# Patient Record
Sex: FEMALE | Race: WHITE | Employment: UNEMPLOYED | URBAN - NONMETROPOLITAN AREA
[De-identification: names, ages, dates, MRNs, and addresses within clinical notes are randomized per-mention and may not be internally consistent; named-entity substitution may affect disease eponyms.]

---

## 2020-06-21 ENCOUNTER — HOSPITAL ENCOUNTER (EMERGENCY)
Age: 65
Discharge: ANOTHER HOSPITAL | End: 2020-06-22
Attending: EMERGENCY MEDICINE
Payer: MEDICAID

## 2020-06-21 LAB
ABSOLUTE EOS #: 0.14 K/UL (ref 0–0.44)
ABSOLUTE IMMATURE GRANULOCYTE: 0.04 K/UL (ref 0–0.3)
ABSOLUTE LYMPH #: 1.7 K/UL (ref 1.1–3.7)
ABSOLUTE MONO #: 0.38 K/UL (ref 0.1–1.2)
BASOPHILS # BLD: 0 % (ref 0–2)
BASOPHILS ABSOLUTE: 0.03 K/UL (ref 0–0.2)
DIFFERENTIAL TYPE: ABNORMAL
EOSINOPHILS RELATIVE PERCENT: 2 % (ref 1–4)
HCT VFR BLD CALC: 41.6 % (ref 36.3–47.1)
HEMOGLOBIN: 13.5 G/DL (ref 11.9–15.1)
IMMATURE GRANULOCYTES: 1 %
LYMPHOCYTES # BLD: 20 % (ref 24–43)
MCH RBC QN AUTO: 29.4 PG (ref 25.2–33.5)
MCHC RBC AUTO-ENTMCNC: 32.5 G/DL (ref 28.4–34.8)
MCV RBC AUTO: 90.6 FL (ref 82.6–102.9)
MONOCYTES # BLD: 5 % (ref 3–12)
NRBC AUTOMATED: 0 PER 100 WBC
PDW BLD-RTO: 13.2 % (ref 11.8–14.4)
PLATELET # BLD: 201 K/UL (ref 138–453)
PLATELET ESTIMATE: ABNORMAL
PMV BLD AUTO: 11 FL (ref 8.1–13.5)
RBC # BLD: 4.59 M/UL (ref 3.95–5.11)
RBC # BLD: ABNORMAL 10*6/UL
SEG NEUTROPHILS: 72 % (ref 36–65)
SEGMENTED NEUTROPHILS ABSOLUTE COUNT: 6.14 K/UL (ref 1.5–8.1)
WBC # BLD: 8.4 K/UL (ref 3.5–11.3)
WBC # BLD: ABNORMAL 10*3/UL

## 2020-06-21 PROCEDURE — 83690 ASSAY OF LIPASE: CPT

## 2020-06-21 PROCEDURE — 36415 COLL VENOUS BLD VENIPUNCTURE: CPT

## 2020-06-21 PROCEDURE — 80076 HEPATIC FUNCTION PANEL: CPT

## 2020-06-21 PROCEDURE — 85025 COMPLETE CBC W/AUTO DIFF WBC: CPT

## 2020-06-21 PROCEDURE — 80306 DRUG TEST PRSMV INSTRMNT: CPT

## 2020-06-21 PROCEDURE — 83605 ASSAY OF LACTIC ACID: CPT

## 2020-06-21 PROCEDURE — 87086 URINE CULTURE/COLONY COUNT: CPT

## 2020-06-21 PROCEDURE — 84484 ASSAY OF TROPONIN QUANT: CPT

## 2020-06-21 PROCEDURE — G0480 DRUG TEST DEF 1-7 CLASSES: HCPCS

## 2020-06-21 PROCEDURE — 85610 PROTHROMBIN TIME: CPT

## 2020-06-21 PROCEDURE — 99285 EMERGENCY DEPT VISIT HI MDM: CPT

## 2020-06-21 PROCEDURE — 85730 THROMBOPLASTIN TIME PARTIAL: CPT

## 2020-06-21 PROCEDURE — 80048 BASIC METABOLIC PNL TOTAL CA: CPT

## 2020-06-21 PROCEDURE — 83880 ASSAY OF NATRIURETIC PEPTIDE: CPT

## 2020-06-21 PROCEDURE — 93005 ELECTROCARDIOGRAM TRACING: CPT | Performed by: EMERGENCY MEDICINE

## 2020-06-21 RX ORDER — 0.9 % SODIUM CHLORIDE 0.9 %
1000 INTRAVENOUS SOLUTION INTRAVENOUS ONCE
Status: COMPLETED | OUTPATIENT
Start: 2020-06-22 | End: 2020-06-22

## 2020-06-21 RX ORDER — ONDANSETRON 2 MG/ML
4 INJECTION INTRAMUSCULAR; INTRAVENOUS ONCE
Status: COMPLETED | OUTPATIENT
Start: 2020-06-22 | End: 2020-06-22

## 2020-06-21 RX ORDER — METHYLPREDNISOLONE SODIUM SUCCINATE 125 MG/2ML
125 INJECTION, POWDER, LYOPHILIZED, FOR SOLUTION INTRAMUSCULAR; INTRAVENOUS ONCE
Status: COMPLETED | OUTPATIENT
Start: 2020-06-22 | End: 2020-06-22

## 2020-06-21 RX ORDER — DIPHENHYDRAMINE HYDROCHLORIDE 50 MG/ML
50 INJECTION INTRAMUSCULAR; INTRAVENOUS ONCE
Status: COMPLETED | OUTPATIENT
Start: 2020-06-22 | End: 2020-06-22

## 2020-06-22 ENCOUNTER — APPOINTMENT (OUTPATIENT)
Dept: MRI IMAGING | Age: 65
DRG: 053 | End: 2020-06-22
Attending: FAMILY MEDICINE
Payer: MEDICAID

## 2020-06-22 ENCOUNTER — APPOINTMENT (OUTPATIENT)
Dept: GENERAL RADIOLOGY | Age: 65
DRG: 053 | End: 2020-06-22
Attending: FAMILY MEDICINE
Payer: MEDICAID

## 2020-06-22 ENCOUNTER — APPOINTMENT (OUTPATIENT)
Dept: CT IMAGING | Age: 65
End: 2020-06-22
Payer: MEDICAID

## 2020-06-22 ENCOUNTER — HOSPITAL ENCOUNTER (INPATIENT)
Age: 65
LOS: 1 days | Discharge: HOME | DRG: 053 | End: 2020-06-23
Attending: FAMILY MEDICINE | Admitting: INTERNAL MEDICINE
Payer: MEDICAID

## 2020-06-22 VITALS
OXYGEN SATURATION: 93 % | TEMPERATURE: 96.6 F | DIASTOLIC BLOOD PRESSURE: 78 MMHG | HEART RATE: 89 BPM | SYSTOLIC BLOOD PRESSURE: 158 MMHG | RESPIRATION RATE: 16 BRPM

## 2020-06-22 PROBLEM — R56.9 SEIZURE (HCC): Status: ACTIVE | Noted: 2020-06-22

## 2020-06-22 LAB
-: ABNORMAL
ALBUMIN SERPL-MCNC: 4.4 G/DL (ref 3.5–5.2)
ALBUMIN/GLOBULIN RATIO: 1.5 (ref 1–2.5)
ALP BLD-CCNC: 91 U/L (ref 35–104)
ALT SERPL-CCNC: 17 U/L (ref 5–33)
AMORPHOUS: ABNORMAL
AMPHETAMINE SCREEN URINE: NEGATIVE
ANION GAP SERPL CALCULATED.3IONS-SCNC: 14 MMOL/L (ref 9–17)
ANION GAP SERPL CALCULATED.3IONS-SCNC: 15 MEQ/L (ref 8–16)
AST SERPL-CCNC: 22 U/L
BACTERIA: ABNORMAL
BARBITURATE SCREEN URINE: NEGATIVE
BENZODIAZEPINE SCREEN, URINE: NEGATIVE
BILIRUB SERPL-MCNC: 0.2 MG/DL (ref 0.3–1.2)
BILIRUBIN DIRECT: <0.08 MG/DL
BILIRUBIN URINE: NEGATIVE
BILIRUBIN, INDIRECT: ABNORMAL MG/DL (ref 0–1)
BNP INTERPRETATION: NORMAL
BUN BLDV-MCNC: 17 MG/DL (ref 7–22)
BUN BLDV-MCNC: 20 MG/DL (ref 8–23)
BUN/CREAT BLD: 19 (ref 9–20)
BUPRENORPHINE URINE: NEGATIVE
CALCIUM SERPL-MCNC: 8.7 MG/DL (ref 8.6–10.4)
CALCIUM SERPL-MCNC: 9.4 MG/DL (ref 8.5–10.5)
CANNABINOID SCREEN URINE: NEGATIVE
CASTS UA: ABNORMAL /LPF
CHLORIDE BLD-SCNC: 97 MEQ/L (ref 98–111)
CHLORIDE BLD-SCNC: 98 MMOL/L (ref 98–107)
CO2: 22 MEQ/L (ref 23–33)
CO2: 25 MMOL/L (ref 20–31)
COCAINE METABOLITE, URINE: NEGATIVE
COLOR: YELLOW
COMMENT UA: ABNORMAL
CREAT SERPL-MCNC: 0.9 MG/DL (ref 0.4–1.2)
CREAT SERPL-MCNC: 1.04 MG/DL (ref 0.5–0.9)
CRYSTALS, UA: ABNORMAL /HPF
EKG ATRIAL RATE: 73 BPM
EKG P AXIS: 35 DEGREES
EKG P-R INTERVAL: 204 MS
EKG Q-T INTERVAL: 420 MS
EKG QRS DURATION: 88 MS
EKG QTC CALCULATION (BAZETT): 462 MS
EKG R AXIS: 16 DEGREES
EKG T AXIS: 12 DEGREES
EKG VENTRICULAR RATE: 73 BPM
EPITHELIAL CELLS UA: ABNORMAL /HPF (ref 0–25)
ERYTHROCYTE [DISTWIDTH] IN BLOOD BY AUTOMATED COUNT: 15.6 % (ref 11.5–14.5)
ERYTHROCYTE [DISTWIDTH] IN BLOOD BY AUTOMATED COUNT: 45.1 FL (ref 35–45)
ETHANOL PERCENT: <0.01 %
ETHANOL: <10 MG/DL
GFR AFRICAN AMERICAN: >60 ML/MIN
GFR NON-AFRICAN AMERICAN: 54 ML/MIN
GFR SERPL CREATININE-BSD FRML MDRD: 63 ML/MIN/1.73M2
GFR SERPL CREATININE-BSD FRML MDRD: ABNORMAL ML/MIN/{1.73_M2}
GFR SERPL CREATININE-BSD FRML MDRD: ABNORMAL ML/MIN/{1.73_M2}
GLOBULIN: ABNORMAL G/DL (ref 1.5–3.8)
GLUCOSE BLD-MCNC: 163 MG/DL (ref 70–108)
GLUCOSE BLD-MCNC: 178 MG/DL (ref 70–99)
GLUCOSE URINE: NEGATIVE
HCT VFR BLD CALC: 37.9 % (ref 37–47)
HEMOGLOBIN: 13.8 GM/DL (ref 12–16)
INR BLD: 1
KETONES, URINE: ABNORMAL
LACTIC ACID: 2.6 MMOL/L (ref 0.5–2.2)
LACTIC ACID: 3.1 MMOL/L (ref 0.5–2.2)
LACTIC ACID: 3.3 MMOL/L (ref 0.5–2.2)
LEUKOCYTE ESTERASE, URINE: NEGATIVE
LIPASE: 36 U/L (ref 13–60)
MCH RBC QN AUTO: 35.3 PG (ref 26–33)
MCHC RBC AUTO-ENTMCNC: 36.4 GM/DL (ref 32.2–35.5)
MCV RBC AUTO: 96.9 FL (ref 81–99)
MDMA URINE: ABNORMAL
METHADONE SCREEN, URINE: NEGATIVE
METHAMPHETAMINE, URINE: NEGATIVE
MUCUS: ABNORMAL
NITRITE, URINE: POSITIVE
OPIATES, URINE: POSITIVE
OTHER OBSERVATIONS UA: ABNORMAL
OXYCODONE SCREEN URINE: NEGATIVE
PARTIAL THROMBOPLASTIN TIME: 28 SEC (ref 24–36)
PH UA: 6 (ref 5–9)
PHENCYCLIDINE, URINE: NEGATIVE
PLATELET # BLD: 200 THOU/MM3 (ref 130–400)
PMV BLD AUTO: 11.1 FL (ref 9.4–12.4)
POTASSIUM SERPL-SCNC: 4 MMOL/L (ref 3.7–5.3)
POTASSIUM SERPL-SCNC: 4.2 MEQ/L (ref 3.5–5.2)
PRO-BNP: 285 PG/ML
PROPOXYPHENE, URINE: NEGATIVE
PROTEIN UA: NEGATIVE
PROTHROMBIN TIME: 12.9 SEC (ref 11.8–14.6)
RBC # BLD: 3.91 MILL/MM3 (ref 4.2–5.4)
RBC UA: ABNORMAL /HPF (ref 0–2)
RENAL EPITHELIAL, UA: ABNORMAL /HPF
SODIUM BLD-SCNC: 134 MEQ/L (ref 135–145)
SODIUM BLD-SCNC: 137 MMOL/L (ref 135–144)
SPECIFIC GRAVITY UA: 1.02 (ref 1.01–1.02)
TEST INFORMATION: ABNORMAL
TOTAL CK: 109 U/L (ref 30–135)
TOTAL PROTEIN: 7.4 G/DL (ref 6.4–8.3)
TRICHOMONAS: ABNORMAL
TRICYCLIC ANTIDEPRESSANTS, UR: NEGATIVE
TROPONIN INTERP: NORMAL
TROPONIN T: NORMAL NG/ML
TROPONIN, HIGH SENSITIVITY: 9 NG/L (ref 0–14)
TURBIDITY: CLEAR
URINE HGB: ABNORMAL
UROBILINOGEN, URINE: NORMAL
WBC # BLD: 7.8 THOU/MM3 (ref 4.8–10.8)
WBC UA: ABNORMAL /HPF (ref 0–5)
YEAST: ABNORMAL

## 2020-06-22 PROCEDURE — 87088 URINE BACTERIA CULTURE: CPT

## 2020-06-22 PROCEDURE — 6360000004 HC RX CONTRAST MEDICATION: Performed by: EMERGENCY MEDICINE

## 2020-06-22 PROCEDURE — 2580000003 HC RX 258: Performed by: NURSE PRACTITIONER

## 2020-06-22 PROCEDURE — 72125 CT NECK SPINE W/O DYE: CPT

## 2020-06-22 PROCEDURE — 72072 X-RAY EXAM THORAC SPINE 3VWS: CPT

## 2020-06-22 PROCEDURE — 82550 ASSAY OF CK (CPK): CPT

## 2020-06-22 PROCEDURE — 6370000000 HC RX 637 (ALT 250 FOR IP): Performed by: NURSE PRACTITIONER

## 2020-06-22 PROCEDURE — 2060000000 HC ICU INTERMEDIATE R&B

## 2020-06-22 PROCEDURE — 73610 X-RAY EXAM OF ANKLE: CPT

## 2020-06-22 PROCEDURE — G0378 HOSPITAL OBSERVATION PER HR: HCPCS

## 2020-06-22 PROCEDURE — 6370000000 HC RX 637 (ALT 250 FOR IP): Performed by: INTERNAL MEDICINE

## 2020-06-22 PROCEDURE — 72100 X-RAY EXAM L-S SPINE 2/3 VWS: CPT

## 2020-06-22 PROCEDURE — 74177 CT ABD & PELVIS W/CONTRAST: CPT

## 2020-06-22 PROCEDURE — 2580000003 HC RX 258: Performed by: EMERGENCY MEDICINE

## 2020-06-22 PROCEDURE — 6360000002 HC RX W HCPCS: Performed by: EMERGENCY MEDICINE

## 2020-06-22 PROCEDURE — 36415 COLL VENOUS BLD VENIPUNCTURE: CPT

## 2020-06-22 PROCEDURE — 96375 TX/PRO/DX INJ NEW DRUG ADDON: CPT

## 2020-06-22 PROCEDURE — G0379 DIRECT REFER HOSPITAL OBSERV: HCPCS

## 2020-06-22 PROCEDURE — 70450 CT HEAD/BRAIN W/O DYE: CPT

## 2020-06-22 PROCEDURE — 96365 THER/PROPH/DIAG IV INF INIT: CPT

## 2020-06-22 PROCEDURE — 93010 ELECTROCARDIOGRAM REPORT: CPT | Performed by: FAMILY MEDICINE

## 2020-06-22 PROCEDURE — 87186 SC STD MICRODIL/AGAR DIL: CPT

## 2020-06-22 PROCEDURE — 99999 PR OFFICE/OUTPT VISIT,PROCEDURE ONLY: CPT | Performed by: INTERNAL MEDICINE

## 2020-06-22 PROCEDURE — 80048 BASIC METABOLIC PNL TOTAL CA: CPT

## 2020-06-22 PROCEDURE — 70551 MRI BRAIN STEM W/O DYE: CPT

## 2020-06-22 PROCEDURE — C9113 INJ PANTOPRAZOLE SODIUM, VIA: HCPCS | Performed by: EMERGENCY MEDICINE

## 2020-06-22 PROCEDURE — 99222 1ST HOSP IP/OBS MODERATE 55: CPT | Performed by: NURSE PRACTITIONER

## 2020-06-22 PROCEDURE — 99223 1ST HOSP IP/OBS HIGH 75: CPT | Performed by: PSYCHIATRY & NEUROLOGY

## 2020-06-22 PROCEDURE — 96361 HYDRATE IV INFUSION ADD-ON: CPT

## 2020-06-22 PROCEDURE — 83605 ASSAY OF LACTIC ACID: CPT

## 2020-06-22 PROCEDURE — 71260 CT THORAX DX C+: CPT

## 2020-06-22 PROCEDURE — 81001 URINALYSIS AUTO W/SCOPE: CPT

## 2020-06-22 PROCEDURE — 85027 COMPLETE CBC AUTOMATED: CPT

## 2020-06-22 RX ORDER — SODIUM CHLORIDE 0.9 % (FLUSH) 0.9 %
10 SYRINGE (ML) INJECTION EVERY 12 HOURS SCHEDULED
Status: DISCONTINUED | OUTPATIENT
Start: 2020-06-22 | End: 2020-06-23 | Stop reason: HOSPADM

## 2020-06-22 RX ORDER — ACETAMINOPHEN 650 MG/1
650 SUPPOSITORY RECTAL EVERY 6 HOURS PRN
Status: DISCONTINUED | OUTPATIENT
Start: 2020-06-22 | End: 2020-06-23 | Stop reason: HOSPADM

## 2020-06-22 RX ORDER — PANTOPRAZOLE SODIUM 40 MG/10ML
INJECTION, POWDER, LYOPHILIZED, FOR SOLUTION INTRAVENOUS
Status: DISCONTINUED
Start: 2020-06-22 | End: 2020-06-22 | Stop reason: HOSPADM

## 2020-06-22 RX ORDER — PROMETHAZINE HYDROCHLORIDE 25 MG/1
12.5 TABLET ORAL EVERY 6 HOURS PRN
Status: DISCONTINUED | OUTPATIENT
Start: 2020-06-22 | End: 2020-06-23 | Stop reason: HOSPADM

## 2020-06-22 RX ORDER — POTASSIUM CHLORIDE 20 MEQ/1
40 TABLET, EXTENDED RELEASE ORAL PRN
Status: DISCONTINUED | OUTPATIENT
Start: 2020-06-22 | End: 2020-06-23 | Stop reason: HOSPADM

## 2020-06-22 RX ORDER — SODIUM CHLORIDE 0.9 % (FLUSH) 0.9 %
10 SYRINGE (ML) INJECTION PRN
Status: DISCONTINUED | OUTPATIENT
Start: 2020-06-22 | End: 2020-06-23 | Stop reason: HOSPADM

## 2020-06-22 RX ORDER — PANTOPRAZOLE SODIUM 40 MG/1
40 TABLET, DELAYED RELEASE ORAL
Status: DISCONTINUED | OUTPATIENT
Start: 2020-06-22 | End: 2020-06-22

## 2020-06-22 RX ORDER — ONDANSETRON 2 MG/ML
4 INJECTION INTRAMUSCULAR; INTRAVENOUS EVERY 6 HOURS PRN
Status: DISCONTINUED | OUTPATIENT
Start: 2020-06-22 | End: 2020-06-23 | Stop reason: HOSPADM

## 2020-06-22 RX ORDER — ACETAMINOPHEN 325 MG/1
650 TABLET ORAL EVERY 6 HOURS PRN
Status: DISCONTINUED | OUTPATIENT
Start: 2020-06-22 | End: 2020-06-23 | Stop reason: HOSPADM

## 2020-06-22 RX ORDER — POTASSIUM CHLORIDE 7.45 MG/ML
10 INJECTION INTRAVENOUS PRN
Status: DISCONTINUED | OUTPATIENT
Start: 2020-06-22 | End: 2020-06-23 | Stop reason: HOSPADM

## 2020-06-22 RX ORDER — BUTALBITAL, ACETAMINOPHEN AND CAFFEINE 50; 325; 40 MG/1; MG/1; MG/1
1 TABLET ORAL EVERY 4 HOURS PRN
Status: DISCONTINUED | OUTPATIENT
Start: 2020-06-22 | End: 2020-06-23 | Stop reason: HOSPADM

## 2020-06-22 RX ORDER — PANTOPRAZOLE SODIUM 40 MG/1
40 TABLET, DELAYED RELEASE ORAL
Status: DISCONTINUED | OUTPATIENT
Start: 2020-06-22 | End: 2020-06-23 | Stop reason: HOSPADM

## 2020-06-22 RX ORDER — NICOTINE 21 MG/24HR
1 PATCH, TRANSDERMAL 24 HOURS TRANSDERMAL DAILY
Status: DISCONTINUED | OUTPATIENT
Start: 2020-06-22 | End: 2020-06-23 | Stop reason: HOSPADM

## 2020-06-22 RX ORDER — SODIUM CHLORIDE 9 MG/ML
INJECTION, SOLUTION INTRAVENOUS
Status: DISCONTINUED
Start: 2020-06-22 | End: 2020-06-22 | Stop reason: HOSPADM

## 2020-06-22 RX ORDER — SODIUM CHLORIDE 9 MG/ML
INJECTION, SOLUTION INTRAVENOUS CONTINUOUS
Status: DISCONTINUED | OUTPATIENT
Start: 2020-06-22 | End: 2020-06-23

## 2020-06-22 RX ADMIN — SODIUM CHLORIDE 8 MG/HR: 9 INJECTION, SOLUTION INTRAVENOUS at 01:37

## 2020-06-22 RX ADMIN — Medication 10 ML: at 20:59

## 2020-06-22 RX ADMIN — ACETAMINOPHEN 650 MG: 325 TABLET ORAL at 14:05

## 2020-06-22 RX ADMIN — SODIUM CHLORIDE: 9 INJECTION, SOLUTION INTRAVENOUS at 11:23

## 2020-06-22 RX ADMIN — ONDANSETRON 4 MG: 2 INJECTION INTRAMUSCULAR; INTRAVENOUS at 00:06

## 2020-06-22 RX ADMIN — SODIUM CHLORIDE 1000 ML: 9 INJECTION, SOLUTION INTRAVENOUS at 00:04

## 2020-06-22 RX ADMIN — DIPHENHYDRAMINE HYDROCHLORIDE 50 MG: 50 INJECTION, SOLUTION INTRAMUSCULAR; INTRAVENOUS at 00:06

## 2020-06-22 RX ADMIN — METOPROLOL TARTRATE 25 MG: 25 TABLET ORAL at 20:58

## 2020-06-22 RX ADMIN — PANTOPRAZOLE SODIUM 40 MG: 40 TABLET, DELAYED RELEASE ORAL at 08:34

## 2020-06-22 RX ADMIN — SODIUM CHLORIDE 80 MG: 9 INJECTION, SOLUTION INTRAVENOUS at 00:29

## 2020-06-22 RX ADMIN — IOPAMIDOL 75 ML: 755 INJECTION, SOLUTION INTRAVENOUS at 01:01

## 2020-06-22 RX ADMIN — BISACODYL 5 MG: 5 TABLET, COATED ORAL at 20:58

## 2020-06-22 RX ADMIN — BUTALBITAL, ACETAMINOPHEN, AND CAFFEINE 1 TABLET: 50; 325; 40 TABLET ORAL at 21:07

## 2020-06-22 RX ADMIN — METHYLPREDNISOLONE SODIUM SUCCINATE 125 MG: 125 INJECTION, POWDER, FOR SOLUTION INTRAMUSCULAR; INTRAVENOUS at 00:06

## 2020-06-22 RX ADMIN — Medication 10 ML: at 08:38

## 2020-06-22 RX ADMIN — ACETAMINOPHEN 650 MG: 325 TABLET ORAL at 08:19

## 2020-06-22 RX ADMIN — METOPROLOL TARTRATE 25 MG: 25 TABLET ORAL at 08:34

## 2020-06-22 ASSESSMENT — PAIN DESCRIPTION - DESCRIPTORS
DESCRIPTORS: SHOOTING
DESCRIPTORS: CONSTANT;DULL
DESCRIPTORS: HEADACHE

## 2020-06-22 ASSESSMENT — ENCOUNTER SYMPTOMS
COLOR CHANGE: 0
VOMITING: 1
WHEEZING: 0
BLOOD IN STOOL: 0
SHORTNESS OF BREATH: 0
ABDOMINAL PAIN: 0
NAUSEA: 1

## 2020-06-22 ASSESSMENT — PAIN DESCRIPTION - ONSET
ONSET: ON-GOING
ONSET: GRADUAL
ONSET: ON-GOING

## 2020-06-22 ASSESSMENT — PAIN DESCRIPTION - FREQUENCY
FREQUENCY: INTERMITTENT
FREQUENCY: INTERMITTENT
FREQUENCY: CONTINUOUS

## 2020-06-22 ASSESSMENT — PAIN SCALES - GENERAL
PAINLEVEL_OUTOF10: 7
PAINLEVEL_OUTOF10: 8
PAINLEVEL_OUTOF10: 3
PAINLEVEL_OUTOF10: 7
PAINLEVEL_OUTOF10: 0
PAINLEVEL_OUTOF10: 0
PAINLEVEL_OUTOF10: 10
PAINLEVEL_OUTOF10: 8
PAINLEVEL_OUTOF10: 8

## 2020-06-22 ASSESSMENT — PAIN DESCRIPTION - PROGRESSION
CLINICAL_PROGRESSION: GRADUALLY WORSENING
CLINICAL_PROGRESSION: RESOLVED

## 2020-06-22 ASSESSMENT — PAIN DESCRIPTION - ORIENTATION
ORIENTATION: LEFT;RIGHT
ORIENTATION: ANTERIOR
ORIENTATION: ANTERIOR

## 2020-06-22 ASSESSMENT — PAIN DESCRIPTION - PAIN TYPE
TYPE: ACUTE PAIN

## 2020-06-22 ASSESSMENT — PAIN - FUNCTIONAL ASSESSMENT
PAIN_FUNCTIONAL_ASSESSMENT: PREVENTS OR INTERFERES SOME ACTIVE ACTIVITIES AND ADLS
PAIN_FUNCTIONAL_ASSESSMENT: ACTIVITIES ARE NOT PREVENTED
PAIN_FUNCTIONAL_ASSESSMENT: PREVENTS OR INTERFERES SOME ACTIVE ACTIVITIES AND ADLS

## 2020-06-22 ASSESSMENT — PAIN DESCRIPTION - LOCATION
LOCATION: HEAD
LOCATION: HEAD
LOCATION: CHEST

## 2020-06-22 NOTE — ED NOTES
Patient assisted to the bathroom and then onto ambulance stretcher. UA sample collected at this time.        Shandra Valdez RN  06/22/20 [id number]

## 2020-06-22 NOTE — ED PROVIDER NOTES
and Pneumonia. has a past surgical history that includes Eye surgery (Right); Ankle surgery (Left); Colonoscopy; Appendectomy; eye surgery; fracture surgery; Ankle surgery; and Breast surgery (2016). Social History     Socioeconomic History    Marital status: Single     Spouse name: Not on file    Number of children: Not on file    Years of education: Not on file    Highest education level: Not on file   Occupational History    Not on file   Social Needs    Financial resource strain: Not on file    Food insecurity     Worry: Not on file     Inability: Not on file    Transportation needs     Medical: Not on file     Non-medical: Not on file   Tobacco Use    Smoking status: Current Every Day Smoker     Packs/day: 1.00    Smokeless tobacco: Never Used   Substance and Sexual Activity    Alcohol use: No    Drug use: No    Sexual activity: Not on file   Lifestyle    Physical activity     Days per week: Not on file     Minutes per session: Not on file    Stress: Not on file   Relationships    Social connections     Talks on phone: Not on file     Gets together: Not on file     Attends Restoration service: Not on file     Active member of club or organization: Not on file     Attends meetings of clubs or organizations: Not on file     Relationship status: Not on file    Intimate partner violence     Fear of current or ex partner: Not on file     Emotionally abused: Not on file     Physically abused: Not on file     Forced sexual activity: Not on file   Other Topics Concern    Not on file   Social History Narrative    ** Merged History Encounter **            Family History   Problem Relation Age of Onset    Cancer Father 70        stomach     Heart Disease Mother     Diabetes Mother     Other Mother         Heart Attack       Allergies:  Shellfish-derived products;  Iodine; and Shellfish-derived products    Home Medications:  Prior to Admission medications    Medication Sig Start Date End Date Taking? Authorizing Provider   ibuprofen (ADVIL;MOTRIN) 600 MG tablet Take 1 tablet by mouth 3 times daily (with meals) 8/3/15  Yes Benjamin Kong MD   metoprolol (LOPRESSOR) 25 MG tablet TAKE 1 TABLET BY MOUTH TWICE DAILY. 4/21/16   Benjamin Kong MD   HYDROcodone-acetaminophen (NORCO) 5-325 MG per tablet Take 1 tablet by mouth every 6 hours as needed for Pain    Historical Provider, MD   docusate sodium (COLACE) 100 MG capsule Take 100 mg by mouth 2 times daily    Historical Provider, MD   pantoprazole (PROTONIX) 40 MG tablet Take 1 tablet by mouth every morning (before breakfast) 8/5/15   Stacey Barrett MD   HYDROcodone-acetaminophen (NORCO) 5-325 MG per tablet Take 1 tablet by mouth every 6 hours as needed for Pain. 3/19/13   Marysol Solis, JOSÉ LUIS - CNP       REVIEW OF SYSTEMS    (2-9 systems for level 4, 10 or more for level 5)      Review of Systems   Constitutional: Negative for chills and fever. HENT: Negative for congestion. Respiratory: Negative for shortness of breath and wheezing. Cardiovascular: Positive for chest pain. Negative for leg swelling. Gastrointestinal: Positive for nausea and vomiting. Negative for abdominal pain and blood in stool. Genitourinary: Negative for dysuria. Skin: Negative for color change. Neurological: Positive for seizures and syncope. Negative for facial asymmetry, speech difficulty, weakness, numbness and headaches. Psychiatric/Behavioral: Negative for agitation. PHYSICAL EXAM   (up to 7 for level 4, 8 or more for level 5)      INITIAL VITALS:   BP (!) 158/78   Pulse 89   Temp 96.6 °F (35.9 °C) (Tympanic)   Resp 16   LMP  (LMP Unknown)   SpO2 93%     Physical Exam  Constitutional:       General: She is in acute distress. Appearance: She is well-developed. Comments: Uncomfortable appearing, vomiting multiple times   HENT:      Head: Normocephalic and atraumatic. Eyes:      General:         Right eye: No discharge.          Left injection 75 mL    pantoprazole (PROTONIX) 40 MG injection     LaLone, Siomara: cabinet override    sodium chloride 0.9 % infusion     LaLone, Siomara: cabinet override    sodium chloride 0.9 % infusion     LaLone, Siomara: cabinet override       DDX: Syncope versus seizure versus aspiration versus PE versus dissection versus GI bleeding    DIAGNOSTIC RESULTS / EMERGENCY DEPARTMENT COURSE / MDM   :  Results for orders placed or performed during the hospital encounter of 06/21/20   Ethanol   Result Value Ref Range    Ethanol <10 <10 mg/dL    Ethanol percent <0.010 <0.010 %   CBC Auto Differential   Result Value Ref Range    WBC 8.4 3.5 - 11.3 k/uL    RBC 4.59 3.95 - 5.11 m/uL    Hemoglobin 13.5 11.9 - 15.1 g/dL    Hematocrit 41.6 36.3 - 47.1 %    MCV 90.6 82.6 - 102.9 fL    MCH 29.4 25.2 - 33.5 pg    MCHC 32.5 28.4 - 34.8 g/dL    RDW 13.2 11.8 - 14.4 %    Platelets 282 924 - 773 k/uL    MPV 11.0 8.1 - 13.5 fL    NRBC Automated 0.0 0.0 per 100 WBC    Differential Type NOT REPORTED     Seg Neutrophils 72 (H) 36 - 65 %    Lymphocytes 20 (L) 24 - 43 %    Monocytes 5 3 - 12 %    Eosinophils % 2 1 - 4 %    Basophils 0 0 - 2 %    Immature Granulocytes 1 (H) 0 %    Segs Absolute 6.14 1.50 - 8.10 k/uL    Absolute Lymph # 1.70 1.10 - 3.70 k/uL    Absolute Mono # 0.38 0.10 - 1.20 k/uL    Absolute Eos # 0.14 0.00 - 0.44 k/uL    Basophils Absolute 0.03 0.00 - 0.20 k/uL    Absolute Immature Granulocyte 0.04 0.00 - 0.30 k/uL    WBC Morphology NOT REPORTED     RBC Morphology NOT REPORTED     Platelet Estimate NOT REPORTED    Basic Metabolic Panel w/ Reflex to MG   Result Value Ref Range    Glucose 178 (H) 70 - 99 mg/dL    BUN 20 8 - 23 mg/dL    CREATININE 1.04 (H) 0.50 - 0.90 mg/dL    Bun/Cre Ratio 19 9 - 20    Calcium 8.7 8.6 - 10.4 mg/dL    Sodium 137 135 - 144 mmol/L    Potassium 4.0 3.7 - 5.3 mmol/L    Chloride 98 98 - 107 mmol/L    CO2 25 20 - 31 mmol/L    Anion Gap 14 9 - 17 mmol/L    GFR Non-African American 54 (L) >60 mL/min Screen, Ur NEGATIVE NEGATIVE    Methamphetamine, Urine NEGATIVE NEGATIVE    Tricyclic Antidepressants, Urine NEGATIVE NEGATIVE    MDMA, Urine NOT REPORTED NEGATIVE    Buprenorphine Urine NEGATIVE NEGATIVE    Test Information NOT REPORTED    Lactic Acid   Result Value Ref Range    Lactic Acid 2.6 (H) 0.5 - 2.2 mmol/L   Microscopic Urinalysis   Result Value Ref Range    -          WBC, UA 0 TO 2 0 - 5 /HPF    RBC, UA 0 TO 2 0 - 2 /HPF    Casts UA NOT REPORTED /LPF    Crystals, UA NOT REPORTED None /HPF    Epithelial Cells UA 0 TO 2 0 - 25 /HPF    Renal Epithelial, UA NOT REPORTED 0 /HPF    Bacteria, UA 3+ (A) None    Mucus, UA TRACE (A) None    Trichomonas, UA NOT REPORTED None    Amorphous, UA NOT REPORTED None    Other Observations UA NOT REPORTED NOT REQ. Yeast, UA NOT REPORTED None   EKG 12 Lead   Result Value Ref Range    Ventricular Rate 73 BPM    Atrial Rate 73 BPM    P-R Interval 204 ms    QRS Duration 88 ms    Q-T Interval 420 ms    QTc Calculation (Bazett) 462 ms    P Axis 35 degrees    R Axis 16 degrees    T Axis 12 degrees       IMPRESSION: 53-year-old female comes into the emergency department after being unresponsive for about 25 minutes. Initially the EMS history did not talk about any seizure-like activity and they only told me about the patient being unresponsive and cyanotic. Given this history we did a CT chest to make sure there is no pulmonary embolus. However, after talking to the brother it appears that the patient had a 7-minute episode of a seizure followed by a prolonged postictal.  Patient was  in respiratory depression and required rescue breaths from the brother of the patient. Patient was also found to have cyanosis and desaturations on arrival by EMS as well. Given this, CT head was done given the first time seizure episode.,  No acute abnormalities. Patient was also having multiple episodes of emesis, her emesis was also Hemoccult positive.   Started on Protonix, imaging did not adjustment of the mA/kV was utilized to reduce the radiation dose to as low as reasonably achievable. COMPARISON: None HISTORY: Patient had a syncopal episode and was unresponsive for 10 minutes. Acute chest pain with hypoxia. Multiple episodes of vomiting. Upper GI bleeding. FINDINGS: Chest: Pulmonary Arteries: Pulmonary arteries are adequately opacified for evaluation. No intraluminal filling defect to suggest pulmonary embolism. Main pulmonary artery is normal in caliber. Mediastinum: Heart is not enlarged but there is mild left ventricular hypertrophy. No pericardial effusion. Thoracic aorta is normal caliber. No lymphadenopathy. Scattered calcified lymph nodes. Esophagus is unremarkable. Lungs/pleura: Lungs are clear. No pleural effusion or pneumothorax. Soft Tissues/Bones: Unremarkable. Abdomen/Pelvis: Organs: Cholelithiasis. Calcified splenic granulomas. Liver, pancreas, adrenals, are unremarkable. Small bilateral renal cysts measuring up to 1.2 cm. No hydronephrosis. GI/Bowel: Stomach and small bowel are unremarkable. The appendix is surgically absent. Diverticulosis throughout the colon without acute inflammatory changes. Pelvis: Bladder and uterus are unremarkable. No lymphadenopathy or free fluid. Peritoneum/Retroperitoneum: No lymphadenopathy or ascites. Mild to moderate atherosclerotic disease without abdominal aortic aneurysm. Bones/Soft Tissues: Degenerative disc disease at L5-S1.     1. No pulmonary embolism or acute pulmonary abnormality. 2. Cholelithiasis. 3. Colonic diverticulosis without acute diverticulitis. Ct Chest Pulmonary Embolism W Contrast    Result Date: 6/22/2020  EXAMINATION: CTA OF THE CHEST; CT OF THE ABDOMEN AND PELVIS WITH CONTRAST 6/22/2020 12:31 am TECHNIQUE: CTA of the chest and CT of the abdomen and pelvis was performed with the administration of 75 mL Isovue 370 intravenous contrast. Multiplanar reformatted images are provided for review.  Dose modulation, Physician who either signs or Co-signs this chart in the absence of a cardiologist.    EMERGENCY DEPARTMENT COURSE:    Discussed with neurology, they recommend transfer for further evaluation given the first-time seizure episode. Discussed with the Transfer line, patient excepted for transfer to [facility].    PROCEDURES:  None    CONSULTS:  None    CRITICAL CARE:  None    FINAL IMPRESSION      1. Seizure ([address]    2. Lactic acidosis          DISPOSITION / PLAN     DISPOSITION Decision To Transfer 06/22/2020 03:21:08 AM      PATIENT REFERRED TO:  No follow-up provider specified.     DISCHARGE MEDICATIONS:  Discharge Medication List as of 6/22/2020  4:44 AM          Laila Chi MD  Emergency Medicine Attending    (Please note that portions of thisnote were completed with a voice recognition program.  Efforts were made to edit the dictations but occasionally words are mis-transcribed.)        Laila Chi MD  06/22/20 [id number]

## 2020-06-22 NOTE — PROGRESS NOTES
I have seen and examined the patient admitted by my colleague early hours of this morning. Patient transferred from [address] emergency room for further evaluation by neurologist for possible seizures. Patient apparently stopped taking all her medications, except aspirin for little more than 2 years ago especially for elevated blood pressure, since she moved to [place] and did not have a physician there and her cardiologist here left the area. She continues to smoke more than a pack of cigarettes per day and denies drinking alcohol. Patient was out with family members for a picnic and apparently patient was having severe headache in the frontal area and also in the  occipital area and she took some sinus pill given by her sister's boyfriend. An hour later, apparently she was noticed to have seizure-like activity, lasting approximately 7 minutes with tonic-clonic movements and after that she was not responding for approximately 20 minutes. Apparently she was noticed by brother that she was having shallow breathing's and was little cyanotic and did give her CPR and rescue breathsand it is documented in the emergency room that she did have a pulse though. Patient had CT of the head along with CTA Of the chest and abdomen and there was no acute intracranial bleed or PE and incidental cholelithiasis and diverticulosis noted. Meanwhile EMS was called and apparently she was desaturating requiring supplemental oxygen and there was no obvious tongue biting or urinary incontinence. Patient admits to having some chest/epigastric pain mostly after eating and having reflux-like symptoms and has been having some vomiting intermittently. Denies any obvious blood, but sometimes it is little dark. Patient also complaining of some pain in the left ankle after arriving here. She is not sure if she twisted her ankle.   Patient is more alert and awake urine drug screen shows opiates and patient denies taking any narcotics. And she is not sure what pills she received but thinks it is a sinus pill that she was given. Labs showed mildly elevated lactic acid of 2.5       Unresponsive episode with seizure like activity: Suspect new onset seizure , ? Etiology not clear  MRI of the brain, neurology consultation-, consider EEG    Gastroesophageal reflux disease with gastritis: Empiric PPI, if symptoms not improving more than a month will need EGD and GI referral    Lactic acidosis most likely secondary to seizure, gentle hydration, recheck      Musculoskeletal pain in the chest-most likely from CPR and also seizure    Left ankle sprain rule out fracture    Opiates in urine?   Denies taking any pain medications,    Essential hypertension noncompliant with medications: Started back on her metoprolol that she was on prior     Asymptomatic cholelithiasis  Colonic diverticulosis  Current smoker counseling done    History of pericarditis pericardial effusion, 2015  Asthma mild intermittent without exacerbation

## 2020-06-22 NOTE — CONSULTS
are all negative except what is mentioned in history of present illness. Physical Exam:  /74   Pulse 79   Temp 97.6 °F (36.4 °C) (Oral)   Resp 16   Ht 5' 6\" (1.676 m)   Wt 209 lb (94.8 kg)   LMP  (LMP Unknown)   SpO2 92%   BMI 33.73 kg/m²  I Body mass index is 33.73 kg/m². I   Wt Readings from Last 1 Encounters:   06/22/20 209 lb (94.8 kg)         General appearance - alert, well appearing, and in no distress, oriented to  person, place, and time and overweight, She is laying in bed,  there is bruise to right eye orbit, there is no tongue biting. Mental status -Level of Alertness: awake  Orientation: person, place, time  Memory: normal  Fund of Knowledge: normal  Attention/Concentration: normal  Language: normal. Mood is normal  Neck - supple, no neck adenopathy, carotids upstroke normal bilaterally, no carotid bruits. There is no LAP in the neck. There is no thyroid enlargement. Neurological -   Cranial [id number]:   Cranial nerve II: Normal. There is full visual fields, her right pupil is fixed and asymmetric (due to old childhood injury)  Cranial nerve III: Pupils: equal, round, reactive to light   Cranial nerves III, IV, VI: Extraocular Movements: intact   Cranial nerve V: Facial sensation: intact   Cranial nerve VII:Facial strength: intact   Cranial nerve VIII: Hearing: intact   Cranial nerve IX: Palate Elevation intact bilaterally  Cranial nerve XI: Shoulder shrug intact bilaterally  Cranial nerve XII: Tongue midline   neck supple without rigidity  DTR's are decreased distal and symmetric  Babinski sign is negative on bilaterally. Motor exam is 5/5 in the upper and lower extremities. Normal muscle tone. There is no muscle atrophy. There is no muscle fasciculation   Sensory is intact forlight touch  Coordination: finger to nose intact  Gait and station not tested  Abnormal movement none. Long tracts are  deferred.    Skin - no rashes or lesions, warm and dry to touch  Superficial temporal artery pulses are normal.   Musculoskeletal: Has no hand arthritis, no limitation of ROM in any of the four extremities. no joint tenderness, deformity or swelling. There is pain on palpation to her thoracic and lumbar spine. There is no leg edema. The Heart was regular in rate and rhythm. No heart murmur  Chest- Clear, good effort. Abdomen: soft, intact bowel sounds. Labs:    CBC:   Recent Labs     06/21/20  2350   WBC 8.4   HGB 13.5      MCV 90.6   MCH 29.4   MCHC 32.5   RDW 13.2     CMP:  Recent Labs     06/21/20  2350      K 4.0   CL 98   CO2 25   BUN 20   CREATININE 1.04*   GFRAA >60   LABGLOM 54*   GLUCOSE 178*   CALCIUM 8.7     Liver:   Recent Labs     06/21/20  2350   AST 22   ALT 17   ALKPHOS 91   PROT 7.4   LABALBU 4.4   BILITOT 0.20*     Reviewed   Results for orders placed during the hospital encounter of 06/21/20   CT head without contrast    Narrative EXAMINATION:  CT OF THE HEAD WITHOUT CONTRAST  6/22/2020 12:28 am    TECHNIQUE:  CT of the head was performed without the administration of intravenous  contrast. Dose modulation, iterative reconstruction, and/or weight based  adjustment of the mA/kV was utilized to reduce the radiation dose to as low  as reasonably achievable. COMPARISON:  None. HISTORY:  ORDERING SYSTEM PROVIDED HISTORY: Syncope, Head trauma, Unresponsive for 10  minutes. TECHNOLOGIST PROVIDED HISTORY:  ALtered mental status    Syncope, Head trauma, Unresponsive for 10 minutes. FINDINGS:  CT HEAD:    BRAIN/VENTRICLES: There is no acute intracranial hemorrhage, mass effect or  midline shift. No abnormal extra-axial fluid collection. The gray-white  differentiation is maintained without evidence of an acute infarct. There is  no evidence of hydrocephalus. ORBITS: The visualized portion of the orbits demonstrate no acute abnormality.     SINUSES: The visualized paranasal sinuses and mastoid air cells demonstrate  no acute

## 2020-06-22 NOTE — H&P
pericardial effusion. Thoracic aorta is normal caliber. No lymphadenopathy. Scattered calcified lymph nodes. Esophagus is unremarkable. Lungs/pleura: Lungs are clear. No pleural effusion or pneumothorax. Soft Tissues/Bones: Unremarkable. Abdomen/Pelvis: Organs: Cholelithiasis. Calcified splenic granulomas. Liver, pancreas, adrenals, are unremarkable. Small bilateral renal cysts measuring up to 1.2 cm. No hydronephrosis. GI/Bowel: Stomach and small bowel are unremarkable. The appendix is surgically absent. Diverticulosis throughout the colon without acute inflammatory changes. Pelvis: Bladder and uterus are unremarkable. No lymphadenopathy or free fluid. Peritoneum/Retroperitoneum: No lymphadenopathy or ascites. Mild to moderate atherosclerotic disease without abdominal aortic aneurysm. Bones/Soft Tissues: Degenerative disc disease at L5-S1.     1. No pulmonary embolism or acute pulmonary abnormality. 2. Cholelithiasis. 3. Colonic diverticulosis without acute diverticulitis. Ct Chest Pulmonary Embolism W Contrast    Result Date: 6/22/2020  EXAMINATION: CTA OF THE CHEST; CT OF THE ABDOMEN AND PELVIS WITH CONTRAST 6/22/2020 12:31 am TECHNIQUE: CTA of the chest and CT of the abdomen and pelvis was performed with the administration of 75 mL Isovue 370 intravenous contrast. Multiplanar reformatted images are provided for review. Dose modulation, iterative reconstruction, and/or weight based adjustment of the mA/kV was utilized to reduce the radiation dose to as low as reasonably achievable. COMPARISON: None HISTORY: Patient had a syncopal episode and was unresponsive for 10 minutes. Acute chest pain with hypoxia. Multiple episodes of vomiting. Upper GI bleeding. FINDINGS: Chest: Pulmonary Arteries: Pulmonary arteries are adequately opacified for evaluation. No intraluminal filling defect to suggest pulmonary embolism. Main pulmonary artery is normal in caliber.  Mediastinum: Heart is not enlarged but there is mild left ventricular hypertrophy. No pericardial effusion. Thoracic aorta is normal caliber. No lymphadenopathy. Scattered calcified lymph nodes. Esophagus is unremarkable. Lungs/pleura: Lungs are clear. No pleural effusion or pneumothorax. Soft Tissues/Bones: Unremarkable. Abdomen/Pelvis: Organs: Cholelithiasis. Calcified splenic granulomas. Liver, pancreas, adrenals, are unremarkable. Small bilateral renal cysts measuring up to 1.2 cm. No hydronephrosis. GI/Bowel: Stomach and small bowel are unremarkable. The appendix is surgically absent. Diverticulosis throughout the colon without acute inflammatory changes. Pelvis: Bladder and uterus are unremarkable. No lymphadenopathy or free fluid. Peritoneum/Retroperitoneum: No lymphadenopathy or ascites. Mild to moderate atherosclerotic disease without abdominal aortic aneurysm. Bones/Soft Tissues: Degenerative disc disease at L5-S1.     1. No pulmonary embolism or acute pulmonary abnormality. 2. Cholelithiasis. 3. Colonic diverticulosis without acute diverticulitis. EKG:  I have reviewed the EKG with the following interpretation: Sinus rhythm heart rate 73      Thank you No primary care provider on file. for the opportunity to be involved in this patient's care.     Electronically signed by JOSÉ LUIS Fernández CNP on 6/22/2020 at 6:29 AM

## 2020-06-22 NOTE — PROGRESS NOTES
EEG tech entered the room and explained the procedure. Patient began asking if this test is really necessary. She stated that she already had other tests this morning. She then had an episode of anxiety and refused the test. She states that she has to talk with her brother first for advice.

## 2020-06-23 ENCOUNTER — APPOINTMENT (OUTPATIENT)
Dept: GENERAL RADIOLOGY | Age: 65
DRG: 053 | End: 2020-06-23
Attending: FAMILY MEDICINE
Payer: MEDICAID

## 2020-06-23 VITALS
WEIGHT: 218.7 LBS | OXYGEN SATURATION: 92 % | HEIGHT: 66 IN | HEART RATE: 57 BPM | BODY MASS INDEX: 35.15 KG/M2 | RESPIRATION RATE: 18 BRPM | DIASTOLIC BLOOD PRESSURE: 88 MMHG | SYSTOLIC BLOOD PRESSURE: 171 MMHG | TEMPERATURE: 97.8 F

## 2020-06-23 LAB
ANION GAP SERPL CALCULATED.3IONS-SCNC: 10 MEQ/L (ref 8–16)
BUN BLDV-MCNC: 20 MG/DL (ref 7–22)
CALCIUM SERPL-MCNC: 8.4 MG/DL (ref 8.5–10.5)
CHLORIDE BLD-SCNC: 101 MEQ/L (ref 98–111)
CO2: 23 MEQ/L (ref 23–33)
CREAT SERPL-MCNC: 0.7 MG/DL (ref 0.4–1.2)
CULTURE: ABNORMAL
ERYTHROCYTE [DISTWIDTH] IN BLOOD BY AUTOMATED COUNT: 13.3 % (ref 11.5–14.5)
ERYTHROCYTE [DISTWIDTH] IN BLOOD BY AUTOMATED COUNT: 45.1 FL (ref 35–45)
GFR SERPL CREATININE-BSD FRML MDRD: 85 ML/MIN/1.73M2
GLUCOSE BLD-MCNC: 108 MG/DL (ref 70–108)
HCT VFR BLD CALC: 35.4 % (ref 37–47)
HEMOGLOBIN: 11.4 GM/DL (ref 12–16)
LACTIC ACID: 1 MMOL/L (ref 0.5–2.2)
LV EF: 55 %
LVEF MODALITY: NORMAL
Lab: ABNORMAL
MCH RBC QN AUTO: 29.7 PG (ref 26–33)
MCHC RBC AUTO-ENTMCNC: 32.2 GM/DL (ref 32.2–35.5)
MCV RBC AUTO: 92.2 FL (ref 81–99)
PLATELET # BLD: 178 THOU/MM3 (ref 130–400)
PMV BLD AUTO: 11.9 FL (ref 9.4–12.4)
POTASSIUM REFLEX MAGNESIUM: 4.2 MEQ/L (ref 3.5–5.2)
RBC # BLD: 3.84 MILL/MM3 (ref 4.2–5.4)
SODIUM BLD-SCNC: 134 MEQ/L (ref 135–145)
SPECIMEN DESCRIPTION: ABNORMAL
WBC # BLD: 8.4 THOU/MM3 (ref 4.8–10.8)

## 2020-06-23 PROCEDURE — 99239 HOSP IP/OBS DSCHRG MGMT >30: CPT | Performed by: INTERNAL MEDICINE

## 2020-06-23 PROCEDURE — 95819 EEG AWAKE AND ASLEEP: CPT

## 2020-06-23 PROCEDURE — 6370000000 HC RX 637 (ALT 250 FOR IP): Performed by: PHYSICIAN ASSISTANT

## 2020-06-23 PROCEDURE — 95816 EEG AWAKE AND DROWSY: CPT | Performed by: PSYCHIATRY & NEUROLOGY

## 2020-06-23 PROCEDURE — 83605 ASSAY OF LACTIC ACID: CPT

## 2020-06-23 PROCEDURE — 80048 BASIC METABOLIC PNL TOTAL CA: CPT

## 2020-06-23 PROCEDURE — 6370000000 HC RX 637 (ALT 250 FOR IP): Performed by: NURSE PRACTITIONER

## 2020-06-23 PROCEDURE — G0378 HOSPITAL OBSERVATION PER HR: HCPCS

## 2020-06-23 PROCEDURE — 96361 HYDRATE IV INFUSION ADD-ON: CPT

## 2020-06-23 PROCEDURE — 85027 COMPLETE CBC AUTOMATED: CPT

## 2020-06-23 PROCEDURE — 93306 TTE W/DOPPLER COMPLETE: CPT

## 2020-06-23 PROCEDURE — 71100 X-RAY EXAM RIBS UNI 2 VIEWS: CPT

## 2020-06-23 PROCEDURE — 6360000002 HC RX W HCPCS: Performed by: PHYSICIAN ASSISTANT

## 2020-06-23 PROCEDURE — 36415 COLL VENOUS BLD VENIPUNCTURE: CPT

## 2020-06-23 PROCEDURE — 96374 THER/PROPH/DIAG INJ IV PUSH: CPT

## 2020-06-23 PROCEDURE — 2580000003 HC RX 258: Performed by: NURSE PRACTITIONER

## 2020-06-23 RX ORDER — TOPIRAMATE 25 MG/1
25 TABLET ORAL NIGHTLY
Qty: 30 TABLET | Refills: 3 | Status: SHIPPED | OUTPATIENT
Start: 2020-06-23

## 2020-06-23 RX ORDER — TOPIRAMATE 25 MG/1
25 TABLET ORAL NIGHTLY
Status: DISCONTINUED | OUTPATIENT
Start: 2020-06-23 | End: 2020-06-23 | Stop reason: HOSPADM

## 2020-06-23 RX ORDER — PANTOPRAZOLE SODIUM 40 MG/1
40 TABLET, DELAYED RELEASE ORAL
Qty: 30 TABLET | Refills: 3 | Status: SHIPPED | OUTPATIENT
Start: 2020-06-24 | End: 2020-07-14 | Stop reason: SDUPTHER

## 2020-06-23 RX ORDER — MORPHINE SULFATE 2 MG/ML
0.5 INJECTION, SOLUTION INTRAMUSCULAR; INTRAVENOUS ONCE
Status: COMPLETED | OUTPATIENT
Start: 2020-06-23 | End: 2020-06-23

## 2020-06-23 RX ORDER — TRAMADOL HYDROCHLORIDE 50 MG/1
100 TABLET ORAL EVERY 6 HOURS PRN
Status: DISCONTINUED | OUTPATIENT
Start: 2020-06-23 | End: 2020-06-23

## 2020-06-23 RX ORDER — TOPIRAMATE 25 MG/1
25 TABLET ORAL 2 TIMES DAILY
Status: DISCONTINUED | OUTPATIENT
Start: 2020-06-23 | End: 2020-06-23

## 2020-06-23 RX ORDER — TRAMADOL HYDROCHLORIDE 50 MG/1
50 TABLET ORAL EVERY 6 HOURS PRN
Status: DISCONTINUED | OUTPATIENT
Start: 2020-06-23 | End: 2020-06-23

## 2020-06-23 RX ADMIN — SODIUM CHLORIDE: 9 INJECTION, SOLUTION INTRAVENOUS at 04:10

## 2020-06-23 RX ADMIN — PANTOPRAZOLE SODIUM 40 MG: 40 TABLET, DELAYED RELEASE ORAL at 06:21

## 2020-06-23 RX ADMIN — METOPROLOL TARTRATE 25 MG: 25 TABLET ORAL at 08:12

## 2020-06-23 RX ADMIN — ACETAMINOPHEN 650 MG: 325 TABLET ORAL at 01:32

## 2020-06-23 RX ADMIN — ACETAMINOPHEN 650 MG: 325 TABLET ORAL at 15:03

## 2020-06-23 RX ADMIN — MORPHINE SULFATE 0.5 MG: 2 INJECTION, SOLUTION INTRAMUSCULAR; INTRAVENOUS at 01:47

## 2020-06-23 RX ADMIN — ACETAMINOPHEN 650 MG: 325 TABLET ORAL at 08:12

## 2020-06-23 RX ADMIN — TRAMADOL HYDROCHLORIDE 50 MG: 50 TABLET, FILM COATED ORAL at 06:20

## 2020-06-23 ASSESSMENT — PAIN - FUNCTIONAL ASSESSMENT
PAIN_FUNCTIONAL_ASSESSMENT: PREVENTS OR INTERFERES SOME ACTIVE ACTIVITIES AND ADLS
PAIN_FUNCTIONAL_ASSESSMENT: PREVENTS OR INTERFERES SOME ACTIVE ACTIVITIES AND ADLS

## 2020-06-23 ASSESSMENT — PAIN DESCRIPTION - FREQUENCY
FREQUENCY: INTERMITTENT
FREQUENCY: INTERMITTENT

## 2020-06-23 ASSESSMENT — PAIN SCALES - GENERAL
PAINLEVEL_OUTOF10: 7
PAINLEVEL_OUTOF10: 4
PAINLEVEL_OUTOF10: 7
PAINLEVEL_OUTOF10: 0
PAINLEVEL_OUTOF10: 8
PAINLEVEL_OUTOF10: 2
PAINLEVEL_OUTOF10: 0
PAINLEVEL_OUTOF10: 5
PAINLEVEL_OUTOF10: 8
PAINLEVEL_OUTOF10: 5

## 2020-06-23 ASSESSMENT — PAIN DESCRIPTION - ONSET
ONSET: ON-GOING
ONSET: SUDDEN

## 2020-06-23 ASSESSMENT — PAIN DESCRIPTION - LOCATION
LOCATION: RIB CAGE
LOCATION: RIB CAGE

## 2020-06-23 ASSESSMENT — PAIN DESCRIPTION - PAIN TYPE
TYPE: ACUTE PAIN

## 2020-06-23 ASSESSMENT — PAIN DESCRIPTION - ORIENTATION
ORIENTATION: RIGHT
ORIENTATION: RIGHT

## 2020-06-23 ASSESSMENT — PAIN DESCRIPTION - PROGRESSION
CLINICAL_PROGRESSION: RESOLVED
CLINICAL_PROGRESSION: GRADUALLY WORSENING
CLINICAL_PROGRESSION: RAPIDLY WORSENING
CLINICAL_PROGRESSION: GRADUALLY IMPROVING

## 2020-06-23 ASSESSMENT — PAIN DESCRIPTION - DESCRIPTORS
DESCRIPTORS: SHARP
DESCRIPTORS: SHOOTING

## 2020-06-23 NOTE — PROGRESS NOTES
ulcer and GERD. Reports she hasn't had recent visit to GI or doctor about current GI issues. Admitted with seizure. Admits she drinks pots of regular coffee, pepper, etc.  Stated when she cut back on coffee her symptoms improved. Plans to see GI. On protonix, IVF's, zofran, phenergan. Agreed to Ensure Clear TID. Denies any weight loss. · Wound Type: None  · Current Nutrition Therapies:  · Oral Diet Orders: General   · Oral Diet intake: %  · Oral Nutrition Supplement (ONS) Orders: Clear Liquid Oral Supplement(Ensure Clear TID)  · ONS intake: Unable to assess  · Anthropometric Measures:  · Ht: 5' 6\" (167.6 cm)   · Current Body Wt: 218 lb 11.2 oz (99.2 kg)(6/23/20 no edema)  · Admission Body Wt: 209 lb (94.8 kg)(6/22/20 no edema bedscale)  · Usual Body Wt: (182-185# per pt. Per EMR: 3/17/16: 181# )  · % Weight Change:  ,  Pt denies any significant weight changes, no recent EMR weights to verify  · Ideal Body Wt: 130 lb (59 kg),   · BMI Classification: BMI 35.0 - 39.9 Obese Class II    Nutrition Interventions:   Continue current diet, Start ONS  Continued Inpatient Monitoring, Education Completed, Coordination of Care(GERD diet recommendations reviewed, handout provided as well 6/23/20)    Nutrition Evaluation:   · Evaluation: Goals set   · Goals: Patient will tolerate current diet and consume 75% or more of meals during LOS.     · Monitoring: Meal Intake, Supplement Intake, Diet Tolerance, Weight, Pertinent Labs, Nausea or Vomiting      Electronically signed by Milana Bourne RD, LD on 6/23/20 at 11:56 AM EDT    Contact Number: [phone]

## 2020-06-23 NOTE — PLAN OF CARE
Problem: Nutrition  Goal: Optimal nutrition therapy  Outcome: Ongoing   Nutrition Problem: Inadequate oral intake  Intervention: Food and/or Nutrient Delivery: Continue current diet, Start ONS  Nutritional Goals: Patient will tolerate current diet and consume 75% or more of meals during LOS.

## 2020-06-23 NOTE — PLAN OF CARE
Ongoing  6/22/2020 2354 by Mandeep Lyn RN  Outcome: Ongoing  Note: No signs of skin breakdown. Skin warm, dry, and intact. Mucous membranes pink and moist.  Assistance with turns/ambulation provided PRN. Will continue to monitor.

## 2020-06-23 NOTE — PROCEDURES
[address] [place] [address]                          ELECTROENCEPHALOGRAM REPORT    PATIENT NAME: Tang William                   :        1955  MED REC NO:   [id number]                           ROOM:       0003  ACCOUNT NO:   [de-identified]                           ADMIT DATE: 2020  PROVIDER:     Olaf Kaur MD    DATE OF EE2020    REFERRING PROVIDER:  Thelma Carson MD    CLINICAL HISTORY:  A 81-year-old female presenting with headache, visual  disturbance. CLINICAL INTERPRETATION:  This is a 17-channel EEG performed without  sleep deprivation. Hyperventilation and photic stimulation were  performed. The patient is described as alert. Background rhythm activity is noted to be 10 Hz in the posterior  parietal area, symmetric, well modulated, attenuates with eye opening. The patient is noted to be drowsy during parts of recording. Hyperventilation was not performed. Photic stimulation was performed  with driving seen through some of the frequencies. There was no  evidence of epileptiform activity appreciated throughout this recording. IMPRESSION:  This is a normal EEG. There was no evidence of  epileptiform activity appreciated.         Farida Gooden MD    D: 2020 13:21:03       T: 2020 13:26:49     [email or web address]  Job#: [id number]     Doc#: [id number]    CC:

## 2020-06-23 NOTE — PROGRESS NOTES
potassium alternative oral replacement **OR** potassium chloride, bisacodyl, butalbital-acetaminophen-caffeine      Intake/Output Summary (Last 24 hours) at 6/23/2020 1032  Last data filed at 6/23/2020 1023  Gross per 24 hour   Intake 2623.51 ml   Output 400 ml   Net 2223.51 ml       Diet:  DIET GENERAL;  Dietary Nutrition Supplements: Clear Liquid Oral Supplement    Exam:  BP (!) 144/75   Pulse 63   Temp 97.4 °F (36.3 °C) (Oral)   Resp 19   Ht 5' 6\" (1.676 m)   Wt 218 lb 11.2 oz (99.2 kg)   LMP  (LMP Unknown)   SpO2 97%   BMI 35.30 kg/m²     Physical Examination:   General appearance - alert, awake  and in mild distress from pain in the right side of chest wall, obese, bruising on right side of face just lateral to the eyebrow  HEENT: Normocephalic, Atraumatic, pupils reactive,  Right pupil anisocoria, decreased vision, mucous membranes moist  Chest - moving equally to respiration,symmetric air entry, clear to auscultation, chest wall pain in middle and right side and back  Heart - normal rate, regular rhythm, normal S1, S2, no murmurs,   Abdomen - soft, nontender, mild distended, no masses or organomegaly, BS+  Neurological - alert, oriented, normal speech, sensations intact and able to move all extremities   Extremities - peripheral pulses normal, no pedal edema,  Capillary refill less than 3 sec  Skin - normal coloration and turgor, no rashes      Labs:   Recent Labs     06/21/20  2350 06/22/20  1250 06/23/20  0353   WBC 8.4 7.8 8.4   HGB 13.5 13.8 11.4*   HCT 41.6 37.9 35.4*    200 178     Recent Labs     06/21/20  2350 06/22/20  1250 06/23/20  0353    134* 134*   K 4.0 4.2 4.2   CL 98 97* 101   CO2 25 22* 23   BUN 20 17 20   CREATININE 1.04* 0.9 0.7   CALCIUM 8.7 9.4 8.4*     Recent Labs     06/21/20  2350   AST 22   ALT 17   BILIDIR <0.08   BILITOT 0.20*   ALKPHOS 91     Recent Labs     06/21/20  2350   INR 1.0     Recent Labs     06/22/20  1250   CKTOTAL 109     No results for input(s): 6/22/2020 10:57 AM      XR RIBS RIGHT (2 VIEWS)    (Results Pending)       DVT prophylaxis: [] Lovenox                                 [] SCDs                                 [] SQ Heparin                                 [x] Encourage ambulation           [] Already on Anticoagulation     Code Status: Full Code    Tele:   [x] yes             [] no    Active Hospital Problems    Diagnosis Date Noted    New onset seizure ([facility] [address] [R56.9] 06/22/2020    Hypertension [I10]     Headache [R51]        Electronically signed by Kenya Dooley MD on 6/23/2020 at 10:32 AM

## 2020-06-23 NOTE — DISCHARGE SUMMARY
Hospital Medicine Discharge Summary      Patient Identification:   Delene Schaumann   : 1955  MRN: [id number]   Account: [de-identified]      Patient's PCP: No primary care provider on file. Admit Date: 2020     Discharge Date:   2020     Admitting Physician: Vicente Montiel MD     Discharge Physician: Raquel Tesfaye MD     Discharge Diagnoses:    Unresponsive episode  New onset seizure   Chronic headaches suspect migraine versus tension headache-trial of Topamax  Gastroesophageal reflux disease with gastritis secondary to NSAIDs  Lactic acidosis secondary to seizure  Musculoskeletal pain secondary to seizures  Left ankle osteoarthritis  Opiates in urine denies any substance abuse  Essential hypertension  Asymptomatic cholelithiasis  Colonic diverticulosis  Current smoker   Right pupil anisocoria, history of childhood trauma, decreased visual acuity  History of pericarditis pericardial effusion,   Asthma mild intermittent without exacerbation  History of renal stones  History of peptic ulcer disease? The patient was seen and examined on day of discharge and this discharge summary is in conjunction with any daily progress note from day of discharge.     Hospital Course:   Delene Schaumann is a 60 y.o. female admitted to [address] [place] [address] on 2020 for Patient transferred from [facility] emergency room for further evaluation by neurologist for possible seizures.     Patient apparently stopped taking all her medications, except aspirin for little more than 2 years ago especially for elevated blood pressure, since she moved to [place] and did not have a physician there and her cardiologist here left the area.  She continues to smoke more than a pack of cigarettes per day and denies drinking alcohol.  Patient was out with family members for a picnic and apparently patient was having severe headache in the frontal area and also in the  occipital area and she took some sinus pill given by her sister's boyfriend.  An hour later, apparently she was noticed to have seizure-like activity, lasting approximately 7 minutes with tonic-clonic movements and after that she was not responding for approximately 20 minutes.  Apparently she was noticed by brother that she was having shallow breathing's and was little cyanotic and did give her CPR and rescue breathsand it is documented in the emergency room that she did have a pulse though. Patient had CT of the head along with CTA Of the chest and abdomen and there was no acute intracranial bleed or PE and incidental cholelithiasis and diverticulosis noted.     Meanwhile EMS was called and apparently she was desaturating requiring supplemental oxygen and there was no obvious tongue biting or urinary incontinence.  Patient admits to having some chest/epigastric pain mostly after eating and having reflux-like symptoms and has been having some vomiting intermittently.  Denies any obvious blood, but sometimes it is little dark.     Patient also complaining of some pain in the left ankle after arriving here. Kimmie Garcia is not sure if she twisted her ankle.  Patient is more alert and awake urine drug screen shows opiates and patient denies taking any narcotics.  And she is not sure what pills she received but thinks it is a sinus pill that she was given. Labs showed mildly elevated lactic acid of 2.5      Patient admitted to the hospital and continued on gentle hydration and was having generalized body aches and right-sided chest pain and headaches. Patient empirically treated with Fioricet. MRI of the brain did not show any acute strokes. Patient was started on metoprolol and patient's blood pressure is better. Patient initially refused EEG on the day of admission but subsequently  did agree to get and was done on 6/23 and did not show any epileptiform activity. There was no obvious rib fractures noted.   With the Protonix that was started empirically, her gastritis and DIET GENERAL;  Dietary Nutrition Supplements: Clear Liquid Oral Supplement      Follow-up visits:   Dr. Nory Mercado and [address] is not accepting patients but his partner has availability. You must get an application from office, complete, and mail back prior to apt being made. Ann 7, err             Discharge Medications:      Lisa Wilson   Home Medication Instructions AV    Printed on:20 1133   Medication Information                      metoprolol tartrate (LOPRESSOR) 25 MG tablet  Take 1 tablet by mouth 2 times daily             pantoprazole (PROTONIX) 40 MG tablet  Take 1 tablet by mouth every morning (before breakfast) To take on empty stomach 45 min before eating,             topiramate (TOPAMAX) 25 MG tablet  Take 1 tablet by mouth nightly                 Time Spent on discharge is more than 34 min in the examination, evaluation, counseling and review of medications and discharge plan. Signed: Thank you No primary care provider on file. for the opportunity to be involved in this patient's care.     Electronically signed by Dasha Colunga MD on 2020 at 11:33 AM

## 2020-06-23 NOTE — PROGRESS NOTES
[facility] Laboratory Technician Worksheet      EEG Date: 2020    Name: Raffy Amato   : 1955   Age: 60 y.o. SEX: female    ROOM: [facility] MRN: [id number]           CSN: [id number]      Ordering Provider: Jacobo Hawley  EEG Number: [id number] Time of Test:  0831    Hand: Right   Sedation: no    H.V. Done: No NOT DONE Photic: Yes    Sleep: No  Drowsy: Yes   Sleep Deprived: No    Seizures observed: no    Mentality: alert      Clinical History:PATIENT HAD SEVER H/A, TOOK SINUS PILL THEN 1 HOUR LATER HAD A TONIC CLINIC SEIZURE LASTING 7 MINUTES. WAS UNRESPONSIVE FOR 20 MINUTES AFTER AND WAS CYANOTIC AND WAS GIVEN RESCUE BREATHS.      Past Medical History:       Diagnosis Date    Asthma     GERD (gastroesophageal reflux disease)     Headache     Hypertension     Pneumonia        Scheduled Meds:   metoprolol tartrate  25 mg Oral BID    sodium chloride flush  10 mL Intravenous 2 times per day    pantoprazole  40 mg Oral QAM AC    nicotine  1 patch Transdermal Daily     Continuous Infusions:  PRN Meds:.sodium chloride flush, acetaminophen **OR** acetaminophen, promethazine **OR** ondansetron, potassium chloride **OR** potassium alternative oral replacement **OR** potassium chloride, bisacodyl, butalbital-acetaminophen-caffeine    Technician: [address] 2020

## 2020-06-23 NOTE — FLOWSHEET NOTE
06/23/20 0138   Provider Notification   Reason for Communication Review case  (increased pain)   Provider Name [facility]   Provider Notification Physician Assistant   Method of Communication Call   Response See orders   Notification Time 0138     Notified [facility] that patient has increased right-sided rib pain unrelieved by tylenol. See orders for one time dose of Morphine.

## 2020-06-23 NOTE — PLAN OF CARE
Problem: Pain:  Goal: Pain level will decrease  Description: Pain level will decrease  Outcome: Ongoing  Note: Patient able to use 0-10 pain scale. Denies pain at this time. Patient has a pain goal of \"no pain. \" Agreeable to take PRN pain medications. Problem: Falls - Risk of:  Goal: Will remain free from falls  Description: Will remain free from falls  Outcome: Ongoing  Note: Call light in reach, bed in lowest position, and bed alarm activated. Education given on use of call light before ambulation and when in need of assistance. Patient expressed understanding. Hourly visual checks performed and charted. Toileting offered to patient. No falls this shift, at any time. Arm band and falling star in place. Will continue to monitor. Goal: Absence of physical injury  Description: Absence of physical injury  Outcome: Ongoing  Note: Call light in reach, bed in lowest position, and bed alarm activated. Education given on use of call light before ambulation and when in need of assistance. Patient expressed understanding. Hourly visual checks performed and charted. Toileting offered to patient. No falls this shift, at any time. Arm band and falling star in place. Will continue to monitor. Problem: SKIN INTEGRITY  Goal: Skin integrity is maintained or improved  Outcome: Ongoing  Note: No signs of skin breakdown. Skin warm, dry, and intact. Mucous membranes pink and moist.  Assistance with turns/ambulation provided PRN. Will continue to monitor. Problem: DISCHARGE BARRIERS  Goal: Patient's continuum of care needs are met  Outcome: Ongoing  Note: Discharge planning in process and discussed with patient/family. Social work consulted for any additional needs. Care manager aware of discharge needs. Patient is from home with family and plans to return there upon discharge.      Problem: Physical Regulation:  Goal: Ability to maintain a stable neurologic state will improve  Description: Ability to maintain a stable neurologic state will improve  Outcome: Ongoing  Note: No signs or symptoms of seizures noted. Seizure precautions in place. Care plan reviewed with patient and family. Patient and family verbalize understanding of the plan of care and contribute to goal setting.

## 2020-07-14 ENCOUNTER — OFFICE VISIT (OUTPATIENT)
Dept: CARDIOLOGY | Age: 65
End: 2020-07-14
Payer: MEDICAID

## 2020-07-14 VITALS
OXYGEN SATURATION: 98 % | SYSTOLIC BLOOD PRESSURE: 146 MMHG | HEIGHT: 67 IN | HEART RATE: 58 BPM | DIASTOLIC BLOOD PRESSURE: 78 MMHG | BODY MASS INDEX: 31.08 KG/M2 | RESPIRATION RATE: 18 BRPM | WEIGHT: 198 LBS

## 2020-07-14 PROCEDURE — G8427 DOCREV CUR MEDS BY ELIG CLIN: HCPCS | Performed by: INTERNAL MEDICINE

## 2020-07-14 PROCEDURE — 99204 OFFICE O/P NEW MOD 45 MIN: CPT | Performed by: INTERNAL MEDICINE

## 2020-07-14 PROCEDURE — G8417 CALC BMI ABV UP PARAM F/U: HCPCS | Performed by: INTERNAL MEDICINE

## 2020-07-14 RX ORDER — OMEPRAZOLE 40 MG/1
40 CAPSULE, DELAYED RELEASE ORAL
Qty: 90 CAPSULE | Refills: 3 | Status: SHIPPED | OUTPATIENT
Start: 2020-07-14

## 2020-07-14 NOTE — PATIENT INSTRUCTIONS
SURVEY:    You may be receiving a survey from [employer] regarding your visit today. Please complete the survey to enable us to provide the highest quality of care to you and your family. If you cannot score us a very good on any question, please call the office to discuss how we could have made your experience a very good one. Thank you.

## 2020-07-14 NOTE — PROGRESS NOTES
Corie Lala am scribing for and in the presence of Anjel Rosario MD, F.A.C.C. Patient: Thu Matthew  : 1955  Date of Visit: 2020    REASON FOR VISIT / CONSULTATION: New Patient ([facility] admitted due to  for loss of cosciuness. EKG done on , Echo done on . HX: HTN. C/o: Flutter feeling since syncope episode. Denies: CP, lighateded/dizziness, SOB. )      History of Present Illness:        Dear No primary care provider on file. I had the pleasure of seeing  Thu Matthew in my office today. Ms. Otis Arreola is a 60 y.o. female with a recent syncope episode on 2020. She was sent to [facility] for further evaluation. Her history of loss of consciousness is so complicated and there is marked discrepancy between the story she was telling us and the documented discharge summary from [facility]. Patient has history of hypertension and occasional migraine headaches. Used to be on treatment but she stopped taking medicines for about 2 years. Said she lost her cardiologist and she was taking care of her  who  last year. She was in the family gathering on 2020 and started having headache. The headache was severe. She took some sinus pill given by her sister's boyfriend and about an hour later she lost consciousness. There was some mention of approximately 7 minutes of tonic-clonic seizure like activities but patient said her other family members did not mention any seizure activity. Her brother started doing CPR on her. She was cyanotic and on arrival of the EMS she was given oxygen. She started to wake up while in the ambulance not knowing anything of what has happened but mentioned that by the time she was awake she was aware of everything surrounding her. No post ictal confusion. She denies any palpitations or chest pain prior to the episode. She fell and fractured her right ribs in the back. She denied any tongue biting. She is unsure if she lost control in her urine. No loss of bowel continence. Work-up at [facility] included CT of the brain, MRI of the brain, EEG and CTA of the chest.  Nothing to explain her loss of consciousness the only positive finding mild urine test positive for opiates. Patient denied any narcotic use. Again she mentioned that she took that pill from her sister's boyfriend assuming this is a sinus medicine. She has had no dizzy spells since that time. No further syncopal episodes. No chest pain, pressure or tightness. Her headache is much better since restarting her blood pressure migraine medicines. She denied any shortness of breath, orthopnea or PND. She does not think she drinks enough fluid. Also since starting Prilosec she felt heart palpations however now they have improved. She is an everyday smoker. Family history consist of her older brother having stents who is in his 66's. Her mother dies of a heart attack in her 52's. Also her other two brothers have heart issues. She liz having any history of diabetes or hyperlipidemia. She does have a history of pericarditis. She has never drank alcohol. She did have migraines in the past however since starting on her beta blocker she no longer does. Her   about a year ago and since she has not slept well. She is anxious but denied any suicidal ideation. She is interested in treatment for her anxiety. I told her we will set her up with Tyler timmons to establish care with a primary care provider. She considers her self fairly active. She has gained weight. She used to weigh 170 and now she is now almost 200 lbs. She reports having a relatively good exercise tolerance and denied any current or recent chest pain, shortness of breath, abdominal pain, bleeding problems, problems with her medications or any other concerns at this time. Echo done on 2020: Normal ejection fraction and wall motion. Nothing to explain her syncope. Cardiac event monitor done, pending result. PAST MEDICAL HISTORY:         Past Medical History:   Diagnosis Date    Asthma     GERD (gastroesophageal reflux disease)     Headache     Hypertension     Pneumonia        CURRENT ALLERGIES: Shellfish-derived products; Iodine; and Shellfish-derived products REVIEW OF SYSTEMS: 14 systems were reviewed. Pertinent positives and negatives as above, all else negative. Past Surgical History:   Procedure Laterality Date    ANKLE SURGERY Left     ANKLE SURGERY      APPENDECTOMY      BREAST SURGERY  2016    [address]      for colitis and sepsis in 2003    EYE SURGERY Right     EYE SURGERY      FRACTURE SURGERY      Social History:  Social History     Tobacco Use    Smoking status: Current Every Day Smoker     Packs/day: 1.50    Smokeless tobacco: Never Used   Substance Use Topics    Alcohol use: No    Drug use: No        CURRENT MEDICATIONS:        Outpatient Medications Marked as Taking for the 7/14/20 encounter (Office Visit) with Agustin Green MD   Medication Sig Dispense Refill    metoprolol tartrate (LOPRESSOR) 25 MG tablet Take 1 tablet by mouth 2 times daily 60 tablet 3    pantoprazole (PROTONIX) 40 MG tablet Take 1 tablet by mouth every morning (before breakfast) To take on empty stomach 45 min before eating, 30 tablet 3    topiramate (TOPAMAX) 25 MG tablet Take 1 tablet by mouth nightly 30 tablet 3       FAMILY HISTORY: family history includes Cancer (age of onset: 70) in her father; Diabetes in her mother; Heart Disease in her mother; Other in her mother. Physical Examination:     BP (!) 146/78 (Site: Right Upper Arm, Position: Sitting, Cuff Size: Large Adult)   Pulse 58   Resp 18   Ht 5' 6.5\" (1.689 m)   Wt 198 lb (89.8 kg)   LMP  (LMP Unknown)   SpO2 98%   BMI 31.48 kg/m²  Body mass index is 31.48 kg/m². Constitutional: She appeared oriented to person and place.  She and EEG are unremarkable. · Elevated lactate, treated with gentle hydration. This probably secondary to transient hypotension. · Positive opiates on urine, patient denied narcotic use but she said she was given a pill by her sister's boyfriend for the treatment of her bad headache an hour prior to the episode. · Although this can be an etiology of her loss of consciousness, cannot rule out malignant arrhythmia. · Luckily she has no further dizziness or lightheadedness. No further passing out episodes. · Event monitor is pending. · I will get Lexiscan stress test for further evaluation. · beta Blocker Therapy: Continue Metoprolol tartrate (Lopressor)  25 mg  twice daily I discussed the potential side effects of this medication including lightheadedness and dizziness and told her to call the office if this occurs. · Nonpharmacologic counseling: Because of her condition, I reminded her to try and keep herself well-hydrated and to take extra time when moving from laying to sitting, sitting to standing and standing to walking. I also explained to her to help improve her symptoms she should include 3 g sodium diet, 1 or 2 L of sports drinks daily, knee-high compressions stockings. · Additional Testing List: Because current signs and symptoms can certainly be caused by significant coronary artery disease, I ordered a Regadenoson (Lexiscan) stress test with SPECT imaging to try and rule out this possibility. · Additional Testing: Also ordered a Carotid Ultra Sound   · Laboratory testing: Orderd a Lipid Panel, CBC, BMP and a TSH to verify all of her levels at this time.  GERD:  · Patient said she was doing much better while on omeprazole compared to Protonix. .  · STOP pantoprazole (Protonix) due to side effects and START omeprazole (Prilosec) 40 mg daily. Also consider using TUMS and/or Maalox as needed. Call if symptoms persist or worsen.        Essential Hypertension: Borderline controlled  · Beta Blocker: Continue Metoprolol tartrate (Lopressor) 25 mg bid. · Depression/ Anxiety/ Migraines:   · No suicidal ideation. · Ref[facility] to [facility] to get established with him and get evaluated for her anxiety. Tobacco Abuse Counseling: I spent several minutes discussing the dangers of tobacco abuse as well as multiple methods for trying to quit smoking. In the end, Ms. Brennen Roque said that she did not want any assistance at this time but would continue to try and quit reduce and eventually quit smoking in the near future. Obesity: Body mass index is 31.48 kg/m². I also briefly discussed both diet and exercise strategies for her to continue to loses weight and she was very receptive to this. Finally, I recommended that she continue her other medications and follow up with you as previously scheduled. FOLLOW UP:   I told Ms. Hightower to call my office if she had any problems, but otherwise I asked her to Return in about 3 months (around 10/14/2020). However, I would be happy to see her sooner should the need arise. Sincerely,  Ramakrishna Laird MD, F.A.C.C. [facility] Cardiology Specialist    [address]  Phone: [phone], Fax: [phone]     I believe that the risk of significant morbidity and mortality related to the patient's current medical conditions are: intermediate-high. The documentation recorded by the scribe, accurately and completely reflects the services I personally performed and the decisions made by me. Ramakrishna Laird MD, F.A.C.C.  July 14, 2020

## 2020-07-23 ENCOUNTER — HOSPITAL ENCOUNTER (OUTPATIENT)
Dept: NON INVASIVE DIAGNOSTICS | Age: 65
Discharge: HOME | End: 2020-07-23
Payer: MEDICAID

## 2020-07-23 PROCEDURE — 78451 HT MUSCLE IMAGE SPECT SING: CPT

## 2020-07-23 PROCEDURE — 93017 CV STRESS TEST TRACING ONLY: CPT

## 2020-07-23 PROCEDURE — 6360000002 HC RX W HCPCS: Performed by: FAMILY MEDICINE

## 2020-07-23 PROCEDURE — 3430000000 HC RX DIAGNOSTIC RADIOPHARMACEUTICAL: Performed by: INTERNAL MEDICINE

## 2020-07-23 PROCEDURE — A9500 TC99M SESTAMIBI: HCPCS | Performed by: INTERNAL MEDICINE

## 2020-07-23 RX ADMIN — REGADENOSON 0.4 MG: 0.08 INJECTION, SOLUTION INTRAVENOUS at 08:56

## 2020-07-23 RX ADMIN — Medication 30 MILLICURIE: at 08:56

## 2020-07-24 ENCOUNTER — HOSPITAL ENCOUNTER (OUTPATIENT)
Dept: VASCULAR LAB | Age: 65
Discharge: HOME | End: 2020-07-26
Payer: MEDICAID

## 2020-07-24 ENCOUNTER — HOSPITAL ENCOUNTER (OUTPATIENT)
Dept: NON INVASIVE DIAGNOSTICS | Age: 65
Discharge: HOME | End: 2020-07-24
Payer: MEDICAID

## 2020-07-24 PROCEDURE — 93880 EXTRACRANIAL BILAT STUDY: CPT

## 2020-07-24 PROCEDURE — 3430000000 HC RX DIAGNOSTIC RADIOPHARMACEUTICAL: Performed by: INTERNAL MEDICINE

## 2020-07-24 PROCEDURE — 78452 HT MUSCLE IMAGE SPECT MULT: CPT

## 2020-07-24 PROCEDURE — A9500 TC99M SESTAMIBI: HCPCS | Performed by: INTERNAL MEDICINE

## 2020-07-24 RX ADMIN — Medication 30 MILLICURIE: at 13:15

## 2020-07-27 ENCOUNTER — TELEPHONE (OUTPATIENT)
Dept: CARDIOLOGY | Age: 65
End: 2020-07-27

## 2020-07-27 NOTE — PROCEDURES
[address], [place] [address]                              CARDIAC STRESS TEST    PATIENT NAME: Kirstin Harman                   :        1955  MED REC NO:   [id number]                              ROOM:  ACCOUNT NO:   [de-identified]                           ADMIT DATE: 2020  PROVIDER:     Monet Moreno    CARDIOVASCULAR DIAGNOSTIC DEPARTMENT    DATE OF STUDY:  2020    ORDERING PROVIDER:  Na Craft MD    PRIMARY CARE PROVIDER:  None    INTERPRETING PHYSICIAN:  Monet Moreno MD    EXERCISE MYOCARDIAL PERFUSION STRESS TEST REPORT    Stress/rest single-isotope SPECT imaging with exercise stress and gated  SPECT imaging    INDICATION:  Assessment of a cardiac cause of:  Syncope    CLINICAL HISTORY:  The patient is a 72-year-old woman with no known  coronary artery disease. Previous cardiac history includes:  Stress test    Other previous history includes:  Palpitations, dyspnea,  lightheadedness, syncope, indigestion, heartburn, smoker, family history  of CAD in mother and siblings with CAD, hypertension    Symptoms just prior to testing included:  None    Relevant medications:  Metoprolol    PROCEDURE:  The patient performed treadmill exercise using a Elmer  protocol, completing 4:15 minutes and completing an estimated workload  of 8.87 metabolic equivalents (METS). The patient was unable to continue exercise testing due to shortness of  breath, leg pain and chest pain, and so regadenoson, at a dose of 0.4  mg, was given in order to optimize cardiac stress imaging. The test was terminated due to fatigue and shortness of breath. The heart rate was 55 beats per minute at baseline and increased to 97  beats per minute at peak exercise, which was 61% of the maximum  predicted heart rate. The rest blood pressure was 152/84 mmHg and  increased to 170/88 mmHg, which is a normal response.  During the  procedure, the patient developed fatigue, shortness of breath, leg  fatigue and chest pain (1/3). Myocardial perfusion imaging: Imaging was performed at rest 30-45  minutes following the injection of 30.0 mCi of sestamibi. At peak  exercise, the patient was injected with 30.0 mCi of sestamibi and  exercise was continued for 1 minute. Gating post-stress tomographic  imaging was performed 30-45 minutes after stress. STRESS ECG RESULTS:  The resting electrocardiogram demonstrated sinus  bradycardia without definitive ST-segment abnormalities suggestive of  myocardial ischemia. At peak exercise and during recovery, the patient developed:    No significant ST segment changes suggestive of myocardial ischemia with  no premature atrial contractions (PACs) and no premature ventricular  contractions (PVCs). NUCLEAR IMAGING RESULTS:  The overall quality of the study is good. Mild/moderate attenuation artifact was seen. There is no evidence of  abnormal lung uptake. Additionally, the right ventricle appears normal.  The left ventricular cavity is noted to be normal in size on the stress  images. There is no evidence of transient ischemic dilatation (TID) of  the left ventricle. Gated SPECT imaging reveals normal myocardial thickening and wall motion  with a calculated left ventricular ejection fraction of 77%. The rest images demonstrated a small perfusion abnormality of mild  intensity in the anteroseptal region(s) which is most likely due to  artifact. On stress imaging, a small/moderate perfusion abnormality of moderate  intensity was noted in the anterior and anteroseptal region(s) which may  be due to artifact. IMPRESSION:  1. Abnormal myocardial perfusion study. There is a small/moderate  perfusion defect of moderate intensity in the anterior and anteroseptal  regions during stress imaging, which is most consistent with ischemia  but may be due to artifact.     2.  Global left ventricular systolic function was normal without  regional wall motion abnormalities. 3.  No significant electrocardiographic evidence of myocardial ischemia  during EKG monitoring without significant associated arrhythmias. The patient's Duke Treadmill score is 3, which correlates with an  intermediate risk for significant coronary artery disease. Overall, these results are most consistent with a low/intermediate risk  for significant coronary artery disease. Additional testing including cardiac catheterization may be indicated. The sensitivity for detecting ischemia on this test may have been  reduced due to the patient being on a beta blocker. The results of this test were discussed with Dr. Renny Alex on 07/24/2020 at  4:44 p.mEstella Green    D: 07/27/2020 11:08:43       T: 07/27/2020 11:10:15     COURTNEY/NATE_EDIT  Job#: [id number]     Doc#: Unknown    CC:  Pierre Figueredo.  Renny Alex

## 2020-07-27 NOTE — TELEPHONE ENCOUNTER
----- Message from Connor Julien MD sent at 7/26/2020 12:08 PM EDT -----  Carotid ultrasound is good. No significant blockages.   Thank you

## 2020-07-28 ENCOUNTER — TELEPHONE (OUTPATIENT)
Dept: CARDIOLOGY | Age: 65
End: 2020-07-28

## 2020-07-28 NOTE — TELEPHONE ENCOUNTER
Ms. Carmen Marla called in wanting you to personally call her back, she is very worried and would like to talk to you personally. Her phone number is [phone].  Thanks!!

## 2020-07-28 NOTE — TELEPHONE ENCOUNTER
----- Message from Garland Gonzalez MD sent at 7/27/2020 10:27 PM EDT -----  Some abnormalities in the stress test.  Please let me see her sometime this week or early next week.   Thank you

## 2020-08-13 ENCOUNTER — OFFICE VISIT (OUTPATIENT)
Dept: CARDIOLOGY | Age: 65
End: 2020-08-13
Payer: MEDICAID

## 2020-08-13 VITALS
DIASTOLIC BLOOD PRESSURE: 87 MMHG | WEIGHT: 198.6 LBS | HEART RATE: 61 BPM | BODY MASS INDEX: 31.92 KG/M2 | OXYGEN SATURATION: 100 % | RESPIRATION RATE: 18 BRPM | HEIGHT: 66 IN | SYSTOLIC BLOOD PRESSURE: 181 MMHG

## 2020-08-13 PROCEDURE — 93000 ELECTROCARDIOGRAM COMPLETE: CPT | Performed by: INTERNAL MEDICINE

## 2020-08-13 PROCEDURE — 4004F PT TOBACCO SCREEN RCVD TLK: CPT | Performed by: INTERNAL MEDICINE

## 2020-08-13 PROCEDURE — G8417 CALC BMI ABV UP PARAM F/U: HCPCS | Performed by: INTERNAL MEDICINE

## 2020-08-13 PROCEDURE — G8427 DOCREV CUR MEDS BY ELIG CLIN: HCPCS | Performed by: INTERNAL MEDICINE

## 2020-08-13 PROCEDURE — 3017F COLORECTAL CA SCREEN DOC REV: CPT | Performed by: INTERNAL MEDICINE

## 2020-08-13 PROCEDURE — 99215 OFFICE O/P EST HI 40 MIN: CPT | Performed by: INTERNAL MEDICINE

## 2020-08-13 RX ORDER — ATORVASTATIN CALCIUM 10 MG/1
10 TABLET, FILM COATED ORAL DAILY
Qty: 90 TABLET | Refills: 1 | Status: SHIPPED | OUTPATIENT
Start: 2020-08-13

## 2020-08-13 RX ORDER — ASPIRIN 81 MG/1
81 TABLET ORAL DAILY
Qty: 30 TABLET | Refills: 3 | COMMUNITY
Start: 2020-08-13

## 2020-08-13 NOTE — PROGRESS NOTES
Julia Perla am scribing for and in the presence of Richard Cleary MD, F.A.C.C. Patient: Vianca Arellano  : 1955  Date of Visit: 2020    REASON FOR VISIT / CONSULTATION: Follow-up (Hx: syncope, pericarditis, HTN, depression, migraines. Pt is here for an abn stress. Pt is feeling okay. Having CP more than usual, stabby, radiating to back, lasting couple seconds and going away. left arm weakness. She does have SOB. she does have mild dizziness. she does have heart racing. A few weeks ago she was seeing red dots, lasted 30 seconds and would go away. couples hours later they would come back. Happened off and on for days.)      History of Present Illness:        Dear No primary care provider on file. I had the pleasure of seeing  Vianca Arellano in my office today. Ms. Dread Roper is a 63 y.o. female with a history of syncope on 2020. She was sent to [facility] for further evaluation. Her history of loss of consciousness is so complicated and there is marked discrepancy between the story she was telling us and the documented discharge summary from [facility]. Patient has history of hypertension and occasional migraine headaches. Used to be on treatment but she stopped taking medicines for about 2 years. Said she lost her cardiologist and she was taking care of her  who  last year. She was in the family gathering on 2020 and started having headache. The headache was severe. She took some sinus pill given by her sister's boyfriend and about an hour later she lost consciousness. There was some mention of approximately 7 minutes of tonic-clonic seizure like activities but patient said her other family members did not mention any seizure activity. Her brother started doing CPR on her. She was cyanotic and on arrival of the EMS she was given oxygen.   She started to wake up while in the ambulance not knowing anything of what has happened but mentioned that by the time she was awake she was aware of everything surrounding her. No post ictal confusion. She denies any palpitations or chest pain prior to the episode. She fell and fractured her right ribs in the back. She denied any tongue biting. She is unsure if she lost control in her urine. No loss of bowel continence. Work-up at [facility] included CT of the brain, MRI of the brain, EEG and CTA of the chest.  Nothing to explain her loss of consciousness the only positive finding mild urine test positive for opiates. Patient denied any narcotic use. Again she mentioned that she took that pill from her sister's boyfriend assuming this is a sinus medicine. She is an everyday smoker. Family history consist of her older brother having stents who is in his 66's. Her mother dies of a heart attack in her 52's. Also her other two brothers have heart issues. She liz having any history of diabetes or hyperlipidemia. She does have a history of pericarditis. She has never drank alcohol. She did have migraines in the past however since starting on her beta blocker she no longer does. Her   about a year ago and since she has not slept well. She is anxious but denied any suicidal ideation. She is interested in treatment for her anxiety. I told her we will set her up with Olya timmons to establish care with a primary care provider. She considers her self fairly active. She has gained weight. She used to weigh 170 and now she is now almost 200 lbs. Echo done on 2020: Normal ejection fraction and wall motion. Nothing to explain her syncope. Stress test done on 2020: Mildly abnormal myocardial perfusion study. There is a small/moderate perfusion defect of moderate intensity in the anterior and anteroseptal regions during stress imaging, which is most consistent with ischemia but may be due to artifact.  Global left ventricular systolic function was normal without regional wall motion abnormalities. No significant electrocardiographic evidence of myocardial ischemia during EKG monitoring without significant associated arrhythmias. The patient's Duke Treadmill score is 3, which correlates with an intermediate risk for significant coronary artery disease. Ms. Eliseo Olivo  Is here for a follow up. She says she is having chest pain that feels like a stabbing or poking. Episodes usually last for seconds or so. No significant radiation. Not associated with any sweating or diaphoresis. No relation to exertion. She reported occasional left arm pain and weakness with arm movements. Her breathing is good. She is very active with no significant exertional symptoms. No orthopnea or PND. She continues to have migraines off an on that are not getting any worse. No further syncopal episodes. PAST MEDICAL HISTORY:         Past Medical History:   Diagnosis Date    Abnormal stress test 07/23/2020    Abnormal study Small/mod perfusion defect of mod intensity in the anterior and anteroseptal regions during stress imaging which is most consistent with ischemia but maybe due to artifact Sharma score is 3    Asthma     GERD (gastroesophageal reflux disease)     H/O echocardiogram 06/23/2020    EF 55% Overall LV function is normal    Headache     Hypertension     Pneumonia        CURRENT ALLERGIES: Shellfish-derived products; Iodine; and Shellfish-derived products REVIEW OF SYSTEMS: 14 systems were reviewed. Pertinent positives and negatives as above, all else negative.      Past Surgical History:   Procedure Laterality Date    ANKLE SURGERY Left     ANKLE SURGERY      APPENDECTOMY      BREAST SURGERY  2016    [address]      for colitis and sepsis in 2003    EYE SURGERY Right     EYE SURGERY      FRACTURE SURGERY      Social History:  Social History     Tobacco Use    Smoking status: Current Every Day Smoker     Packs/day: 1.50    Smokeless tobacco: Never Used   Substance Use Topics    Alcohol use: No    Drug use: No        CURRENT MEDICATIONS:        Outpatient Medications Marked as Taking for the 8/13/20 encounter (Office Visit) with Indira Kevin MD   Medication Sig Dispense Refill    aspirin EC 81 MG EC tablet Take 1 tablet by mouth daily 30 tablet 3    atorvastatin (LIPITOR) 10 MG tablet Take 1 tablet by mouth daily 90 tablet 1    omeprazole (PRILOSEC) 40 MG delayed release capsule Take 1 capsule by mouth every morning (before breakfast) 90 capsule 3    metoprolol tartrate (LOPRESSOR) 25 MG tablet Take 1 tablet by mouth 2 times daily 60 tablet 3    topiramate (TOPAMAX) 25 MG tablet Take 1 tablet by mouth nightly 30 tablet 3       FAMILY HISTORY: family history includes Cancer (age of onset: 70) in her father; Diabetes in her mother; Heart Disease in her mother; Other in her mother. Physical Examination:     BP (!) 181/87 (Site: Right Upper Arm, Position: Sitting, Cuff Size: Large Adult)   Pulse 61   Resp 18   Ht 5' 6.5\" (1.689 m)   Wt 198 lb 9.6 oz (90.1 kg)   LMP  (LMP Unknown)   SpO2 100%   BMI 31.58 kg/m²  Body mass index is 31.58 kg/m². Constitutional: She appeared oriented to person and place. She appears well-developed and well-nourished. In no acute distress. HEENT: Normocephalic and atraumatic. No JVD present. Carotid bruit is not present. No mass and no thyromegaly present. No lymphadenopathy noted. Cardiovascular: Normal rate, regular rhythm, normal heart sounds. Exam reveals no gallop and no friction rubs. No murmur was heard. Pulmonary/Chest: Effort normal and breath sounds normal. No respiratory distress. She has no wheezes, rhonchi or rales. Abdominal: Soft, non-tender. She exhibits no organomegaly, mass or bruit. Extremities: No significant edema. No cyanosis or clubbing. 2+ radial and carotid pulses. Distal extremity pulses: 2+ bilaterally.   Neurological: Alertness and orientation as per Constitutional exam. No evidence of gross cranial nerve deficit. Coordination appeared normal.   Skin: Skin is warm and dry. There is no rash or diaphoresis. Psychiatric: She has a normal mood and affect. Her speech is normal and behavior is normal.      MOST RECENT LABS ON RECORD:   Lab Results   Component Value Date    WBC 8.4 06/23/2020    HGB 11.4 (L) 06/23/2020    HCT 35.4 (L) 06/23/2020     06/23/2020    CHOL 160 08/02/2015    TRIG 84 08/02/2015    HDL 31 08/02/2015    ALT 17 06/21/2020    AST 22 06/21/2020     (L) 06/23/2020    K 4.2 06/23/2020     06/23/2020    CREATININE 0.7 06/23/2020    BUN 20 06/23/2020    CO2 23 06/23/2020    TSH 1.510 08/03/2015    INR 1.0 06/21/2020       ASSESSMENT:     1. Abnormal stress test    2. Syncope, unspecified syncope type    3. Pericarditis, unspecified chronicity, unspecified type    4. Essential hypertension    5. Tobacco abuse       PLAN:       Abnormal Stress Test: Mildly abnormal stress test as outlined in HPI.  We discussed in details the stress test result with her. This is a low to intermediate risk stress test.  Her chest pain is atypical.   We discussed medical therapy but I told her that based on her symptoms, risk factors and stress test result we need to rule out significant CAD either by CTA or by cardiac catheterization. I expressed my concern about the accuracy of the CTA of the coronary arteries and I also told her that if the CTA of the coronary arteries came back abnormal we can end up doing cardiac catheterization anyway.  For these reasons, I recommended that the patient consider undergoing a cardiac catheterization with coronary angiography to assess their coronary anatomy and facilitate better treatment recommendations.  I discussed the risks, benefits, and alternatives to the procedure including the risk of bleeding, contrast induced allergy and/or kidney damage, arrythmia, stroke, heart attack, death, or the need for further

## 2021-11-16 ENCOUNTER — HOSPITAL ENCOUNTER (EMERGENCY)
Age: 66
Discharge: HOME | End: 2021-11-16
Attending: EMERGENCY MEDICINE
Payer: MEDICAID

## 2021-11-16 VITALS
BODY MASS INDEX: 29.03 KG/M2 | HEART RATE: 92 BPM | TEMPERATURE: 97.2 F | OXYGEN SATURATION: 97 % | WEIGHT: 185 LBS | RESPIRATION RATE: 18 BRPM | HEIGHT: 67 IN

## 2021-11-16 DIAGNOSIS — K08.89 PAIN, DENTAL: ICD-10-CM

## 2021-11-16 DIAGNOSIS — K02.9 DENTAL DECAY: Primary | ICD-10-CM

## 2021-11-16 PROCEDURE — 99283 EMERGENCY DEPT VISIT LOW MDM: CPT

## 2021-11-16 PROCEDURE — 6370000000 HC RX 637 (ALT 250 FOR IP): Performed by: EMERGENCY MEDICINE

## 2021-11-16 RX ORDER — PENICILLIN V POTASSIUM 500 MG/1
500 TABLET ORAL 3 TIMES DAILY
Qty: 30 TABLET | Refills: 0 | Status: SHIPPED | OUTPATIENT
Start: 2021-11-16 | End: 2021-11-26

## 2021-11-16 RX ORDER — PENICILLIN V POTASSIUM 250 MG/1
500 TABLET ORAL ONCE
Status: COMPLETED | OUTPATIENT
Start: 2021-11-16 | End: 2021-11-16

## 2021-11-16 RX ORDER — LIDOCAINE HYDROCHLORIDE 20 MG/ML
15 SOLUTION OROPHARYNGEAL
Status: DISCONTINUED | OUTPATIENT
Start: 2021-11-16 | End: 2021-11-16 | Stop reason: HOSPADM

## 2021-11-16 RX ADMIN — PENICILLIN V POTASIUM 500 MG: 250 TABLET ORAL at 19:15

## 2021-11-16 RX ADMIN — Medication: at 19:15

## 2021-11-16 RX ADMIN — BENZOCAINE, BUTAMBEN, AND TETRACAINE HYDROCHLORIDE 1 SPRAY: .028; .004; .004 AEROSOL, SPRAY TOPICAL at 19:15

## 2021-11-16 ASSESSMENT — PAIN DESCRIPTION - PAIN TYPE: TYPE: ACUTE PAIN

## 2021-11-16 ASSESSMENT — PAIN DESCRIPTION - LOCATION: LOCATION: TEETH;JAW

## 2021-11-16 ASSESSMENT — PAIN DESCRIPTION - DESCRIPTORS: DESCRIPTORS: SORE;TENDER

## 2021-11-16 ASSESSMENT — PAIN SCALES - GENERAL: PAINLEVEL_OUTOF10: 10

## 2021-11-16 ASSESSMENT — PAIN DESCRIPTION - ORIENTATION: ORIENTATION: RIGHT;LOWER

## 2021-11-16 NOTE — ED PROVIDER NOTES
[facility] ED  EMERGENCY DEPARTMENT ENCOUNTER      Pt Name: Rosalie Rubio  MRN: [id number]  [place] 1955  Date of evaluation: 11/16/2021  Provider: Pat Halsted, MD    [address] [place] [address]       Chief Complaint   Patient presents with    Dental Pain     RIght lower, onset 5 days ago with swelling         HISTORY OF PRESENT ILLNESS   (Location/Symptom, Timing/Onset, Context/Setting, Quality, Duration, Modifying Factors, Severity)  Note limiting factors. Rosalie Rubio is a 61 y.o. female who presents to the emergency department      46-year-old female presented emergency department for evaluation of dental pain for the past few weeks. States that her tooth had problems with several months ago but she had not followed up with a dentist at that time. Over the past few weeks the pain has intensified. Has been using over-the-counter ibuprofen with no relief. She does have an appointment scheduled with her dentist but they were unable to get her in for 2 weeks. She does not have any fevers or chills. She does complain of worsening pain or swelling. No difficulty swallowing. No other acute concerns. Nursing Notes were reviewed. REVIEW OF SYSTEMS    (2-9 systems for level 4, 10 or more for level 5)     Review of Systems   All other systems reviewed and are negative. Except as noted above the remainder of the review of systems was reviewed and negative.        PAST MEDICAL HISTORY     Past Medical History:   Diagnosis Date    Abnormal stress test 07/23/2020    Abnormal study Small/mod perfusion defect of mod intensity in the anterior and anteroseptal regions during stress imaging which is most consistent with ischemia but maybe due to artifact Sharma score is 3    Asthma     GERD (gastroesophageal reflux disease)     H/O echocardiogram 06/23/2020    EF 55% Overall LV function is normal    Headache     Hypertension     Pneumonia          SURGICAL HISTORY       Past Surgical History:   Procedure Laterality Date    ANKLE SURGERY Left     ANKLE SURGERY      APPENDECTOMY      BREAST SURGERY  2016    [address]      for colitis and sepsis in 2003    EYE SURGERY Right     EYE SURGERY      FRACTURE SURGERY           CURRENT MEDICATIONS       Previous Medications    ASPIRIN EC 81 MG EC TABLET    Take 1 tablet by mouth daily    ATORVASTATIN (LIPITOR) 10 MG TABLET    Take 1 tablet by mouth daily    METOPROLOL TARTRATE (LOPRESSOR) 25 MG TABLET    Take 1 tablet by mouth 2 times daily    OMEPRAZOLE (PRILOSEC) 40 MG DELAYED RELEASE CAPSULE    Take 1 capsule by mouth every morning (before breakfast)    TOPIRAMATE (TOPAMAX) 25 MG TABLET    Take 1 tablet by mouth nightly       ALLERGIES     Shellfish-derived products, Iodine, and Shellfish-derived products    FAMILY HISTORY       Family History   Problem Relation Age of Onset    Cancer Father 70        stomach     Heart Disease Mother     Diabetes Mother     Other Mother         Heart Attack          SOCIAL HISTORY       Social History     Socioeconomic History    Marital status: Single     Spouse name: Not on file    Number of children: Not on file    Years of education: Not on file    Highest education level: Not on file   Occupational History    Not on file   Tobacco Use    Smoking status: Current Every Day Smoker     Packs/day: 1.50    Smokeless tobacco: Never Used   Vaping Use    Vaping Use: Never used   Substance and Sexual Activity    Alcohol use: No    Drug use: No    Sexual activity: Not on file   Other Topics Concern    Not on file   Social History Narrative    ** Merged History Encounter **          Social Determinants of Health     Financial Resource Strain:     Difficulty of Paying Living Expenses: Not on file   Food Insecurity:     Worried About Running Out of Food in the Last Year: Not on file    Juma of Food in the Last Year: Not on file   Transportation Needs:     Lack of Transportation (Medical): Not on file    Lack of Transportation (Non-Medical): Not on file   Physical Activity:     Days of Exercise per Week: Not on file    Minutes of Exercise per Session: Not on file   Stress:     Feeling of Stress : Not on file   Social Connections:     Frequency of Communication with Friends and Family: Not on file    Frequency of Social Gatherings with Friends and Family: Not on file    Attends Worship Services: Not on file    Active Member of [address] [place] [address] or Organizations: Not on file    Attends Club or Organization Meetings: Not on file    Marital Status: Not on file   Intimate Partner Violence:     Fear of Current or Ex-Partner: Not on file    Emotionally Abused: Not on file    Physically Abused: Not on file    Sexually Abused: Not on file   Housing Stability:     Unable to Pay for Housing in the Last Year: Not on file    Number of [place] in the Last Year: Not on file    Unstable Housing in the Last Year: Not on file       SCREENINGS                        PHYSICAL EXAM    (up to 7 for level 4, 8 or more for level 5)     ED Triage Vitals [11/16/21 1552]   BP Temp Temp Source Pulse Resp SpO2 Height Weight   -- 97.2 °F (36.2 °C) Tympanic 92 18 97 % 5' 7\" (1.702 m) 185 lb (83.9 kg)       Physical Exam  Vitals and nursing note reviewed. Constitutional:       Comments: Patient is uncomfortable and tearful but nontoxic and appears in no acute distress   HENT:      Head: Normocephalic and atraumatic. Mouth/Throat:      Comments: Right lower molar extensive decay. No abscess. No gingival inflammation. Skin:     General: Skin is warm and dry. Neurological:      Mental Status: She is alert and oriented to person, place, and time.          DIAGNOSTIC RESULTS     EKG: All EKG's are interpreted by the Emergency Department Physician who either signs or Co-signs this chart in the absence of a cardiologist.        RADIOLOGY:   Non-plain film images such as CT, Ultrasound and MRI are read by the radiologist. Plain radiographic images are visualized and preliminarily interpreted by the emergency physician with the below findings:        Interpretation per the Radiologist below, if available at the time of this note:    No orders to display         ED BEDSIDE ULTRASOUND:   Performed by ED Physician - none    LABS:  Labs Reviewed - No data to display    All other labs were within normal range or not returned as of this dictation. EMERGENCY DEPARTMENT COURSE and DIFFERENTIAL DIAGNOSIS/MDM:   Vitals:    Vitals:    11/16/21 1552   Pulse: 92   Resp: 18   Temp: 97.2 °F (36.2 °C)   TempSrc: Tympanic   SpO2: 97%   Weight: 185 lb (83.9 kg)   Height: 5' 7\" (1.702 m)           MDM  Number of Diagnoses or Management Options  Dental decay  Pain, dental  Diagnosis management comments: Treatment plan ED return and follow-up instructions discussed. MIPS     REASSESSMENT          CRITICAL CARE TIME   Total Critical Care time was  minutes, excluding separately reportable procedures. There was a high probability of clinically significant/life threatening deterioration in the patient's condition which required my urgent intervention. CONSULTS:  None    PROCEDURES:  Unless otherwise noted below, none     Procedures        FINAL IMPRESSION      1. Dental decay    2. Pain, dental          DISPOSITION/PLAN   DISPOSITION Discharge - Pending Orders Complete 11/16/2021 07:05:45 PM      PATIENT REFERRED TO:    Follow-up with your dentist as soon as possible. DISCHARGE MEDICATIONS:  New Prescriptions    PENICILLIN V POTASSIUM (VEETID) 500 MG TABLET    Take 1 tablet by mouth 3 times daily for 10 days     Controlled Substances Monitoring:     No flowsheet data found.     (Please note that portions of this note were completed with a voice recognition program.  Efforts were made to edit the dictations but occasionally words are mis-transcribed.)    Linda Sellers MD (electronically signed)  Attending Emergency Physician            Elpidio Calderon MD  11/16/21 1910

## 2023-06-19 ENCOUNTER — HOSPITAL ENCOUNTER
Age: 68
End: 2023-06-19
Payer: MEDICARE

## 2023-06-19 DIAGNOSIS — F17.210: Primary | ICD-10-CM

## 2023-06-19 PROCEDURE — 71271 CT THORAX LUNG CANCER SCR C-: CPT

## 2023-06-19 NOTE — CT_ITS
[address] [address] 
     [phone] 
  
  
Patient Name: 
JUNIOR FARLEY 
  
MRN: [id number]    YOB: 1955    Sex: F 
Assigned Patient Location: CT 
Current Patient Location: CT 
Accession/Order Number: [id number] 
Exam Date: 6/19/2023  12:51    Report Date: 6/20/2023  07:15 
  
At the request of: 
DIVYA MARTÍNEZ   
  
Procedure:  CT lung screening low-dose 
  
EXAMINATION: CT lung screening low-dose  
  
HISTORY: Nicotine dependence F17.210 
  
COMPARISON: No relevant comparison available.  
  
TECHNIQUE: Axial, Coronal, and Sagittal images were created without the  
administration of IV contrast material. Dose reduction techniques were  
achieved  
by using automated exposure control and/or adjustment of mA and/or kV  
according  
to patient size and/or use of iterative reconstruction technique. 
  
FINDINGS: 
LUNGS: Mild emphysematous changes bilaterally, and borderline mild  
bronchiectasis within lower lobes. 2 adjacent 3 mm nodules within right lower  
lobe superior segment. 
PLEURA: No mass, effusion, or pneumothorax. 
VASCULATURE: No abnormality. 
ANDREIA: Calcified right hilar lymph nodes compatible with chronic granulomatous  
disease. 
MEDIASTINUM: Calcified lymph nodes. 
CARDIAC: No enlargement, pericardial thickening, or significant calcification. 
AORTA: No aneurysm or dissection. 
CHEST WALL: No mass or axillary adenopathy 
BONES: No bone lesion or fracture. 
LIMITED ABDOMEN: No suspicious findings. Limited images of the upper abdomen. 
OTHER: Negative. 
  
IMPRESSION:  
  
1. Lung-RADS 2- Benign Appearance or Behavior. Nodules with a very low  
likelihood of becoming a clinically active cancer due to size or lack of  
growth. Follow-up CT Chest in 1 year. 
  
  
Electronically authenticated by: JENNIFER DELANEY   Date: 6/20/2023  07:15

## 2024-03-29 ENCOUNTER — HOSPITAL ENCOUNTER
Dept: HOSPITAL 101 - LAB | Age: 69
Discharge: HOME | End: 2024-03-29
Payer: MEDICARE

## 2024-03-29 DIAGNOSIS — Z87.820: ICD-10-CM

## 2024-03-29 DIAGNOSIS — I10: ICD-10-CM

## 2024-03-29 DIAGNOSIS — G44.329: Primary | ICD-10-CM

## 2024-03-29 LAB
ANION GAP: 13.4
BLOOD UREA NITROGEN: 22 MG/DL (ref 7–18)
CALCIUM: 8.6 MG/DL (ref 8.5–10.1)
CARBON DIOXIDE: 26.6 MMOL/L (ref 21–32)
CHLORIDE: 99 MMOL/L (ref 98–107)
ESTIMATED GFR (AFRICAN AMERICA: 24 (ref 60–?)
ESTIMATED GFR (NON-AFRICAN AME: 20 (ref 60–?)
GLUCOSE: 115 MG/DL (ref 74–106)
POTASSIUM: 4 MMOL/L (ref 3.5–5.1)
SODIUM: 135 MMOL/L (ref 136–145)

## 2024-03-29 PROCEDURE — 36415 COLL VENOUS BLD VENIPUNCTURE: CPT

## 2024-03-29 PROCEDURE — 70551 MRI BRAIN STEM W/O DYE: CPT

## 2024-03-29 PROCEDURE — 80048 BASIC METABOLIC PNL TOTAL CA: CPT

## 2024-03-29 NOTE — MR_ITS
The [address] [address] 
     [phone] 
  
  
Patient Name: 
JUNIOR FARLEY 
  
MRN: [id number]    YOB: 1955    Sex: F 
Assigned Patient Location: LAB 
Current Patient Location:   
Accession/Order Number: [id number] 
Exam Date: 3/29/2024  10:30    Report Date: 3/30/2024  05:16 
  
At the request of: 
DIVYA MARTÍNEZ   
  
Procedure:  MR head/brain wo con 
  
EXAMINATION: MR head/brain wo con  
  
HISTORY: Chronic Post Traumatic Headache G44.239 
  
COMPARISON: No relevant comparison available.  
  
TECHNIQUE: A variety of imaging planes and parameters were utilized for  
visualization of suspected pathology. Images were performed without contrast. 
  
FINDINGS: 
CEREBRUM: Several small T2 hyperintensities scattered within the deep white  
matter bilaterally. No edema, hemorrhage, mass, acute infarction, or  
inappropriate atrophy.  
CEREBELLUM: No edema, hemorrhage, mass, acute infarction, or inappropriate  
atrophy.  
BRAINSTEM: No edema, hemorrhage, mass, acute infarction, or inappropriate  
atrophy.  
CSF SPACES: Ventricles, cisterns, and sulci are appropriate for age. No  
hydrocephalus, subarachnoid hemorrhage, or mass.  
SKULL: No mass or other significant visible lesion.  
SINUSES: Limited views demonstrate no significant mucosal thickening or fluid.  
  
ORBITS: Limited views are unremarkable.  
OTHER: Negative.  
  
ORDER #: [id number] MR/MR head/brain wo con  
IMPRESSION:   
   
1. Scattered tiny T2 hyperintensities within the deep white matter;   
nonspecific   
but most suggestive of chronic small vessel ischemic changes. Sequela of   
posttraumatic injury is not excluded.  
2. Otherwise unremarkable MRI of the brain.  
   
   
Electronically authenticated by: JENNIFER DELANEY   Date: 3/30/2024  05:16

## 2024-10-26 ENCOUNTER — HOSPITAL ENCOUNTER (EMERGENCY)
Age: 69
Discharge: HOME | End: 2024-10-26
Payer: MEDICARE

## 2024-10-26 VITALS — DIASTOLIC BLOOD PRESSURE: 79 MMHG | HEART RATE: 69 BPM | SYSTOLIC BLOOD PRESSURE: 124 MMHG | OXYGEN SATURATION: 98 %

## 2024-10-26 VITALS — BODY MASS INDEX: 28.2 KG/M2

## 2024-10-26 VITALS — OXYGEN SATURATION: 100 % | SYSTOLIC BLOOD PRESSURE: 148 MMHG | TEMPERATURE: 97.8 F | DIASTOLIC BLOOD PRESSURE: 99 MMHG

## 2024-10-26 VITALS — HEART RATE: 74 BPM

## 2024-10-26 DIAGNOSIS — R00.2: ICD-10-CM

## 2024-10-26 DIAGNOSIS — R07.9: Primary | ICD-10-CM

## 2024-10-26 LAB
ADD MANUAL DIFF: NO
ANION GAP: 14.8
BLOOD UREA NITROGEN: 20 MG/DL (ref 7–18)
CALCIUM: 9.5 MG/DL (ref 8.5–10.1)
CARBON DIOXIDE: 24.9 MMOL/L (ref 21–32)
CHLORIDE: 102 MMOL/L (ref 98–107)
CO2 BLD-SCNC: 24.9 MMOL/L (ref 21–32)
ESTIMATED GFR (AFRICAN AMERICA: 40
ESTIMATED GFR (NON-AFRICAN AME: 33
GLUCOSE BLD-MCNC: 96 MG/DL (ref 74–106)
HCT VFR BLD CALC: 41.5 % (ref 36–48)
HEMATOCRIT: 41.5 % (ref 36–48)
HEMOGLOBIN: 13.7 G/DL (ref 12–16)
IMMATURE GRANULOCYTES ABS AUTO: 0.01 10^3/UL (ref 0–0.03)
IMMATURE GRANULOCYTES PCT AUTO: 0.2 % (ref 0–0.5)
LYMPHOCYTES  ABSOLUTE AUTO: 2 10^3/UL (ref 1.2–3.8)
MCV RBC: 87.4 FL (ref 81–99)
MEAN CORPUSCULAR HEMOGLOBIN: 28.8 PG (ref 26.7–34)
MEAN CORPUSCULAR HGB CONC: 33 G/DL (ref 29.9–35.2)
MEAN CORPUSCULAR VOLUME: 87.4 FL (ref 81–99)
PLATELET # BLD: 210 10^3/UL (ref 150–450)
PLATELET COUNT: 210 10^3/UL (ref 150–450)
POTASSIUM SERPLBLD-SCNC: 3.7 MMOL/L (ref 3.5–5.1)
POTASSIUM: 3.7 MMOL/L (ref 3.5–5.1)
RED BLOOD COUNT: 4.75 10^6/UL (ref 4.2–5.4)
SODIUM BLD-SCNC: 138 MMOL/L (ref 136–145)
SODIUM: 138 MMOL/L (ref 136–145)
WBC # BLD: 6.1 10^3/UL (ref 4–11)
WHITE BLOOD COUNT: 6.1 10^3/UL (ref 4–11)

## 2024-10-26 PROCEDURE — 99284 EMERGENCY DEPT VISIT MOD MDM: CPT

## 2024-10-26 PROCEDURE — 80048 BASIC METABOLIC PNL TOTAL CA: CPT

## 2024-10-26 PROCEDURE — 85378 FIBRIN DEGRADE SEMIQUANT: CPT

## 2024-10-26 PROCEDURE — 36415 COLL VENOUS BLD VENIPUNCTURE: CPT

## 2024-10-26 PROCEDURE — 71045 X-RAY EXAM CHEST 1 VIEW: CPT

## 2024-10-26 PROCEDURE — 85025 COMPLETE CBC W/AUTO DIFF WBC: CPT

## 2024-10-26 PROCEDURE — 84484 ASSAY OF TROPONIN QUANT: CPT

## 2024-10-26 PROCEDURE — 93005 ELECTROCARDIOGRAM TRACING: CPT

## 2024-10-26 NOTE — ED_ITS
HPI - Chest Pain    
General    
Chief Complaint: Chest Pain    
Stated Complaint: Fast Heart Rate    
Time Seen by Provider: 10/26/24 05:58    
Source: patient    
Mode of arrival: walk-in    
Limitations: no limitations    
History of Present Illness    
HPI narrative:     
heart racing . woke her up. lasted until she got here. [place] like she was going   
to die because of her heart racing. Has a rash on and off for the past week she   
has been treating with hydroxyzine. Also nausea and diarrhea. No dyspnea. States  
Dr larson has her set up for a stress test and she has an appointment with him   
in  a couple of days    
Related Data    
                                Home Medications    
    
    
    
?Medication ?Instructions ?Recorded ?Confirmed    
     
albuterol 90 mcg/actuation aerosol 90 mcg inhalation QID PRN 10/26/24 10/26/24    
    
inhaler shortness of breath or wheezing      
     
hydroxyzine HCl 25 mg tablet 25 mg PO Q6H PRN anxiety 10/26/24 10/26/24    
     
lisinopril 20 1 tab PO DAILY 10/26/24 10/26/24    
    
mg-hydrochlorothiazide 25 mg tablet       
     
tramadol 50 mg tablet 50 mg PO DAILY pain 10/26/24 10/26/24    
    
    
    
                                    Allergies    
    
    
    
Allergy/AdvReac Type Severity Reaction Status Date / Time    
     
shellfish derived Allergy  Hives Verified 10/26/24 05:27    
    
    
    
    
Review of Systems    
    
    
ROS      
    
 Status of ROS                          10 or more systems reviewed and unremark    
able except as noted in     
history and below       
    
    
PFSH    
PFSH    
Social History    
Little interest or pleasure in doing things:  not at all     
Feeling down, depressed, or hopeless:  not at all     
    
    
    
Exam    
Constitutional    
Vital Signs, click to edit/add:     
    
                                Last Vital Signs    
    
    
    
Temp  97.8 F   10/26/24 05:19    
     
Pulse  69   10/26/24 06:35    
     
Resp  14   10/26/24 06:35    
     
BP  124/79   10/26/24 06:35    
     
Pulse Ox  98   10/26/24 06:35    
     
O2 Del Method  Room Air  10/26/24 06:35    
    
    
    
    
Common normals: no apparent distress, average body habitus, oriented x3, no dinero  
itations, healthy appearing, alert and well nourished    
[facility]    
Common normals: normocephalic and head/scalp atraumatic    
Eye    
Common normals: EOMs intact bilaterally and conjunctivae normal    
Other:     
deformity right pupil    
Respiratory    
Common normals: normal respiratory effort, no retractions, no use of accessory   
muscles and clear to auscultation bilaterally    
Cardio    
Common normals: regular rate, regular rhythm, S1 normal heart sound and S2   
normal heart sound    
GI    
Common normals: Normal to inspection, nondistended, normoactive bowel sounds   
present, soft to palpation and non-tender    
Extremity    
Common normals: normal to inspection and full ROM    
Neuro    
Common normals: oriented x3, CN's II-XII intact bilaterally, moves all   
extremities and no focal motor deficits    
Psych    
Appearance: grossly normal    
    
Course    
Vital Signs    
Vital signs:     
    
                                   Vital Signs    
    
    
    
Temperature  97.8 F   10/26/24 05:19    
     
Respiratory Rate  20   10/26/24 05:19    
     
Blood Pressure  148/99 H  10/26/24 05:19    
     
Pulse Oximetry  100   10/26/24 05:19    
     
Oxygen Delivery Method  Room Air  10/26/24 05:19    
    
    
                                            
    
    
    
Temperature  97.8 F   10/26/24 05:19    
     
Pulse Rate  69   10/26/24 06:35    
     
Respiratory Rate  14   10/26/24 06:35    
     
Blood Pressure  124/79   10/26/24 06:35    
     
Pulse Oximetry  98   10/26/24 06:35    
     
Oxygen Delivery Method  Room Air  10/26/24 06:35    
    
    
    
    
    
MDM - Chest Pain    
MDM Narrative    
Medical decision making narrative:     
patient presents with history of palpitations. States she was asleep and woke up  
to her heart racing. No chest pain. States she has some nausea but this has been  
for a couple of days along with diarrhea. None now. No dyspnea. Additionally   
describes breaking out in a rash for which she is prescribed hydroxyzine and   
took that before coming in.  Only sparse rash lesions noted. No dyspnea or light  
headiness. On arrival here EKG NSR. no a changes. workup in the department is   
normal. Discussed patient staying for a 2nd troponin but she did not want to   
stay. She is scheduled for an appointment  with her PCP in 2 days and has also   
been scheduled for a stress test. Advised to keep both appointments. she is to   
return if symptoms recur. She admits similar symptoms of heart racing on and off  
for years. She states no one has to date identified what the  racing heart    
rhythm is     
Lab Data    
Labs:     
    
                                   Lab Results    
    
    
    
  10/26/24 Range/Units    
    
  06:00     
     
WBC  6.1  (4.0-11.0)  10^3/uL    
     
RBC  4.75  (4.20-5.40)  10^6/uL    
     
Hgb  13.7  (12.0-16.0)  g/dL    
     
Hct  41.5  (36.0-48.0)  %    
     
MCV  87.4  (81.0-99.0)  fL    
     
MCH  28.8  (26.7-34.0)  pg    
     
MCHC  33.0  (29.9-35.2)  g/dL    
     
RDW  13.2  (11.0-15.0)  %    
     
Plt Count  210  (150-450)  10^3/uL    
     
MPV  11.0  (9.5-13.5)  fL    
     
Neut % (Auto)  57.7  (43.0-75.0)  %    
     
Lymph % (Auto)  33.0  (20.5-60.0)  %    
     
Mono % (Auto)  5.0  (1.7-12.0)  %    
     
Eos % (Auto)  3.6  (0.9-7.0)  %    
     
Baso % (Auto)  0.5  (0.2-2.0)  %    
     
Neut # (Auto)  3.5  (1.4-6.5)  10^3/uL    
     
Lymph # (Auto)  2.0  (1.2-3.8)  10^3/uL    
     
Mono # (Auto)  0.3  (0.3-0.8)  10^3/uL    
     
Eos # (Auto)  0.2  (0.0-0.7)  10^3/uL    
     
Baso # (Auto)  0.0  (0.0-0.1)  10^3/uL    
     
Abs Immat Gran (auto)  0.01  (0.00-0.03)  10^3/uL    
     
Imm/Tot Granulo (auto)  0.2  (0.0-0.5)  %    
     
D-Dimer  0.42  (<=0.59)  mg/L FEU    
     
Sodium  138  (136-145)  mmol/L    
     
Potassium  3.7  (3.5-5.1)  mmol/L    
     
Chloride  102  ()  mmol/L    
     
Carbon Dioxide  24.9  (21.0-32.0)  mmol/L    
     
Anion Gap  14.8      
     
BUN  20.0 H  (7.0-18.0)  mg/dL    
     
Creatinine  1.56 H  (0.55-1.02)  mg/dL    
     
Est GFR ( Amer)  40 L  (>=60 mL/min/1.73m^2)      
     
Est GFR (Non-Af Amer)  33 L  (>=60 mL/min/1.73m^2)      
     
BUN/Creatinine Ratio  12.8      
     
Glucose  96  ()  mg/dL    
     
Calcium  9.5  (8.5-10.1)  mg/dL    
     
Troponin I High Sens  6.3  (4.0-51.3)  pg/mL    
    
    
    
    
    
Discharge Plan    
Discharge    
Chief Complaint: Chest Pain    
    
Clinical Impression:    
 Chest pain, Palpitations    
    
    
Patient Disposition: Home, Self-Care    
    
Prescriptions / Home Meds:    
No Action    
  hydroxyzine HCl 25 mg tablet     
   25 mg PO Q6H PRN (Reason: anxiety)     
  lisinopril-hydrochlorothiazide 20-25 mg tablet     
   1 tab PO DAILY     
  tramadol 50 mg tablet     
   50 mg PO DAILY     
  albuterol 90 mcg/actuation aerosol     
   90 mcg inhalation QID PRN (Reason: shortness of breath or wheezing)     
    
Print Language: English    
    
Instructions:  Chest Pain (ED), Heart Palpitations (ED)    
    
Additional Instructions:    
follow up with Dr Larson and keep appointment for stress testing    
    
Referrals:    
Alistair Larson MD [Primary Care Provider] - 1 week

## 2024-10-26 NOTE — ECG_ITS
[facility] 
                                        
                                       Test Date:    2024-10-26 
Pat Name:     JUNIOR FARLEY           Department:    
Patient ID:   [id number]               Room:         - 
Gender:       Female                   Technician:    
:          1955               Requested By: DIVYA MARTÍNEZ 
Order Number: [id number]              Reading MD:   MARYLU WHITAKER 
                                 Measurements 
Intervals                              Axis           
Rate:         74                       P:            26 
WY:           178                      QRS:          39 
QRSD:         86                       T:            39 
QT:           380                                     
QTc:          408                                     
                           Interpretive Statements 
1100 Sinus rhythm 
2420 RSR (QR) in lead V1/V2, consistent with right ventricular conduction 
delay 
8102 Low QRS voltage in chest leads 
9130 **  borderline ECG  ** 
Compared to ECG 2023 19:03:34 
Low QRS voltage now present 
Electronically Signed On 10- 7:42:40 EDT by MARYLU WHITAKER

## 2024-10-26 NOTE — XR_ITS
The [address] [address] 
     [phone] 
  
  
Patient Name: 
JUNIOR FARLEY 
  
MRN: [id number]    YOB: 1955    Sex: F 
Assigned Patient Location: ER 
Current Patient Location: ER 
Accession/Order Number: [id number] 
Exam Date: 10/26/2024  06:15    Report Date: 10/26/2024  06:32 
  
At the request of: 
DAY GALAN   
  
Procedure:  XR chest 1V 
  
EXAMINATION: XR chest 1V  
  
HISTORY: chest pain 
  
COMPARISON: XR chest 3/4/2023 
  
FINDINGS: 
LUNGS: No significant pulmonary parenchymal abnormalities. 
VASCULATURE: No increased pulmonary vasculature.  
PLEURA: No pneumothorax, effusion, or pleural thickening.  
CARDIAC: No cardiomegaly or cardiac silhouette abnormality.  
MEDIASTINUM: No visible mass or adenopathy.  
BONES: No fracture or visible bone lesion.  
OTHER: Negative.  
  
ORDER #: [id number] XR/XR chest 1V  
IMPRESSION:   
   
1. No acute cardiopulmonary process.  
   
   
Electronically authenticated by: JENNIFER DELANEY   Date: 10/26/2024  06:32

## 2024-10-26 NOTE — ED.CHESTPAI1
HPI - Chest Pain
General
Chief Complaint: Chest Pain
Stated Complaint: Fast Heart Rate
Time Seen by Provider: 10/26/24 05:58
Source: patient
Mode of arrival: walk-in
Limitations: no limitations
History of Present Illness
HPI narrative: 
heart racing . woke her up. lasted until she got here. [place] like she was going to die because of her heart racing. Has a rash on and off for the past week she has been treating with hydroxyzine. Also nausea and diarrhea. No dyspnea. States Dr larson has her set up for a stress test and she has an appointment with him in  a couple of days
Related Data
Home Medications

?Medication ?Instructions ?Recorded ?Confirmed
albuterol 90 mcg/actuation aerosol 90 mcg inhalation QID PRN 10/26/24 10/26/24
inhaler shortness of breath or wheezing  
hydroxyzine HCl 25 mg tablet 25 mg PO Q6H PRN anxiety 10/26/24 10/26/24
lisinopril 20 1 tab PO DAILY 10/26/24 10/26/24
mg-hydrochlorothiazide 25 mg tablet   
tramadol 50 mg tablet 50 mg PO DAILY pain 10/26/24 10/26/24


Allergies

Allergy/AdvReac Type Severity Reaction Status Date / Time
shellfish derived Allergy  Hives Verified 10/26/24 05:27



Review of Systems
ROS  
 Status of ROS 10 or more systems reviewed and unremarkable except as noted in history and below   

PFSH
PFSH
Social History
Little interest or pleasure in doing things:  not at all 
Feeling down, depressed, or hopeless:  not at all 



Exam
Constitutional
Vital Signs, click to edit/add: 

Last Vital Signs

Temp  97.8 F   10/26/24 05:19
Pulse  69   10/26/24 06:35
Resp  14   10/26/24 06:35
BP  124/79   10/26/24 06:35
Pulse Ox  98   10/26/24 06:35
O2 Del Method  Room Air  10/26/24 06:35



Common normals: no apparent distress, average body habitus, oriented x3, no limitations, healthy appearing, alert and well nourished
[facility]
Common normals: normocephalic and head/scalp atraumatic
Eye
Common normals: EOMs intact bilaterally and conjunctivae normal
Other: 
deformity right pupil
Respiratory
Common normals: normal respiratory effort, no retractions, no use of accessory muscles and clear to auscultation bilaterally
Cardio
Common normals: regular rate, regular rhythm, S1 normal heart sound and S2 normal heart sound
GI
Common normals: Normal to inspection, nondistended, normoactive bowel sounds present, soft to palpation and non-tender
Extremity
Common normals: normal to inspection and full ROM
Neuro
Common normals: oriented x3, CN's II-XII intact bilaterally, moves all extremities and no focal motor deficits
Psych
Appearance: grossly normal

Course
Vital Signs
Vital signs: 

Vital Signs

Temperature  97.8 F   10/26/24 05:19
Respiratory Rate  20   10/26/24 05:19
Blood Pressure  148/99 H  10/26/24 05:19
Pulse Oximetry  100   10/26/24 05:19
Oxygen Delivery Method  Room Air  10/26/24 05:19



Temperature  97.8 F   10/26/24 05:19
Pulse Rate  69   10/26/24 06:35
Respiratory Rate  14   10/26/24 06:35
Blood Pressure  124/79   10/26/24 06:35
Pulse Oximetry  98   10/26/24 06:35
Oxygen Delivery Method  Room Air  10/26/24 06:35




MDM - Chest Pain
MDM Narrative
Medical decision making narrative: 
patient presents with history of palpitations. States she was asleep and woke up to her heart racing. No chest pain. States she has some nausea but this has been for a couple of days along with diarrhea. None now. No dyspnea. Additionally describes 
breaking out in a rash for which she is prescribed hydroxyzine and took that before coming in.  Only sparse rash lesions noted. No dyspnea or light headiness. On arrival here EKG NSR. no a changes. workup in the department is normal. Discussed 
patient staying for a 2nd troponin but she did not want to stay. She is scheduled for an appointment  with her PCP in 2 days and has also been scheduled for a stress test. Advised to keep both appointments. she is to return if symptoms recur. She 
admits similar symptoms of heart racing on and off for years. She states no one has to date identified what the  racing heart  rhythm is 
Lab Data
Labs: 

Lab Results

  10/26/24 Range/Units
  06:00 
WBC  6.1  (4.0-11.0)  10^3/uL
RBC  4.75  (4.20-5.40)  10^6/uL
Hgb  13.7  (12.0-16.0)  g/dL
Hct  41.5  (36.0-48.0)  %
MCV  87.4  (81.0-99.0)  fL
MCH  28.8  (26.7-34.0)  pg
MCHC  33.0  (29.9-35.2)  g/dL
RDW  13.2  (11.0-15.0)  %
Plt Count  210  (150-450)  10^3/uL
MPV  11.0  (9.5-13.5)  fL
Neut % (Auto)  57.7  (43.0-75.0)  %
Lymph % (Auto)  33.0  (20.5-60.0)  %
Mono % (Auto)  5.0  (1.7-12.0)  %
Eos % (Auto)  3.6  (0.9-7.0)  %
Baso % (Auto)  0.5  (0.2-2.0)  %
Neut # (Auto)  3.5  (1.4-6.5)  10^3/uL
Lymph # (Auto)  2.0  (1.2-3.8)  10^3/uL
Mono # (Auto)  0.3  (0.3-0.8)  10^3/uL
Eos # (Auto)  0.2  (0.0-0.7)  10^3/uL
Baso # (Auto)  0.0  (0.0-0.1)  10^3/uL
Abs Immat Gran (auto)  0.01  (0.00-0.03)  10^3/uL
Imm/Tot Granulo (auto)  0.2  (0.0-0.5)  %
D-Dimer  0.42  (<=0.59)  mg/L FEU
Sodium  138  (136-145)  mmol/L
Potassium  3.7  (3.5-5.1)  mmol/L
Chloride  102  ()  mmol/L
Carbon Dioxide  24.9  (21.0-32.0)  mmol/L
Anion Gap  14.8  
BUN  20.0 H  (7.0-18.0)  mg/dL
Creatinine  1.56 H  (0.55-1.02)  mg/dL
Est GFR ( Amer)  40 L  (>=60 mL/min/1.73m^2)  
Est GFR (Non-Af Amer)  33 L  (>=60 mL/min/1.73m^2)  
BUN/Creatinine Ratio  12.8  
Glucose  96  ()  mg/dL
Calcium  9.5  (8.5-10.1)  mg/dL
Troponin I High Sens  6.3  (4.0-51.3)  pg/mL




Discharge Plan
Discharge
Chief Complaint: Chest Pain

Clinical Impression:
 Chest pain, Palpitations


Patient Disposition: Home, Self-Care

Prescriptions / Home Meds:
No Action
  hydroxyzine HCl 25 mg tablet 
   25 mg PO Q6H PRN (Reason: anxiety) 
  lisinopril-hydrochlorothiazide 20-25 mg tablet 
   1 tab PO DAILY 
  tramadol 50 mg tablet 
   50 mg PO DAILY 
  albuterol 90 mcg/actuation aerosol 
   90 mcg inhalation QID PRN (Reason: shortness of breath or wheezing) 

Print Language: English

Instructions:  Chest Pain (ED), Heart Palpitations (ED)

Additional Instructions:
follow up with Dr Larson and keep appointment for stress testing

Referrals:
Alistair Larson MD [Primary Care Provider] - 1 week

## 2024-11-25 ENCOUNTER — HOSPITAL ENCOUNTER
Dept: HOSPITAL 101 - NM | Age: 69
Discharge: HOME | End: 2024-11-25
Payer: MEDICARE

## 2024-11-25 DIAGNOSIS — E78.5: ICD-10-CM

## 2024-11-25 DIAGNOSIS — R00.2: ICD-10-CM

## 2024-11-25 DIAGNOSIS — I10: ICD-10-CM

## 2024-11-25 DIAGNOSIS — R07.9: Primary | ICD-10-CM

## 2024-11-25 PROCEDURE — 93017 CV STRESS TEST TRACING ONLY: CPT

## 2024-11-25 PROCEDURE — 78452 HT MUSCLE IMAGE SPECT MULT: CPT

## 2024-11-25 PROCEDURE — A9500 TC99M SESTAMIBI: HCPCS

## 2024-11-25 RX ADMIN — REGADENOSON 0.4 MG: 0.08 INJECTION, SOLUTION INTRAVENOUS at 11:01

## 2025-03-14 ENCOUNTER — HOSPITAL ENCOUNTER (EMERGENCY)
Age: 70
Discharge: HOME | End: 2025-03-14
Payer: MEDICARE

## 2025-03-14 VITALS
DIASTOLIC BLOOD PRESSURE: 74 MMHG | HEART RATE: 86 BPM | OXYGEN SATURATION: 97 % | TEMPERATURE: 98.06 F | SYSTOLIC BLOOD PRESSURE: 127 MMHG

## 2025-03-14 VITALS — OXYGEN SATURATION: 97 %

## 2025-03-14 VITALS — HEART RATE: 65 BPM | OXYGEN SATURATION: 96 %

## 2025-03-14 VITALS — OXYGEN SATURATION: 97 % | HEART RATE: 64 BPM

## 2025-03-14 VITALS
OXYGEN SATURATION: 98 % | TEMPERATURE: 98.3 F | DIASTOLIC BLOOD PRESSURE: 74 MMHG | HEART RATE: 73 BPM | SYSTOLIC BLOOD PRESSURE: 123 MMHG

## 2025-03-14 VITALS — OXYGEN SATURATION: 98 % | HEART RATE: 60 BPM | DIASTOLIC BLOOD PRESSURE: 67 MMHG | SYSTOLIC BLOOD PRESSURE: 101 MMHG

## 2025-03-14 VITALS — HEART RATE: 66 BPM | OXYGEN SATURATION: 97 %

## 2025-03-14 VITALS — HEART RATE: 75 BPM | OXYGEN SATURATION: 99 %

## 2025-03-14 VITALS — OXYGEN SATURATION: 95 % | SYSTOLIC BLOOD PRESSURE: 119 MMHG | DIASTOLIC BLOOD PRESSURE: 70 MMHG | HEART RATE: 69 BPM

## 2025-03-14 VITALS — OXYGEN SATURATION: 96 % | HEART RATE: 65 BPM

## 2025-03-14 VITALS — BODY MASS INDEX: 39.1 KG/M2

## 2025-03-14 VITALS — SYSTOLIC BLOOD PRESSURE: 133 MMHG | HEART RATE: 67 BPM | OXYGEN SATURATION: 96 % | DIASTOLIC BLOOD PRESSURE: 71 MMHG

## 2025-03-14 DIAGNOSIS — R06.02: ICD-10-CM

## 2025-03-14 DIAGNOSIS — J06.9: Primary | ICD-10-CM

## 2025-03-14 DIAGNOSIS — R51.9: ICD-10-CM

## 2025-03-14 DIAGNOSIS — R07.9: ICD-10-CM

## 2025-03-14 DIAGNOSIS — R42: ICD-10-CM

## 2025-03-14 LAB
ADD MANUAL DIFF: NO
ALANINE AMINOTRANSFERASE: 25 U/L (ref 14–59)
ALBUMIN GLOBULIN RATIO: 1
ALBUMIN LEVEL: 3.9 G/DL (ref 3.4–5)
ALKALINE PHOSPHATASE: 126 U/L (ref 46–116)
ANION GAP: 18.2
ASPARTATE AMINO TRANSFERASE: 33 U/L (ref 15–37)
BLOOD UREA NITROGEN: 31 MG/DL (ref 7–18)
CALCIUM: 8.9 MG/DL (ref 8.5–10.1)
CARBON DIOXIDE: 25.4 MMOL/L (ref 21–32)
CHLORIDE: 101 MMOL/L (ref 98–107)
CO2 BLD-SCNC: 25.4 MMOL/L (ref 21–32)
ESTIMATED GFR (AFRICAN AMERICA: 35
ESTIMATED GFR (NON-AFRICAN AME: 29
GLOBULIN: 3.9 G/DL
GLUCOSE BLD-MCNC: 103 MG/DL (ref 74–106)
HCT VFR BLD CALC: 35.5 % (ref 36–48)
HEMATOCRIT: 35.5 % (ref 36–48)
HEMOGLOBIN: 12.9 G/DL (ref 12–16)
IMMATURE GRANULOCYTES ABS AUTO: 0.01 10^3/UL (ref 0–0.03)
IMMATURE GRANULOCYTES PCT AUTO: 0.3 % (ref 0–0.5)
INR: 1.03
LACTATE/LACTIC ACID: 1.3 MMOL/L (ref 0.4–2)
LYMPHOCYTES  ABSOLUTE AUTO: 1.1 10^3/UL (ref 1.2–3.8)
MAGNESIUM: 2.1 MG/DL (ref 1.8–2.4)
MCV RBC: 88.5 FL (ref 81–99)
MEAN CORPUSCULAR HEMOGLOBIN: 32.2 PG (ref 26.7–34)
MEAN CORPUSCULAR HGB CONC: 36.3 G/DL (ref 29.9–35.2)
MEAN CORPUSCULAR VOLUME: 88.5 FL (ref 81–99)
NT PRO B TYPE NATRIURETIC PEPT: 172 PG/ML (ref ?–900)
PCO2 BLDV: 46.2 MMHG (ref 40–52)
PH BLDV: 7.34 [PH] (ref 7.33–7.43)
PLATELET # BLD: 217 10^3/UL (ref 150–450)
PLATELET COUNT: 217 10^3/UL (ref 150–450)
POTASSIUM SERPLBLD-SCNC: 4.6 MMOL/L (ref 3.5–5.1)
POTASSIUM: 4.6 MMOL/L (ref 3.5–5.1)
PROTHROMBIN TIME: 10.9 SEC (ref 9–11.6)
RED BLOOD COUNT: 4.01 10^6/UL (ref 4.2–5.4)
SARS-COV-2 AG: NEGATIVE
SODIUM BLD-SCNC: 140 MMOL/L (ref 136–145)
SODIUM: 140 MMOL/L (ref 136–145)
THYROID STIMULATING HORMONE: 1.38 UIU/ML (ref 0.36–3.74)
TOTAL PROTEIN: 7.8 G/DL (ref 6.4–8.2)
WBC # BLD: 3.8 10^3/UL (ref 4–11)
WHITE BLOOD COUNT: 3.8 10^3/UL (ref 4–11)

## 2025-03-14 PROCEDURE — 94640 AIRWAY INHALATION TREATMENT: CPT

## 2025-03-14 PROCEDURE — 85610 PROTHROMBIN TIME: CPT

## 2025-03-14 PROCEDURE — 96375 TX/PRO/DX INJ NEW DRUG ADDON: CPT

## 2025-03-14 PROCEDURE — 82800 BLOOD PH: CPT

## 2025-03-14 PROCEDURE — 85025 COMPLETE CBC W/AUTO DIFF WBC: CPT

## 2025-03-14 PROCEDURE — 84484 ASSAY OF TROPONIN QUANT: CPT

## 2025-03-14 PROCEDURE — 93005 ELECTROCARDIOGRAM TRACING: CPT

## 2025-03-14 PROCEDURE — 84443 ASSAY THYROID STIM HORMONE: CPT

## 2025-03-14 PROCEDURE — 36415 COLL VENOUS BLD VENIPUNCTURE: CPT

## 2025-03-14 PROCEDURE — 83735 ASSAY OF MAGNESIUM: CPT

## 2025-03-14 PROCEDURE — 99285 EMERGENCY DEPT VISIT HI MDM: CPT

## 2025-03-14 PROCEDURE — 87811 SARS-COV-2 COVID19 W/OPTIC: CPT

## 2025-03-14 PROCEDURE — 80053 COMPREHEN METABOLIC PANEL: CPT

## 2025-03-14 PROCEDURE — 96374 THER/PROPH/DIAG INJ IV PUSH: CPT

## 2025-03-14 PROCEDURE — 71045 X-RAY EXAM CHEST 1 VIEW: CPT

## 2025-03-14 PROCEDURE — 83605 ASSAY OF LACTIC ACID: CPT

## 2025-03-14 PROCEDURE — 87804 INFLUENZA ASSAY W/OPTIC: CPT

## 2025-03-14 PROCEDURE — 83880 ASSAY OF NATRIURETIC PEPTIDE: CPT

## 2025-03-14 NOTE — ECG_ITS
The [facility] 
                                        
                                       Test Date:    2025 
Pat Name:     JUNIOR FARLEY           Department:    
Patient ID:   [id number]               Room:         - 
Gender:       Female                   Technician:    
:          1955               Requested By:  
Order Number: [id number]              Reading MD:   HARSHIL BEACH M.D. 
                                 Measurements 
Intervals                              Axis           
Rate:         65                       P:            54 
ND:           176                      QRS:          71 
QRSD:         86                       T:            46 
QT:           400                                     
QTc:          411                                     
                           Interpretive Statements 
1100 Sinus rhythm 
9110 **  normal ECG  ** 
Compared to ECG 10/26/2024 05:24:13 
No significant changes 
Electronically Signed On 3- 20:54:05 EDT by HARSHIL BEACH M.D.

## 2025-03-14 NOTE — XMS_ITS
Comprehensive CCD (C-CDA v2.1)  
  
                           Created on: 2025  
  
  
Federica Allred  
External Reference #: CDR,PersonID:[id number]  
: 1955  
Sex: Female  
  
Demographics  
  
  
                                        Address             [address] [place] [address]  
   
                                        Home Phone          [phone]  
   
                                        Preferred Language  en  
   
                                        Marital Status      Unknown  
   
                                        Latter day Affiliation Unknown  
   
                                        Race                White  
   
                                        Ethnic Group        Not  or Lati  
no  
  
  
Author  
  
  
                                        Organization        [facility]  
  
  
Care Team Providers  
  
  
                                Care Team Member Name Role            Phone  
   
                                Unavailable     Primary Care Provider Unavailabl e REZA, MOHSIN    Admitting       Unavailable  
   
                                No Family, Physician Primary Care    Unavailable  
   
                                GREGORY MONTEJO Attending       Unavailable  
   
                                ELSA REAL Consulting      Unavailable  
   
                                Unavailable     Primary Care Provider Unavailabl  
e  
   
                                Unavailable     Primary Care Provider UnavailKENDRA Pittman   Attending       Unavailable  
   
                                ADAMA, DAY   Attending       Unavailable  
   
                                ADAMA, DAY   Consulting      Unavailable  
   
                                ADAMA, DAY   Admitting       Unavailable  
   
                                NADSARAH, DR ALISTAIR ROA Primary Care    Unavailable  
   
                                MAURICIO, DR ALISTAIR ROA Primary Care    Unavailable  
   
                                MAURICIO, DR ALISTAIR ROA Admitting       Unavailable  
   
                                ZIEBER, DR JENNIFER HARMON Consulting      Unavailable  
   
                                NADERER, DR ALISTAIR ROA Attending       Unavailable  
   
                                NADEREEDEN, DR ALISTAIR ROA Consulting      Unavailable  
   
                                JONATHON VEGA Attending       Unavailable  
   
                                JONATHON VEGA Admitting       Unavailable  
   
                                ADAMA, DAY   Consulting      Unavailable  
   
                                NADSARAH, DR ALISTAIR ROA Primary Care    Unavailable  
   
                                Lombardi, Thomas Consulting      Unavailable  
   
                                Alistair Martínez MD Primary Care Provider [phone]  
   
                                Alistair Martínez MD Unavailable     [phone]  
   
                                ALISTAIR MARTÍNEZ   Attending       Unavailable  
   
                                MAURICIO, ALISTAIR   Attending       Unavailable  
   
                                ALISTAIR MARTÍNEZ   Attending       Unavailable  
  
  
  
Allergies  
  
  
                                                    Allergy   
Classification                          Reported   
Allergen(s)               Allergy Type              Date of   
Onset                     Reaction(s)               Facility  
   
                                                      
(16 sources)        Iodine              Drug Allergy          
5                         Hives, Itching            [place]  
   
                                                      
(20 sources)                            Shellfish-Deriv  
ed Products                             Propensity to   
adverse   
reactions to   
drug                                      
3                                       Hives,   
Shortness Of   
Breath                                  [place]  
   
                                                      
(1 source)   Iodine       Drug Allergy                           [facility]   
Repository  
   
                                                      
(1 source)                Shellfish                 Drug allergy   
(disorder)                                                  [facility]   
Repository  
  
  
  
Medications  
Current Medications  
  
  
  
                      Medication Drug Class(es) Dates      Sig (Normalized) Sig   
(Original)  
   
                                                    Acetaminophen  
(1 source)                                          Start:   
2020                                          acetaminophen   
(TYLENOL) tablet   
650 mg  
   
                                                    acetaminophen 325   
mg / butalbital 50   
mg / caffeine 40 mg   
oral tablet  
(1 source)                              Barbiturate, Central   
Nervous System   
Stimulant,   
Methylxanthine                          Start:   
2020                                          butalbital-acetami  
nophen-caffeine   
(FIORICET, ESGIC)   
per tablet 1   
tablet  
   
                                                    Acetaminophen /   
HYDROcodone  
(5 sources)               Opioid Agonist            Start:   
2021                                          hydrocodone-acetam  
inophen (NORCO)   
tablet 5-325 mg   
(STARTER PACK)  
  
  
  
                                                    Start: 2013  
End: 2020                         take 1 tablet by mouth   
every six hours as   
needed for pain                         HYDROcodone-acetaminophen (NORCO) 5-325   
MG   
per tablet Take 1 tablet by mouth every 6   
hours as needed for Pain. 15 tablet 0   
2013 Discontinued (LIST   
CLEANUP)  
  
  
  
                                                    aspirin 81 mg   
delayed release   
oral tablet  
(1 source)                              Platelet Aggregation   
Inhibitor,   
Nonsteroidal   
Anti-inflammatory Drug                  Start:   
2020                              take 1   
tablet by   
mouth once   
daily                                   aspirin EC 81 MG   
EC tablet   
Indications:   
Abnormal stress   
test , Syncope,   
unspecified   
syncope type ,   
Pericarditis,   
unspecified   
chronicity,   
unspecified type ,   
Essential   
hypertension Take   
1 tablet by mouth   
daily 30 tablet 3   
2020 Active  
   
                                                    atorvastatin 40 mg   
oral tablet  
(6 sources)                             HMG-CoA Reductase   
Inhibitor                               Start:   
2024  
End: 10-                         take 1   
tablet by   
mouth once   
daily at   
bedtime                                 atorvastatin   
(Lipitor) 40 MG   
tablet   
Indications:   
Hyperlipidemia,   
unspecified   
(CMS/HCC) TAKE 1   
TABLET BY MOUTH   
EVERYDAY AT   
BEDTIME 30 tablet   
2 2024   
10/28/2024   
Discontinued  
  
  
  
                                        Start: 2020   take 1 tablet by anu  
 once   
daily                                   atorvastatin (LIPITOR) 10 MG tablet   
Indications: Abnormal stress test ,   
Syncope, unspecified syncope type ,   
Pericarditis, unspecified chronicity,   
unspecified type , Essential   
hypertension Take 1 tablet by mouth   
daily 90 tablet 1 2020 Active  
  
  
  
                                                    bisacodyl 5 mg delayed   
release oral tablet  
(1 source)                              Stimulant   
Laxative                                Start:   
2020                              take 5 mg by   
mouth once daily   
as needed for   
constipation                            5 mg, Oral, DAILY   
PRN,   
Constipation,   
Starting Mon   
20 at 0658   
First line   
therapy for   
constipation.  
   
                                                    hydroCHLOROthiazide 25   
mg / lisinopril 20 mg   
oral tablet  
(11 sources)                            Thiazide   
Diuretic,   
Angiotensin   
Converting   
Enzyme   
Inhibitor                               Start:   
2024                              take 1 tablet by   
mouth once daily                        lisinopril-hydroC  
HLOROthiazide   
20-25 MG tablet   
Indications:   
Essential   
(primary)   
hypertension   
(CMS/HCC) ,   
Benign essential   
hypertension   
(CMS/HCC) TAKE 1   
TABLET BY MOUTH   
EVERY DAY 90   
tablet 1   
2024 Active  
  
  
  
                                        Start: 2024   take 1 tablet by anu  
th   
once daily                              lisinopril-hydroCHLOROthiazide 20-25 MG   
tablet Indications: Essential (primary)   
hypertension (CMS/HCC) , Benign essential   
hypertension (CMS/HCC) TAKE 1 TABLET BY MOUTH   
EVERY DAY 90 tablet 1 2024 Active  
  
  
  
                                                    hydrOXYzine   
hydrochloride 25 mg   
oral tablet  
(11 sources)              Antihistamine             Start:   
2024                              take 1 tablet   
by mouth four   
times daily as   
needed                                  hydrOXYzine HCl   
(Atarax) 25 MG   
tablet   
Indications:   
Generalized   
anxiety disorder   
(CMS/HCC) TAKE 1   
TABLET BY MOUTH   
FOUR TIMES A DAY   
AS NEEDED 360   
tablet 1   
2024 Active  
   
                                                    lidocaine   
hydrochloride 20   
mg/ml mucous   
membrane topical   
solution  
(1 source)                              Antiarrhythmic,   
Amide Local   
Anesthetic                              Start:   
2021                                          lidocaine viscous   
hcl (XYLOCAINE) 2   
% solution 15 mL  
   
                                                    metoprolol tartrate   
25 mg oral tablet  
(7 sources)                             beta-Adrenergic   
Blocker                                 Start:   
2016  
End: 2020                         take 1 tablet   
by mouth twice   
daily                                   metoprolol   
tartrate   
(LOPRESSOR) 25 MG   
tablet Take 1   
tablet by mouth 2   
times daily 60   
tablet 3   
2020 Active  
   
                                                    24 hr nicotine   
0.875 mg/hr   
transdermal system  
(1 source)                              Cholinergic   
Nicotinic Agonist                       Start:   
2020                                          nicotine   
(NICODERM CQ) 21   
MG/24HR 1 patch  
   
                                                    omeprazole 40 mg   
delayed release   
oral capsule  
(3 sources)                             Proton Pump   
Inhibitor                               Start:   
2020                              take 1 capsule   
by mouth once   
daily before   
breakfast                               omeprazole   
(PRILOSEC) 40 MG   
delayed release   
capsule   
Indications:   
Syncope,   
unspecified   
syncope type ,   
Pericarditis,   
unspecified   
chronicity,   
unspecified type   
, Essential   
hypertension ,   
Tobacco abuse ,   
Depression,   
unspecified   
depression type ,   
Atypical migraine   
Take 1 capsule by   
mouth every   
morning (before   
breakfast) 90   
capsule 3   
2020 Active  
   
                                                    pantoprazole 40 mg   
delayed release   
oral tablet  
(4 sources)                             Proton Pump   
Inhibitor                               Start:   
2020                              take 1 tablet   
by mouth once   
daily before   
breakfast                               pantoprazole   
(PROTONIX) 40 MG   
tablet Take 1   
tablet by mouth   
every morning   
(before   
breakfast) To   
take on empty   
stomach 45 min   
before eating, 30   
tablet 3   
2020 Active  
  
  
  
                                                    Start: 2015  
End: 2020                         take 40 mg by mouth once daily   
before breakfast                        40 mg, Oral, DAILY BEFORE   
BREAKFAST, First dose on 20 at 0800 Do not crush or   
break.  
  
  
  
                                                    pantoprazole (PROTONIX) 80   
mg in sodium chloride 0.9   
% 100 mL infusion  
(1 source)                      Start: 2020                 pantoprazole (  
PROTONIX)   
80 mg in sodium chloride   
0.9 % 100 mL infusion  
   
                                                    Potassium Chloride  
(1 source)                      Start: 2020                 potassium chlo  
ride   
(KLOR-CON M) extended   
release tablet 40 mEq  
   
                                                    Promethazine  
(1 source)      Phenothiazine   Start: 2020                 promethazine (  
PHENERGAN)   
tablet 12.5 mg  
   
                                                    3 ml sodium chloride 9   
mg/ml injection  
(4 sources)                     Start: 2020                 10 mL, Intrave  
nous, EVERY   
12 HOURS SCHEDULED (2   
times per day), First   
dose on 20 at   
0900  
  
  
  
                                        Start: 2020   take 10 mL intraveno  
us route once   
as needed                               10 mL, Intravenous, PRN, Line   
Care, After every IV line use,   
Starting 20 at 0657  
   
                                                    Start: 2020  
End: 2020                                     Intravenous, at 50 mL/hr,   
CONTINUOUS, Starting 20   
at 0730  
   
                                                    Start: 2020  
End: 2020                                     0.9 % sodium chloride bolus  
  
  
  
                                                    topiramate 25 mg oral tablet  
(5 sources)                     Start: 2020                 topiramate (TO  
PAMAX) tablet 25 mg  
  
  
  
                                        Start: 2020   take 1 tablet by anu  
th once   
daily                                   topiramate (TOPAMAX) 25 MG tablet   
Take 1 tablet by mouth nightly 30   
tablet 3 2020 Active  
  
  
  
                                                    traMADol   
hydrochloride 50 mg   
oral tablet  
(15 sources)              Opioid Agonist            Start: 2024  
End: 2025                         take 1 tablet   
by mouth four   
times daily   
as needed for   
pain                                    traMADol (Ultram) 50   
MG tablet   
Indications: Primary   
osteoarthritis of   
left wrist Take 1   
tablet (50 mg) by   
mouth 4 (four) times   
a day as needed for   
severe pain or   
moderate pain 120   
tablet 2025 Active  
  
  
  
                                                    Start: 2024  
End: 2024                         take 1 tablet by mouth four   
times daily as needed for pain          traMADol (Ultram) 50 MG tablet   
Indications: Primary osteoarthritis   
of left wrist Take 1 tablet (50 mg)   
by mouth 4 (four) times a day as   
needed for severe pain or moderate   
pain 120 tablet 2024   
Active  
  
  
  
                                                    7 actuat   
umeclidinium 0.0625   
mg/actuat dry   
powder inhaler  
(11 sources)              Anticholinergic           Start:   
2024                              take 1 puff(s)   
by inhalation   
once daily                              Incruse Ellipta   
62.5 MCG/ACT   
aerosol powder   
Indications:   
Chronic   
obstructive   
pulmonary disease,   
unspecified   
(CMS/HCC) , Other   
specified chronic   
obstructive   
pulmonary disease   
(CMS/HCC) INHALE 1   
PUFF DAILY 30 each   
2 2024   
Active  
  
  
  
Completed/Discontinued Medications  
  
  
  
                          Medication   Drug Class(es) Dates        Sig   
(Normalized)                            Sig (Original)  
   
                                                    benzocaine 140 mg/ml /   
butamben 20 mg/ml /   
tetracaine 20 mg/ml   
mucosal spray  
(1 source)                              [place] Local   
Anesthetic,   
Standardized   
Chemical Allergen                       Start:   
  
1  
End:   
  
1                                                   butamben-tetracaine-b  
enzocaine (CETACAINE)   
spray 1 spray  
   
                                                    1 ml diphenhydrAMINE   
hydrochloride 50 mg/ml   
cartridge  
(1 source)                              Histamine-1   
Receptor   
Antagonist                              Start:   
  
0  
End:   
  
0                                                   diphenhydrAMINE   
(BENADRYL) injection   
50 mg  
   
                                                    docusate sodium 100 mg   
oral capsule  
(2 sources)                                           
End:   
  
0                                       take 1 capsule   
by mouth twice   
daily                                   docusate sodium   
(COLACE) 100 MG   
capsule Take 100 mg   
by mouth 2 times   
daily 0 2020   
Discontinued (LIST   
CLEANUP)  
   
                                                    ibuprofen 600 mg oral   
tablet  
(2 sources)                             Nonsteroidal   
Anti-inflammatory   
Drug                                    Start:   
  
5  
End:   
  
0                                       take 1 tablet   
by mouth three   
times daily at   
mealtime                                ibuprofen   
(ADVIL;MOTRIN) 600 MG   
tablet Take 1 tablet   
by mouth 3 times   
daily (with meals)   
120 tablet 3   
2015   
Discontinued (LIST   
CLEANUP)  
   
                                                    iopamidol (ISOVUE-370)   
76 % injection 75 mL  
(1 source)                                          Start:   
  
0  
End:   
  
0                                                   iopamidol   
(ISOVUE-370) 76 %   
injection 75 mL  
   
                                                    methylPREDNISolone 125   
mg injection  
(1 source)                Corticosteroid            Start:   
  
0  
End:   
  
0                                                   methylPREDNISolone   
sodium (SOLU-MEDROL)   
injection 125 mg  
   
                                                    1 ml morphine sulfate   
2 mg/ml cartridge  
(1 source)                Opioid Agonist            Start:   
  
0  
End:   
  
0                                                   morphine (PF)   
injection 0.5 mg  
  
  
  
                                                    Start: 2020  
End: 2020                                     morphine (PF) injection 0.5   
mg  
  
  
  
                                                    2 ml ondansetron 2   
mg/ml injection  
(1 source)                              Serotonin-3 Receptor   
Antagonist                              Start: 2020  
End: 2020                                     ondansetron (ZOFRAN)   
injection 4 mg  
   
                                                    pantoprazole   
(PROTONIX) 80 mg in   
sodium chloride 0.9 %   
50 mL bolus  
(1 source)                                          Start: 2020  
End: 2020                                     pantoprazole   
(PROTONIX) 80 mg in   
sodium chloride 0.9 %   
50 mL bolus  
   
                                                    penicillin v potassium   
250 mg oral tablet  
(2 sources)                                         Start: 2021  
End: 2021                                     penicillin v potassium   
(VEETID) tablet 500 mg  
  
  
  
                                                    Start: 2021  
End: 2021                         take 1 tablet by mouth three   
times daily                             penicillin v potassium (VEETID) 500   
MG tablet Take 1 tablet by mouth 3   
times daily for 10 days 30 tablet 0   
2021 Active  
  
  
  
                                                    technetium sestamibi   
(CARDIOLITE) injection 30   
millicurie  
(1 source)                                          Start: 2020  
End: 2020                                     technetium sestamibi   
(CARDIOLITE) injection 30   
millicurie  
  
  
  
Problems  
Active Problems  
  
  
                                                    Problem   
Classification  Problem         Date            Documented Date Episodic/Chronic  
   
                                                    Anxiety disorders  
(15 sources)                            Generalized anxiety   
disorder;   
Translations:   
[Generalized anxiety   
disorder]                               Onset:   
03-                03-                Chronic  
   
                                                    Chronic obstructive   
pulmonary disease and   
bronchiectasis  
(14 sources)                            Chronic obstructive   
pulmonary disease   
with (acute)   
exacerbation;   
Translations:   
[Chronic obstructive   
lung disease]                           Onset:   
03-                03-                Chronic  
   
                                                    Coma; stupor; and   
brain damage  
(1 source)                              Unresponsive ;   
Translations:   
[Unresponsive   
episode]                                                      
   
                                                    Disorders of lipid   
metabolism  
(19 sources)                            Dyslipidemia;   
Translations:   
[Hyperlipidemia,   
unspecified]                            Onset:   
03-                03-                Chronic  
   
                                                    Essential   
hypertension  
(20 sources)                            Hypertensive   
disorder;   
Translations:   
[Essential   
hypertension]                           Onset:   
03-                06-                Chronic  
   
                                                    Headache; including   
migraine  
(12 sources)                            Migraine;   
Translations:   
[Chronic   
post-traumatic   
headache]                               Onset:   
03-                03-                Chronic  
   
                                                    Headache; including   
migraine  
(6 sources)                             Headache;   
Translations:   
[Headache]                              2020          Episodic  
   
                                                    Hypertension with   
complications and   
secondary   
hypertension  
(1 source)                              Hypertensive urgency;   
Translations:   
[HYPERTENSIVE   
URGENCY]                                Onset:   
2023                                          Chronic  
   
                                                    Inflammation;   
infection of eye   
(except that caused   
by tuberculosis or   
sexually   
transmitteddisease)  
(1 source)                              Acute atopic   
conjunctivitis,   
bilateral;   
Translations: [ACUTE   
ATOPIC CONJUNCTIVITIS   
BILAT]                                  Onset:   
2023                                          Episodic  
   
                                                    Mood disorders  
(14 sources)                            Depressive disorder;   
Translations:   
[Recurrent major   
depressive episodes,   
mild ]                                  Onset:   
03-  
Resolved:   
10-                03-                Chronic  
   
                                                    Osteoarthritis  
(18 sources)                            Osteoarthritis of   
joint of left wrist;   
Translations:   
[Primary   
osteoarthritis, left   
wrist]                                  Onset:   
03-                10-                Chronic  
   
                                                    Other aftercare  
(4 sources)                             Long-term current use   
of drug therapy;   
Translations: [Other   
long term (current)   
drug therapy]                           Onset:   
2025                Episodic  
   
                                                    Other eye disorders  
(3 sources)                             Other specified   
disorders of eye and   
adnexa; Translations:   
[OTHER SPEC DISORDERS   
EYE AND ADNEXA]                         Onset:   
2023                                          Episodic  
   
                                                    Other lower   
respiratory disease  
(5 sources)                             Pleuritic pain;   
Translations:   
[Pleurodynia]                           2015          Episodic  
   
                                                    Other non-traumatic   
joint disorders  
(4 sources)                             Pain in left wrist;   
Translations: [PAIN   
IN LEFT WRIST]                          Onset:   
2023                                          Episodic  
   
                                                    Other nutritional;   
endocrine; and   
metabolic disorders  
(4 sources)                             Obesity caused by   
energy imbalance;   
Translations: [Class   
1 obesity due to   
excess calories with   
serious comorbidity   
and body mass index   
(BMI) of 31.0 to 31.9   
in adult]                               Onset:   
2025                Chronic  
   
                                                    Other screening for   
suspected conditions   
(not mental disorders   
or infectious   
disease)  
(4 sources)                             Patient encounter   
status; Translations:   
[Encounter for   
screening mammogram   
for malignant   
neoplasm of breast]                     10-          Episodic  
   
                                                    Residual codes;   
unclassified  
(1 source)                              Tobacco user;   
Translations:   
[Tobacco abuse]                                             Chronic  
   
                                                    Substance-related   
disorders  
(7 sources)                             Nicotine dependence,   
cigarettes,   
uncomplicated;   
Translations:   
[Cigarette smoker ]                     Onset:   
2023                Chronic  
   
                                                    Syncope  
(1 source)                              Syncope;   
Translations:   
[Syncope, unspecified   
syncope type]                                               Episodic  
   
                                                    Unclassified  
(1 source)                              PERSONAL HISTORY OF   
COVID-19;   
Translations:   
[PERSONAL HISTORY OF   
COVID-19]                               Onset:   
2023                                            
   
                                                    Unclassified  
(2 sources)                             COUGH, UNSPECIFIED;   
Translations: [COUGH,   
UNSPECIFIED]                            Onset:   
2023                                            
  
  
Past or Other Problems  
  
  
                      Problem Classification Problem    Date       Documented Da  
te Episodic/Chronic  
   
                                                    Allergic reactions  
(10 sources)                            Chronic idiopathic   
urticaria;   
Translations:   
[Idiopathic   
urticaria]                              Onset:   
10-                10-                Episodic  
   
                                                    Cardiac dysrhythmias  
(14 sources)                            Palpitations;   
Translations:   
[Palpitations]                          Onset:   
10-                10-                Episodic  
   
                                                    Deficiency and other   
anemia  
(5 sources)                             Iron deficiency   
anemia;   
Translations:   
[Iron deficiency   
anemia,   
unspecified]                            Onset:   
2015                Episodic  
   
                                                    Disorders of teeth and   
jaw  
(13 sources)                            Dental caries;   
Translations:   
[Dental caries,   
unspecified]                            Onset:   
03-  
Resolved:   
10-                                          Episodic  
   
                                                    Epilepsy; convulsions  
(5 sources)                             Seizure;   
Translations:   
[Unspecified   
convulsions]                            Onset:   
2020                Episodic  
   
                                                    Intracranial injury  
(11 sources)                            History of   
traumatic brain   
injury;   
Translations:   
[Personal history   
of traumatic brain   
injury]                                 Onset:   
03-                03-                Episodic  
   
                                                    Miscellaneous mental   
health disorders  
(11 sources)                            Primary insomnia;   
Translations:   
[Primary insomnia]                      Onset:   
03-  
Resolved:   
02-                03-                Chronic  
   
                                                    Nonspecific chest pain  
(14 sources)                            Chest pain;   
Translations:   
[Chest pain,   
unspecified]                            Onset:   
10-  
Resolved:   
02-                10-                Episodic  
   
                                                    Other circulatory   
disease  
(11 sources)                            History of   
pericarditis;   
Translations:   
[Personal history   
of other diseases   
of the circulatory   
system]                                 Onset:   
03-                03-                Episodic  
   
                                                    Other gastrointestinal   
disorders  
(5 sources)                             Dysphagia;   
Translations:   
[Dysphagia,   
unspecified]                            Onset:   
2015  
Resolved:   
2015                Episodic  
   
                                                    Other gastrointestinal   
disorders  
(5 sources)                             Constipation;   
Translations:   
[Constipation,   
unspecified]                            Onset:   
2015  
Resolved:   
2015                Episodic  
   
                                                    Other nervous system   
disorders  
(8 sources)                             Drug-induced   
myopathy;   
Translations:   
[Drug-induced   
myopathy]                               Onset:   
03-                10-                Episodic  
   
                                                    Lucina-; endo-; and   
myocarditis;   
cardiomyopathy (except   
that caused by   
tuberculosis or   
sexually transmitted   
disease)  
(11 sources)                            Pericardial   
effusion;   
Translations:   
[Pericarditis]                          Onset:   
2015                Episodic  
   
                                                    Residual codes;   
unclassified  
(3 sources)                             Other specified   
health status;   
Translations:   
[Other drug   
allergy]                                Onset:   
03-                03-                Episodic  
   
                                                    Unclassified  
(1 source)                              COUGH,   
UNSPECIFIED;   
Translations:   
[COUGH,   
UNSPECIFIED]                            Onset:   
2023                                            
  
  
  
Results  
  
  
                          Test Name    Value        Interpretation Reference   
Range                                   Facility  
   
                                                    CARDIAC LOLI 3-6on   
3   
   
                                                    CK [Catalytic   
activity/Vol]   89 U/L          Normal                    [facility]  
   
                                        Comment on above:   Performed By: #### C  
MREP ####  
[facility]  
[address] [address]  
Dr. Ramirez Ching   
   
                      CK.MB [Mass/Vol] ng/mL      Normal     <=3.60     [facility]  
   
                                        Comment on above:   Performed By: #### C  
MREP ####  
[facility]  
[address] [address]  
Dr. Ramirez Ching   
   
                      HSTROP     5.0 pg/mL  Normal     4.0-51.3   [facility]  
   
                                        Comment on above:   Result Comment: CUT-  
OFF POINTS HAVE BEEN ESTABLISHED BASED ON   
THE   
FOURTH UNIVERSAL DEFINITIONS OF MYOCARDIAL  
INFARCTION. THE UPPER REFERENCE LIMIT (URL) OF TROPONIN, DEFINED   
AS THE 99TH PERCENTILE OF  
cTnI DISTRIBUTION IN A REFERENCE POPULATION, HAS BEEN CONFIRMED   
AS THE DECISION THRESHOLD  
FOR MI DIAGNOSIS.   
   
                                                            Performed By: #### C  
MREP ####  
[facility]  
[address] [address]  
Dr. Ramirez Ching   
   
                                                    CARDIAC LOLI ADMITon 03-04-2  
023   
   
                                                    CK [Catalytic   
activity/Vol]   112 U/L         Normal                    [facility]  
   
                                        Comment on above:   Performed By: #### B  
EFRAIN AMBRIZ ####  
[facility]  
[address] [place] [address]  
Dr. Ramirez Ching   
   
                      CK.MB [Mass/Vol] ng/mL      Normal     <=3.60     [facility]  
   
                                        Comment on above:   Performed By: #### EFRAIN SINCLAIR MP ####  
[facility]  
[address] [place] [address]  
Dr. Ramirez Ching   
   
                      HSTROP     8.3 pg/mL  Normal     4.0-51.3   [facility]  
   
                                        Comment on above:   Result Comment: CUT-  
OFF POINTS HAVE BEEN ESTABLISHED BASED ON   
THE   
FOURTH UNIVERSAL DEFINITIONS OF MYOCARDIAL  
INFARCTION. THE UPPER REFERENCE LIMIT (URL) OF TROPONIN, DEFINED   
AS THE 99TH PERCENTILE OF  
cTnI DISTRIBUTION IN A REFERENCE POPULATION, HAS BEEN CONFIRMED   
AS THE DECISION THRESHOLD  
FOR MI DIAGNOSIS.   
   
                                                            Performed By: #### EFRAIN SINCLAIR MP ####  
[facility]  
[address] [place] [address]  
Dr. Ramirez Ching   
   
                      BRIAN        34 ng/mL   Normal     9-82       [facility]  
   
                                        Comment on above:   Performed By: #### EFRAIN SINCLAIR MP ####  
[facility]  
[address] [place] [address]  
Dr. Ramirez Ching   
   
                                                    CBC AUTO DIFFon 2023   
   
                      BASO #     0.0 103/ul Normal     0.0-0.1    [facility]  
   
                                        Comment on above:   Performed By: #### C  
BC ####  
[facility]  
[address] [place] [address]  
Dr. Ramirez Ching   
   
                                                    Basophils/100 WBC   
(Bld)           0.3 %           Normal          0.2-2.0         [facility]  
   
                                        Comment on above:   Performed By: #### C  
BC ####  
[facility]  
[address] [place] [address]  
Dr. Ramirez Ching   
   
                      EO #       0.2 103/ul Normal     0.0-0.7    [facility]  
   
                                        Comment on above:   Performed By: #### C  
BC ####  
[facility]  
[address] [place] [address]  
Dr. Ramirez Ching   
   
                                                    Eosinophils/100 WBC   
(Bld)           2.7 %           Normal          0.9-7.0         [facility]  
   
                                        Comment on above:   Performed By: #### C  
BC ####  
[facility]  
[address] [place] [address]  
Dr. Ramirez Ching   
   
                                                    Erythrocyte   
distribution width   
(RBC) [Ratio]   13.9 %          Normal          11.0-15.0       [facility]  
   
                                        Comment on above:   Performed By: #### C  
BC ####  
[facility]  
[address] [place] [address]  
Dr. Ramirez Ching   
   
                                                    Hematocrit (Bld)   
[Volume fraction] 38.3 %          Normal          36.0-48.0       [facility]  
   
                                        Comment on above:   Performed By: #### C  
BC ####  
[facility]  
[address] [place] [address]  
Dr. Ramirez Ching   
   
                                                    Hemoglobin (Bld)   
[Mass/Vol]      13.5 g/dL       Normal          12.0-16.0       [facility]  
   
                                        Comment on above:   Performed By: #### C  
BC ####  
[facility]  
[address] [place] [address]  
Dr. Ramirez Ching   
   
                      IG #       0.01 10e3/ul Normal     0.00-0.03  [facility]  
   
                                        Comment on above:   Performed By: #### C  
BC ####  
[facility]  
[address] [place] [address]  
Dr. Ramirez Ching   
   
                      IG %       0.2 %      Normal     0.0-0.5    [facility]  
   
                                        Comment on above:   Performed By: #### C  
BC ####  
[facility]  
[address] [place] [address]  
Dr. Ramirez Ching   
   
                      LYMPH #    1.6 103/ul Normal     1.2-3.8    [facility]  
   
                                        Comment on above:   Performed By: #### C  
BC ####  
[facility]  
[address] [place] [address]  
Dr. Ramirez Ching   
   
                                                    Lymphocytes/100 WBC   
(Bld)           27.3 %          Normal          20.5-60.0       [facility]  
   
                                        Comment on above:   Performed By: #### C  
BC ####  
[facility]  
[address] [place] [address]  
Dr. Ramirez Ching   
   
                      MANUAL DIFF REQ NO         Normal                [facility]  
   
                                        Comment on above:   Performed By: #### C  
BC ####  
[facility]  
[address] [address]  
Dr. Ramirez Ching   
   
                                                    MCH (RBC) [Entitic   
mass]           31.0 pg         Normal          26.7-34.0       [facility]  
   
                                        Comment on above:   Performed By: #### C  
BC ####  
[facility]  
[address] [place] [address]  
Dr. Ramirez Ching   
   
                                                    MCHC (RBC)   
[Mass/Vol]      35.2 g/dL       Normal          29.9-35.2       [facility]  
   
                                        Comment on above:   Performed By: #### C  
BC ####  
[facility]  
[address] [place] [address]  
Dr. Ramirez Ching   
   
                                                    MCV (RBC) [Entitic   
vol]            88.0 fL         Normal          81.0-99.0       [facility]  
   
                                        Comment on above:   Performed By: #### C  
BC ####  
[facility]  
[address] [place] [address]  
Dr. Ramirez Ching   
   
                      MONO #     0.5 103/ul Normal     0.3-0.8    [facility]  
   
                                        Comment on above:   Performed By: #### C  
BC ####  
[facility]  
[address] [place] [address]  
Dr. Ramirez Ching   
   
                                                    Monocytes/100 WBC   
(Bld)           7.7 %           Normal          1.7-12.0        [facility]  
   
                                        Comment on above:   Performed By: #### C  
BC ####  
[facility]  
[address] [place] [address]  
Dr. Ramirez Ching   
   
                      NEUT #     3.6 103/ul Normal     1.4-6.5    [facility]  
   
                                        Comment on above:   Performed By: #### C  
BC ####  
[facility]  
[address] [place] [address]  
Dr. Ramirez Ching   
   
                                                    Neutrophils/100 WBC   
(Bld)           61.8 %          Normal          43.0-75.0       [facility]  
   
                                        Comment on above:   Performed By: #### C  
BC ####  
[facility]  
[address] [place] [address]  
Dr. Ramirez Ching   
   
                                                    Platelet mean volume   
(Bld) [Entitic vol] 10.5 fL         Normal          9.5-13.5        [facility]  
   
                                        Comment on above:   Performed By: #### C  
BC ####  
[facility]  
[address] [place] [address]  
Dr. Ramirez Ching   
   
                      PLT        219 103/ul Normal     150-450    [facility]  
   
                                        Comment on above:   Performed By: #### C  
BC ####  
[facility]  
[address] [place] [address]  
Dr. Ramirez Ching   
   
                      RBC        4.35 106/ul Normal     4.20-5.40  [facility]  
   
                                        Comment on above:   Performed By: #### C  
BC ####  
[facility]  
[address] [place] [address]  
Dr. Ramirez Ching   
   
                      WBC        5.8 103/ul Normal     4.0-11.0   [facility]  
   
                                        Comment on above:   Performed By: #### C  
BC ####  
[facility]  
[address] [place] [address]  
Dr. Ramirez Ching   
   
                                                    D-DIMERon 2023   
   
                      D-DIMER    0.32 mg/L FEU Normal     <=0.59     [facility]  
   
                                        Comment on above:   Performed By: #### D  
DIM ####  
[facility]  
[address] [place] [address]  
Dr. Ramirez Ching   
   
                      D-DIMER COMMENTS SEE BELOW  Normal                [facility]  
   
                                        Comment on above:   Result Comment: Incr  
eases in D-Dimer concentration observed   
with   
thromboembolic events can be variable due to localization, size,   
and age of the thrombus. Therefore, a thromboembolic event cannot   
be diagnosed with certainty on the basis of the reference range.   
D-Dimers may also be elevated for a variety of disorders   
including: advanced age, pregnancy, coronary disease, cancer,   
liver disease, infection, inflammation, hematoma, DIC, trauma,   
post-surgery, diabetes, thrombolytic or anticoagulant therapy,   
stress, and generalized hospitalization.   
   
                                                            Performed By: #### D  
DIM ####  
[facility]  
[address] [place] [address]  
Dr. Ramirez Ching   
   
                                                    PROF CHEM 8 (BAS METB)on    
   
                                                    Anion gap   
[Moles/Vol]     15.5 mmol/L     Normal                          [facility]  
   
                                        Comment on above:   Performed By: #### B  
MP, CMADM ####  
[facility]  
[address] [place], [place] [id number]  
Dr. Ramirez Ching   
   
                      Calcium [Mass/Vol] 9.1 mg/dL  Normal     8.5-10.1   [facility]  
   
                                        Comment on above:   Performed By: #### B  
PB, EFRAIN ####  
[facility]  
[address] [place] [address]  
Dr. Ramirez Ching   
   
                      Chloride [Moles/Vol] 107 mmol/L Normal          [facility]  
   
                                        Comment on above:   Performed By: #### B  
PB, CMADM ####  
[facility]  
[address] [place] [address]  
Dr. Ramirez Ching   
   
                      CO2 [Moles/Vol] 24.6 mmol/L Normal     21.0-32.0  [facility]  
   
                                        Comment on above:   Performed By: #### B  
PB, SALEEMDM ####  
[facility]  
[address] [place] [address]  
Dr. Ramirez Ching   
   
                                                    Creatinine   
[Mass/Vol]      0.89 mg/dL      Normal          0.55-1.02       [facility]  
   
                                        Comment on above:   Performed By: #### B  
PB, SALEEMDM ####  
[facility]  
[address] [place] [address]  
Dr. Ramirez Ching   
   
                      EGFR-AF AMERICAN >60        Normal     >=60       [facility]  
   
                                        Comment on above:   Performed By: #### B  
PB, SALEEMDM ####  
[facility]  
[address] [place] [address]  
Dr. Ramirez Ching   
   
                      EGFR-NON AF AMERICAN >60        Normal     >=60       [facility]  
   
                                        Comment on above:   Performed By: #### B  
PB, SALEEMDM ####  
[facility]  
[address] [place] [address]  
Dr. Ramirez Ching   
   
                      Glucose [Mass/Vol] 130 mg/dL  Critically high      [facility]  
   
                                        Comment on above:   Performed By: #### B  
PB, SALEEMDM ####  
[facility]  
[address] [place] [address]  
Dr. Ramierz Ching   
   
                                                    Potassium   
[Moles/Vol]     4.1 mmol/L      Normal          3.5-5.1         [facility]  
   
                                        Comment on above:   Performed By: #### B  
PB, SALEEMDM ####  
[facility]  
[address] [place] [address]  
Dr. Ramirez Ching   
   
                      Sodium [Moles/Vol] 143 mmol/L Normal     136-145    [facility]  
   
                                        Comment on above:   Performed By: #### B  
EFRAIN AMBRIZ ####  
[facility]  
[address] [place] [address]  
Dr. Ramirez Ching   
   
                                                    Urea nitrogen   
[Mass/Vol]      17.0 mg/dL      Normal          7.0-18.0        [facility]  
   
                                        Comment on above:   Performed By: #### B  
EFRAIN AMBRIZ ####  
[facility]  
[address] [place] [address]  
Dr. Ramirez Ching   
   
                                                    Urea   
nitrogen/Creatinine   
[Mass ratio]    19.1 mg/mg      Normal                          [facility]  
   
                                        Comment on above:   Performed By: #### B  
EFRAIN AMBRIZ ####  
[facility]  
[address] [place] [address]  
Dr. Ramirez Ching   
   
                                                    XR CHEST 2 Von 2023   
   
                                                    SARS-CoV-2   
(COVID-19) RNA   
BENITEZ+probe Ql (Unsp   
spec)                                   EXAM: XR CHEST 2 V  
HISTORY: Shortness of   
breath, cough and   
chest pain. Recent   
Covid infection.  
COMPARISON: None.  
TECHNIQUE: PA and   
lateral views.  
FINDINGS:   
Atherosclerotic   
calcification of the   
thoracic aorta.   
Cardiac size and  
pulmonary vascularity   
are within normal   
limits. The lungs and   
the costophrenic  
angles are clear.  
IMPRESSION:  
No acute   
cardiopulmonary   
disease.  
Electronically   
authenticated by:   
THOMAS LOMBARDI Date:   
2023 20:12    Normal                                  [facility]  
   
                                                    CBC AUTO DIFFon 2023   
   
                      BASO #     0.0 103/ul Normal     0.0-0.1    [facility]  
   
                                        Comment on above:   Performed By: #### C  
BC ####  
[facility]  
[address] [place] [address]  
Dr. Ramirez Ching   
   
                                                    Basophils/100 WBC   
(Bld)           0.3 %           Normal          0.2-2.0         [facility]  
   
                                        Comment on above:   Performed By: #### C  
BC ####  
[facility]  
[address] [place] [address]  
Dr. Ramirez Ching   
   
                      EO #       0.2 103/ul Normal     0.0-0.7    [facility]  
   
                                        Comment on above:   Performed By: #### C  
BC ####  
[facility]  
[address] [place] [address]  
Dr. Ramirez Ching   
   
                                                    Eosinophils/100 WBC   
(Bld)           3.4 %           Normal          0.9-7.0         [facility]  
   
                                        Comment on above:   Performed By: #### C  
BC ####  
[facility]  
[address] [place] [address]  
Dr. Ramirez Ching   
   
                                                    Erythrocyte   
distribution width   
(RBC) [Ratio]   12.7 %          Normal          11.0-15.0       [facility]  
   
                                        Comment on above:   Performed By: #### C  
BC ####  
[facility]  
[address] [place] [address]  
Dr. Ramirez Ching   
   
                                                    Hematocrit (Bld)   
[Volume fraction] 37.4 %          Normal          36.0-48.0       [facility]  
   
                                        Comment on above:   Performed By: #### C  
BC ####  
[facility]  
[address] [place] [address]  
Dr. Ramirez Ching   
   
                                                    Hemoglobin (Bld)   
[Mass/Vol]      13.7 g/dL       Normal          12.0-16.0       [facility]  
   
                                        Comment on above:   Performed By: #### C  
BC ####  
[facility]  
[address] [place] [address]  
Dr. Ramirez Ching   
   
                      IG #       0.02 10e3/ul Normal     0.00-0.03  [facility]  
   
                                        Comment on above:   Performed By: #### C  
BC ####  
[facility]  
[address] [place] [address]  
Dr. Ramirez Ching   
   
                      IG %       0.3 %      Normal     0.0-0.5    [facility]  
   
                                        Comment on above:   Performed By: #### C  
BC ####  
[facility]  
[address] [place] [address]  
Dr. Ramirez Ching   
   
                      LYMPH #    2.0 103/ul Normal     1.2-3.8    [facility]  
   
                                        Comment on above:   Performed By: #### C  
BC ####  
[facility]  
[address] [place] [address]  
Dr. Ramirez Ching   
   
                                                    Lymphocytes/100 WBC   
(Bld)           28.8 %          Normal          20.5-60.0       [facility]  
   
                                        Comment on above:   Performed By: #### C  
BC ####  
[facility]  
[address] [place] [address]  
Dr. Ramirez Ching   
   
                      MANUAL DIFF REQ NO         Normal                [facility]  
   
                                        Comment on above:   Performed By: #### C  
BC ####  
[facility]  
[address] [place] [address]  
Dr. Ramirez Ching   
   
                                                    MCH (RBC) [Entitic   
mass]           30.8 pg         Normal          26.7-34.0       [facility]  
   
                                        Comment on above:   Performed By: #### C  
BC ####  
[facility]  
[address] [place] [address]  
Dr. Ramirez Ching   
   
                                                    MCHC (RBC)   
[Mass/Vol]      36.6 g/dL       Critically high 29.9-35.2       [facility]  
   
                                        Comment on above:   Performed By: #### C  
BC ####  
[facility]  
[address] [place] [address]  
Dr. Ramirez Ching   
   
                                                    MCV (RBC) [Entitic   
vol]            84.0 fL         Normal          81.0-99.0       [facility]  
   
                                        Comment on above:   Performed By: #### C  
BC ####  
[facility]  
[address] [place] [address]  
Dr. Ramirez Ching   
   
                      MONO #     0.5 103/ul Normal     0.3-0.8    [facility]  
   
                                        Comment on above:   Performed By: #### C  
BC ####  
[facility]  
[address] [place] [address]  
Dr. Ramirez Ching   
   
                                                    Monocytes/100 WBC   
(Bld)           7.7 %           Normal          1.7-12.0        [facility]  
   
                                        Comment on above:   Performed By: #### C  
BC ####  
[facility]  
[address] [place] [address]  
Dr. Ramirez Ching   
   
                      NEUT #     4.0 103/ul Normal     1.4-6.5    [facility]  
   
                                        Comment on above:   Performed By: #### C  
BC ####  
[facility]  
[address] [place] [address]  
Dr. Ramirez Ching   
   
                                                    Neutrophils/100 WBC   
(Bld)           59.5 %          Normal          43.0-75.0       [facility]  
   
                                        Comment on above:   Performed By: #### C  
BC ####  
[facility]  
[address] [place] [address]  
Dr. Ramirez Ching   
   
                                                    Platelet mean volume   
(Bld) [Entitic vol] 9.8 fL          Normal          9.5-13.5        [facility]  
   
                                        Comment on above:   Performed By: #### C  
BC ####  
[facility]  
[address] [place] [address]  
Dr. Ramirez Ching   
   
                      PLT        204 103/ul Normal     150-450    [facility]  
   
                                        Comment on above:   Performed By: #### C  
BC ####  
[facility]  
[address] [place] [address]  
Dr. Ramirez Ching   
   
                      RBC        4.45 106/ul Normal     4.20-5.40  [facility]  
   
                                        Comment on above:   Performed By: #### C  
BC ####  
[facility]  
[address] [place] [address]  
Dr. Ramirez Ching   
   
                      WBC        6.8 103/ul Normal     4.0-11.0   [facility]  
   
                                        Comment on above:   Performed By: #### C  
BC ####  
[facility]  
[address] [place] [address]  
Dr. Ramirez Ching   
   
                                                    PROF CHEM 8 (BAS METB)on    
   
                                                    Anion gap   
[Moles/Vol]     14.8 mmol/L     Normal                          [facility]  
   
                                        Comment on above:   Performed By: #### B  
MP ####  
[facility]  
[address] [place] [address]  
Dr. Ramirez Ching   
   
                      Calcium [Mass/Vol] 9.3 mg/dL  Normal     8.5-10.1   [facility]  
   
                                        Comment on above:   Performed By: #### B  
MP ####  
[facility]  
[address] [place] [address]  
Dr. Ramirez Ching   
   
                      Chloride [Moles/Vol] 101 mmol/L Normal          [facility]  
   
                                        Comment on above:   Performed By: #### B  
MP ####  
[facility]  
[address] [place] [address]  
Dr. Ramirez Ching   
   
                      CO2 [Moles/Vol] 25.0 mmol/L Normal     21.0-32.0  [facility]  
   
                                        Comment on above:   Performed By: #### B  
MP ####  
[facility]  
[address] [place] [address]  
Dr. Ramirez Ching   
   
                                                    Creatinine   
[Mass/Vol]      1.01 mg/dL      Normal          0.55-1.02       [facility]  
   
                                        Comment on above:   Performed By: #### B  
MP ####  
[facility]  
[address] [address]  
Dr. Ramirez Ching   
   
                      EGFR-AF AMERICAN >60        Normal     >=60       [facility]  
   
                                        Comment on above:   Performed By: #### B  
MP ####  
[facility]  
[address] [address]  
Dr. Ramirez Ching   
   
                      EGFR-NON AF AMERICAN 55 mL/min/1.73m2 Critically low >=60   
      [facility]  
   
                                        Comment on above:   Performed By: #### B  
MP ####  
[facility]  
[address] [address]  
Dr. Ramirez Ching   
   
                      Glucose [Mass/Vol] 106 mg/dL  Normal          [facility]  
   
                                        Comment on above:   Performed By: #### B  
MP ####  
[facility]  
[address] [address]  
Dr. Ramirez Ching   
   
                                                    Potassium   
[Moles/Vol]     4.8 mmol/L      Normal          3.5-5.1         [facility]  
   
                                        Comment on above:   Performed By: #### B  
MP ####  
[facility]  
[address] [address]  
Dr. Ramirez Ching   
   
                      Sodium [Moles/Vol] 136 mmol/L Normal     136-145    [facility]  
   
                                        Comment on above:   Performed By: #### B  
MP ####  
[facility]  
[address] [address]  
Dr. Ramirez Ching   
   
                                                    Urea nitrogen   
[Mass/Vol]      24.0 mg/dL      Critically high 7.0-18.0        [facility]  
   
                                        Comment on above:   Performed By: #### B  
MP ####  
[facility]  
[address] [address]  
Dr. Ramirez Ching   
   
                                                    Urea   
nitrogen/Creatinine   
[Mass ratio]    23.8 mg/mg      Normal                          [facility]  
   
                                        Comment on above:   Performed By: #### B  
MP ####  
[facility]  
[address] [address]  
Dr. Ramirez Ching   
   
                                                    VL DUP CAROTID BILATERALon 0  
2020   
   
                                                            [facility]   
Vascular Carotid   
Procedure Patient Name   
MIGUELITO Date of Study   
2020 [facility]   
Date of Birth   
1955 Gender   
Female Age 64 year(s)   
Race   Room   
Number Corporate ID #   
[id number] Patient Acct   
# [id number] MR #   
[id number] Sonographer   
Susan Gurrola   
Accession #   
[id number]   
Interpreting Physician   
Nahum Mason MD   
Referring Nurse   
Referring Physician   
Elsa Franks Practitioner   
Procedure Type of   
Study: Cerebral:   
Carotid, Carotid Scan   
Bilateral. Patient   
Status:Out Patient.   
Comments:INDICATIONS:   
syncope Conclusions   
Summary No   
hemodynamically   
significant stenosis   
noted in the internal   
carotid artery   
bilaterally. Signature   
----------------------  
----------------------  
--------------------   
Electronically signed   
by   
Susan Gurrola(Sonogr  
apher) on 2020   
03:12 PM   
----------------------  
----------------------  
--------------------   
----------------------  
----------------------  
--------------------   
Electronically signed   
by Nahum Mason MD(Interpreting   
physician) on   
2020 03:34 PM   
----------------------  
----------------------  
--------------------   
Findings: Right   
Impression:  Left   
Impression: Mild   
smooth heterogeneous   
plaque in Mild smooth   
heterogeneous plaque   
in the carotid bulb.   
the carotid bulb. Peak   
systolic velocities   
are within Peak   
systolic velocities   
are within normal   
limits. normal limits.   
No spectral broadening   
is noted. No spectral   
broadening is noted.   
Vertebral artery flow   
is antegrade .   
Vertebral artery flow   
is antegrade .   
Velocities are   
measured in cm/s ;   
Diameters are measured   
in cm Carotid Right   
Measurements   
+------------+--------  
+--------+--------+---  
----+------------+----  
---------+ !Location   
!PSV !EDV !Angle !RI   
!%Stenosis !Tortuosity   
!   
+------------+--------  
+--------+--------+---  
----+------------+----  
---------+ !Prox CCA   
!77.55 !11.25 ! !0.85   
! ! !   
+------------+--------  
+--------+--------+---  
----+------------+----  
---------+ !Mid CCA   
!57.48 !15.61 ! !0.73   
! ! !   
+------------+--------  
+--------+--------+---  
----+------------+----  
---------+ !Dist CCA   
!57.48 !17.36 ! !0.7 !   
! !   
+------------+--------  
+--------+--------+---  
----+------------+----  
---------+ !Bulb   
!43.34 !9.89 ! !0.77 !   
! !   
+------------+--------  
+--------+--------+---  
----+------------+----  
---------+ !Prox ICA   
!46.9 !12.74 !  !0.73   
! ! !   
+------------+--------  
+--------+--------+---  
----+------------+----  
---------+ !Mid ICA   
!43.34 !12.74 ! !0.71   
! ! !   
+------------+--------  
+--------+--------+---  
----+------------+----  
---------+ !Dist ICA   
!53.29 !18.43 ! !0.65   
! ! !   
+------------+--------  
+--------+--------+---  
----+------------+----  
---------+ !Prox ECA   
!58.36 !9.51 ! !0.84 !   
! !   
+------------+--------  
+--------+--------+---  
----+------------+----  
---------+ !Vertebral   
!49.73 !14.16 ! !0.72   
! ! !   
+------------+--------  
+--------+--------+---  
----+------------+----  
---------+ - There is   
antegrade vertebral   
flow noted on the   
right side. -   
Additional   
Measurements:ICAPSV/CC  
APSV   
0.69.ICAEDV/CCAEDV   
1.64. Carotid Left   
Measurements   
+------------+--------  
+--------+--------+---  
----+------------+----  
---------+ !Location   
!PSV !EDV !Angle !RI   
!%Stenosis !Tortuosity   
!   
+------------+--------  
+--------+--------+---  
----+------------+----  
---------+ !Prox CCA   
!60.41 !12.74 ! !0.79   
! ! !   
+------------+--------  
+--------+--------+---  
----+------------+----  
---------+ !Mid CCA   
!48.31 !14.16 ! !0.71   
! ! !   
+------------+--------  
+--------+--------+---  
----+------------+----  
---------+ !Dist CCA   
!44.75 !14.16 ! !0.68   
! ! !   
+------------+--------  
+--------+--------+---  
----+------------+----  
---------+ !Bulb   
!44.75 !14.16 ! !0.68   
! ! !   
+------------+--------  
+--------+--------+---  
----+------------+----  
---------+ !Prox ICA   
!44.75 !17 ! !0.62 ! !   
!   
+------------+--------  
+--------+--------+---  
----+------------+----  
---------+ !Mid ICA   
!49.73 !17.71 ! !0.64   
! ! !   
+------------+--------  
+--------+--------+---  
----+------------+----  
---------+ !Dist ICA   
!50.44 !17.71 ! !0.65   
! ! !   
+------------+--------  
+--------+--------+---  
----+------------+----  
---------+ !Prox ECA   
!45.46 !7.04 ! !0.85 !   
! !   
+------------+--------  
+--------+--------+---  
----+------------+----  
---------+ !Vertebral   
!42.61 !11.31 ! !0.73    
! ! !   
+------------+--------  
+--------+--------+---  
----+------------+----  
---------+ - There is   
antegrade vertebral   
flow noted on the left   
side. - Additional   
Measurements:ICAPSV/CC  
APSV   
0.83.ICAEDV/CCAEDV   
1.39.                                                       [facility]   
[place], [place]  
   
                                                            [place], pn Incoming   
Cardio Results From   
[facility] - 2020   
3:35 PM EDT  
[facility]  
Vascular Carotid   
Procedure  
  
Patient Name MIGUELITO   
Date of Study   
2020  
[facility]  
  
Date of Birth   
1955 Gender   
Female  
  
Age 64 year(s) Race   
  
  
Room Number  
  
Corporate ID #   
[id number]  
  
Patient Acct #   
[id number]  
  
MR # [id number]   
Sonographer   
Susan Gurrola  
  
Accession #   
[id number]   
Interpreting Physician   
Nahum Mason MD  
  
Referring Nurse   
Referring Physician   
Elsa Franks  
Practitioner  
  
Procedure  
Type of Study:  
  
Cerebral: Carotid,   
Carotid Scan   
Bilateral.  
  
Patient Status:Out   
Patient.  
  
Comments:INDICATIONS:   
syncope  
  
Conclusions  
  
Summary  
  
No hemodynamically   
significant stenosis   
noted in the internal   
carotid  
artery bilaterally.  
  
Signature  
  
----------------------  
----------------------  
--------------------  
Electronically signed   
by   
Susan Gurrola(Sonogr  
apher) on  
2020 03:12 PM  
----------------------  
----------------------  
--------------------  
  
----------------------  
----------------------  
--------------------  
Electronically signed   
by Nahum Mason MD(Interpreting  
physician) on   
2020 03:34 PM  
----------------------  
----------------------  
--------------------  
  
Findings:  
  
Right Impression: Left   
Impression:  
Mild smooth   
heterogeneous plaque   
in Mild smooth   
heterogeneous plaque   
in  
the carotid bulb. the   
carotid bulb.  
Peak systolic   
velocities are within   
Peak systolic   
velocities are within  
normal limits. normal   
limits.  
No spectral broadening   
is noted. No spectral   
broadening is noted.  
Vertebral artery flow   
is antegrade .   
Vertebral artery flow   
is antegrade .  
  
Velocities are   
measured in cm/s ;   
Diameters are measured   
in cm  
  
Carotid Right   
Measurements  
+------------+--------  
+--------+--------+---  
----+------------+----  
--------- +  
!Location !PSV !EDV   
!Angle !RI !%Stenosis   
!Tortuosity !  
+------------+--------  
+--------+--------+---  
----+------------+----  
--------- +  
!Prox CCA !77.55   
!11.25 ! !0.85 ! ! !  
+------------+--------  
+--------+--------+---  
----+------------+----  
--------- +  
!Mid CCA !57.48 !15.61   
! !0.73 ! ! !  
+------------+--------  
+--------+--------+---  
----+------------+----  
--------- +  
!Dist CCA !57.48   
!17.36 ! !0.7 ! ! !  
+------------+--------  
+--------+--------+---  
----+------------+----  
--------- +  
!Bulb !43.34 !9.89 !   
!0.77 ! ! !  
+------------+--------  
+--------+--------+---  
----+------------+----  
--------- +  
!Prox ICA !46.9 !12.74   
! !0.73 ! ! !  
+------------+--------  
+--------+--------+---  
----+------------+----  
--------- +  
!Mid ICA !43.34 !12.74   
! !0.71 ! ! !  
+------------+--------  
+--------+--------+---  
----+------------+----  
--------- +  
!Dist ICA !53.29   
!18.43 ! !0.65 ! ! !  
+------------+--------  
+--------+--------+---  
----+------------+----  
--------- +  
!Prox ECA !58.36 !9.51   
! !0.84 ! ! !  
+------------+--------  
+--------+--------+---  
----+------------+----  
--------- +  
!Vertebral !49.73   
!14.16 ! !0.72 ! ! !  
+------------+--------  
+--------+--------+---  
----+------------+----  
--------- +  
  
- There is antegrade   
vertebral flow noted   
on the right side.  
  
- Additional   
Measurements:ICAPSV/CC  
APSV   
0.69.ICAEDV/CCAEDV   
1.64.  
  
Carotid Left   
Measurements  
+------------+--------  
+--------+--------+---  
----+------------+----  
--------- +  
!Location !PSV !EDV   
!Angle !RI !%Stenosis   
!Tortuosity !  
+------------+--------  
+--------+--------+---  
----+------------+----  
--------- +  
!Prox CCA !60.41   
!12.74 ! !0.79 ! ! !  
+------------+--------  
+--------+--------+---  
----+------------+----  
--------- +  
!Mid CCA !48.31 !14.16   
! !0.71 ! ! !  
+------------+--------  
+--------+--------+---  
----+------------+----  
--------- +  
!Dist CCA !44.75   
!14.16 ! !0.68 ! ! !  
+------------+--------  
+--------+--------+---  
----+------------+----  
--------- +  
!Bulb !44.75 !14.16 !   
!0.68 ! ! !  
+------------+--------  
+--------+--------+---  
----+------------+----  
--------- +  
!Prox ICA !44.75 !17 !   
!0.62 ! ! !  
+------------+--------  
+--------+--------+---  
----+------------+----  
--------- +  
!Mid ICA !49.73 !17.71   
! !0.64 ! ! !  
+------------+--------  
+--------+--------+---  
----+------------+----  
--------- +  
!Dist ICA !50.44   
!17.71 ! !0.65 ! ! !  
+------------+--------  
+--------+--------+---  
----+------------+----  
--------- +  
!Prox ECA !45.46 !7.04   
! !0.85 ! ! !  
+------------+--------  
+--------+--------+---  
----+------------+----  
--------- +  
!Vertebral !42.61   
!11.31 ! !0.73 ! ! !  
+------------+--------  
+--------+--------+---  
----+------------+----  
--------- +  
  
- There is antegrade   
vertebral flow noted   
on the left side.  
  
- Additional   
Measurements:ICAPSV/CC  
APSV   
0.83.ICAEDV/CCAEDV   
1.39.                                                       [place]  
   
                                                    ANION GAPon 2020   
   
                                                    Anion gap   
[Moles/Vol]     10.0 mmol/L     Normal          8.0-16.0        [facility]  
   
                                        Comment on above:   Result Comment: ANIO  
N GAP = Sodium -(Chloride + CO2)   
   
                                                            Performed By: #### B  
MPX, ANION, EGFR1, CBCND ####[employer] [place] [address]   
   
                                                    Anion Gapon 2020   
   
                                                    Anion gap   
[Moles/Vol]     10.0 mmol/L                     8 - 16 meq/L    [place]  
   
                                        Comment on above:   ANION GAP = Sodium -  
(Chloride + CO2)  
Performed at [facility] [address] [place]   
[address]  
  
   
   
                                                    BASIC METABOL PANELon 2020   
   
                      Calcium [Mass/Vol] 8.4 mg/dL  Low        8.5-10.5   [facility]  
   
                                        Comment on above:   Performed By: #### B  
MPX, ANION, EGFR1, CBCND ####[facility]   
Medical [address] [place] [address]   
   
                      Chloride [Moles/Vol] 101 mmol/L Normal          [facility]  
   
                                        Comment on above:   Performed By: #### B  
MPX, ANION, EGFR1, CBCND ####[facility]   
Medical [address] [place] [address]   
   
                      CO2 [Moles/Vol] 23 mmol/L  Normal     23-33      [facility]  
   
                                        Comment on above:   Performed By: #### B  
MPX, ANION, EGFR1, CBCND ####[facility]   
Medical [address] [place] [address]   
   
                                                    Creatinine   
[Mass/Vol]      0.7 mg/dL       Normal          0.4-1.2         [facility]  
   
                                        Comment on above:   Performed By: #### B  
MPX, ANION, EGFR1, CBCND ####[facility]   
Medical [address] [place] [address]   
   
                      Glucose [Mass/Vol] 108 mg/dL  Normal          [facility]  
   
                                        Comment on above:   Performed By: #### B  
MPX, ANION, EGFR1, CBCND ####[facility]   
Medical [address] [place] [address]   
   
                                                    POTASSIUM WITH   
REFLEX MG       4.2 meq/L       Normal          3.5-5.2         [facility]  
   
                                        Comment on above:   Performed By: #### B  
MPX, ANION, EGFR1, CBCND ####[facility]   
Medical [address] [place] [address]   
   
                      Sodium [Moles/Vol] 134 mmol/L Low        135-145    [facility]  
   
                                        Comment on above:   Performed By: #### B  
MPX, ANION, EGFR1, CBCND ####[facility]   
Medical [address] [place] [address]   
   
                                                    Urea nitrogen   
[Mass/Vol]      20 mg/dL        Normal          7-22            [facility]  
   
                                        Comment on above:   Performed By: #### B  
MPX, ANION, EGFR1, CBCND ####[facility]   
Medical [address] [place] [address]   
   
                                                    Basic Metabolic Panel w/ Ref  
anai to MGon 2020   
   
                          Calcium [Mass/Vol] 8.4 mg/dL    Low          8.5 - 10.  
5   
mg/dL                                   [place]  
   
                                        Comment on above:   Performed at [facility]  
ion [facility] [address] [place]   
[address]   
   
                      Chloride [Moles/Vol] 101 mmol/L            98 - 111 meq/L   
[place]  
   
                      CO2 [Moles/Vol] 23 mmol/L             23 - 33 meq/L [place]  
   
                                                    Creatinine   
[Mass/Vol]          0.7 mg/dL                               0.4 - 1.2   
mg/dL                                   [place]  
   
                      Glucose [Mass/Vol] 108 mg/dL             70 - 108 mg/dL [place]  
   
                                                    Potassium   
[Moles/Vol]         4.2 mmol/L                              3.5 - 5.2   
meq/L                                   [place]  
   
                          Sodium [Moles/Vol] 134 mmol/L   Low          135 - 145  
   
meq/L                                   [place]  
   
                                                    Urea nitrogen   
[Mass/Vol]      20 mg/dL                        7 - 22 mg/dL    [place]  
   
                                                    CBCon 2020   
   
                                                    Erythrocyte   
distribution width   
(RBC) [Ratio]   13.3 %                          11.5 - 14.5 %   [place]  
   
                                                    Hematocrit (Bld)   
[Volume fraction] 35.4 %          Low             37 - 47 %       [place]  
   
                                                    Hemoglobin (Bld)   
[Mass/Vol]      11.4 g/dL       Low                             [place]  
   
                                                    Interpretation and   
review of laboratory   
results         Abnormal                                        [place]  
   
                                                    MCH (RBC) [Entitic   
mass]           29.7 pg                         26 - 33 pg      [place]  
   
                                                    MCHC (RBC)   
[Mass/Vol]      32.2 g/dL                                       [place]  
   
                                                    MCV (RBC) [Entitic   
vol]            92.2 fL                         81 - 99 fL      [place]  
   
                                                    Platelet mean volume   
(Bld) [Entitic vol] 11.9 fL                         9.4 - 12.4 fL   [place]  
   
                                        Comment on above:   Performed at [facility] [address] [place]   
[address]   
   
                                                    Platelets (Bld)   
[#/Vol]         178 10*3/uL                                     [place]  
   
                      RBC (Bld) [#/Vol] 3.84 10*6/uL Low                   [place]  
   
                      RDW-SD     45.1 fL    High       35 - 45 fL [place]  
   
                      WBC (Bld) [#/Vol] 8.4 10*3/uL                       [place]  
   
                                                    CBC NO DIFFERENTIALon 2020   
   
                                                    Erythrocyte   
distribution width   
(RBC) [Ratio]   13.3 %          Normal          11.5-14.5       [facility]  
   
                                        Comment on above:   Performed By: #### B  
MPX, ANION, EGFR1, CBCND ####[facility] [address] [place] [address]   
   
                                                    Hematocrit (Bld)   
[Volume fraction] 35.4 %          Low             37.0-47.0       [facility]  
   
                                        Comment on above:   Performed By: #### B  
MPX, ANION, EGFR1, CBCND ####[facility] [address] [place] [address]   
   
                                                    Hemoglobin (Bld)   
[Mass/Vol]      11.4 gm/dl      Low             12.0-16.0       [facility]  
   
                                        Comment on above:   Performed By: #### B  
MPX, ANION, EGFR1, CBCND ####[facility] [address] [place] [address]   
   
                                                    MCH (RBC) [Entitic   
mass]           29.7 pg         Normal          26.0-33.0       [facility]  
   
                                        Comment on above:   Performed By: #### B  
MPX, ANION, EGFR1, CBCND ####[facility] [address] [place] [address]   
   
                                                    MCHC (RBC)   
[Mass/Vol]      32.2 gm/dl      Normal          32.2-35.5       [facility]  
   
                                        Comment on above:   Performed By: #### B  
MPX, ANION, EGFR1, CBCND ####[facility] [address] [place] [address]   
   
                                                    MCV (RBC) [Entitic   
vol]            92.2 fL         Normal          81.0-99.0       [facility]  
   
                                        Comment on above:   Performed By: #### B  
MPX, ANION, EGFR1, CBCND ####[facility] [address] [place] [address]   
   
                                                    Platelet mean volume   
(Bld) [Entitic vol] 11.9 fL         Normal          9.4-12.4        [facility]  
   
                                        Comment on above:   Performed By: #### B  
MPX, ANION, EGFR1, CBCND ####[facility]   
Medical [address] [place] [address]   
   
                                                    Platelets (Bld)   
[#/Vol]         178 thou/mm3    Normal          130-400         [facility]  
   
                                        Comment on above:   Performed By: #### B  
MPX, ANION, EGFR1, CBCND ####[facility] [address] [place] [address]   
   
                      RBC (Bld) [#/Vol] 3.84 mill/mm3 Low        4.20-5.40  [facility]  
   
                                        Comment on above:   Performed By: #### B  
MPX, ANION, EGFR1, CBCND ####[facility]   
Medical [address] [place] [address]   
   
                      RDW-SD     45.1 fL    High       35.0-45.0  [facility]  
   
                                        Comment on above:   Performed By: #### B  
MPX, ANION, EGFR1, CBCND ####[facility]   
Medical [address] [place] [address]   
   
                      WBC (Bld) [#/Vol] 8.4 thou/mm3 Normal     4.8-10.8   [facility]  
   
                                        Comment on above:   Performed By: #### B  
MPX, ANION, EGFR1, CBCND ####[facility]   
Medical [address] [place] [address]   
   
                                                    ECHOCARDIOGRAM COMPLETE 2D W  
 DOPPLER W COLORon 2020   
   
                                                    ECHOCARDIOGRAM   
COMPLETE 2D W   
DOPPLER W COLOR                         Transthoracic   
Echocardiography   
Report (TTE)  
Demographics  
  
Patient Name MIGUELITO PACHECO Gender Female  
A  
  
MR # [id number] Race   
  
  
Ethnicity  
  
Account # [id number]   
Room Number 0003  
  
Accession [id number]   
Date of Study   
2020  
Number  
  
Date of Birth   
1955 Referring   
Physician Gustabo ARMENDARIZ MD  
  
Age 64 year(s)   
Sonographer Unique Leo  
[facility]  
  
Interpreting Echo   
reader of the week  
Physician Simeon Oh MD  
  
Procedure  
Type of Study  
  
TTE   
procedure:ECHOCARDIOGR  
AM COMPLETE 2D W   
DOPPLER W COLOR.  
  
Procedure Date  
Date: 2020   
Start: 10:50 AM  
Study Location:   
Bedside  
Technical Quality:   
Limited visualization   
due to poor acoustical   
window.  
Indications:Syncope.  
Additional Medical   
History:Smoker,   
history of pericardial   
effusion, anemia,  
seizures  
Patient Status:   
Routine  
Height: 66.14 inches   
Weight: 218.26 pounds   
BSA: 2.08 m^2 BMI:   
35.08 kg/m^2  
BP: 144/75 mmHg  
Conclusions  
  
Summary  
Ejection fraction is   
visually estimated at   
55%.  
Overall left   
ventricular function   
is normal.  
  
Signature  
  
----------------------  
----------------------  
--------------------  
Electronically signed   
by Simeon Oh MD (Interpreting  
physician) on   
2020 at 08:00 PM  
----------------------  
----------------------  
--------------------  
  
Findings  
  
Mitral Valve  
The mitral valve   
structure was normal   
with normal leaflet   
separation.  
DOPPLER: The   
transmitral velocity   
was within the normal   
range with no  
evidence for mitral   
stenosis. There was no   
evidence of mitral  
regurgitation.  
  
Aortic Valve  
The aortic valve was   
trileaflet with normal   
thickness and cuspal  
separation. DOPPLER:   
Transaortic velocity   
was within the normal   
range with  
no evidence of aortic   
stenosis. There was no   
evidence of aortic  
regurgitation.  
  
Tricuspid Valve  
The tricuspid valve   
structure was normal   
with normal leaflet   
separation.  
DOPPLER: There was no   
evidence of tricuspid   
stenosis. There was no  
evidence of tricuspid   
regurgitation.  
  
Pulmonic Valve  
The pulmonic valve was   
not well visualized .  
Trivial pulmonic   
regurgitation   
visualized.  
  
Left Atrium  
Left atrial size was   
normal.  
  
Left Ventricle  
Ejection fraction is   
visually estimated at   
55%.  
Overall left   
ventricular function   
is normal.  
  
Right Atrium  
Right atrial size was   
normal.  
  
Right Ventricle  
The right ventricular   
size was normal with   
normal systolic   
function and  
wall thickness.  
  
Pericardial Effusion  
The pericardium was   
normal in appearance   
with no evidence of a   
pericardial  
effusion.  
  
Pleural Effusion  
No evidence of pleural   
effusion.  
  
Aorta / Great Vessels  
-Aortic root dimension   
within normal limits.  
-The Pulmonary artery   
is within normal   
limits.  
-IVC size is within   
normal limits with   
normal respiratory   
phasic changes.  
  
M-Mode/2D Measurements   
AND Calculations  
  
LV Diastolic LV   
Systolic Dimension: AV   
Cusp Separation: 1.9   
cmLA  
Dimension: 4.5 3.2 cm   
Dimension: 3.5 cmAO   
Root  
cm LV Volume   
Diastolic: 92.4   
Dimension: 3.4 cmLA   
Area: 16.7  
LV FS:28.9 % ml cm^2  
LV PW LV Volume   
Systolic: 41 ml  
Diastolic: 0.8 LV   
EDV/LV EDV Index: 92.4  
cm ml/44 m^2LV ESV/LV   
ESV  
Septum Index: 41 ml/20   
m^2 RV Diastolic   
Dimension: 2.9 cm  
Diastolic: 0.8 EF   
Calculated: 55.6 %  
cm LA/Aorta: 1.03  
Ascending Aorta: 3.4   
cm  
LA volume/Index: 40.1   
ml /19m^2  
  
Doppler Measurements   
AND Calculations  
  
MV Peak E-Wave: 78.4   
AV Peak Velocity: 131   
LVOT Peak Velocity:   
112 cm/s  
cm/s cm/s LVOT Mean   
Velocity: 73 cm/s  
MV Peak A-Wave: 60.8   
AV Peak Gradient: 6.86   
LVOT Peak Gradient: 5  
cm/s mmHg mmHgLVOT   
Mean Gradient: 3  
MV E/A Ratio: 1.29 AV   
Mean Velocity: 85.5   
mmHg  
MV Peak Gradient: cm/s  
2.46 mmHg AV Mean   
Gradient: 3 TV Peak   
E-Wave: 61.3 cm/s  
mmHg TV Peak A-Wave:   
32.6 cm/s  
MV Deceleration Time:   
AV VTI: 29.7 cm  
162 msec TV Peak   
Gradient: 1.5 mmHg  
MV P1/2t: 47 msec  
MVA by PHT:4.68 cm^2   
LVOT VTI: 20.2 cm PV   
Peak Velocity: 55.3   
cm/s  
IVRT: 85 msec PV Peak   
Gradient: 1.22 mmHg  
MV E' Septal  
Velocity: 7 cm/s  
MV A' Septal AV DVI   
(VTI): 0.68AV  
Velocity: 7.6 cm/s DVI   
(Vmax):0.85  
MV E' Lateral  
Velocity: 10 cm/s  
MV A' Lateral  
Velocity: 6.7 cm/s  
E/E' septal: 11.2  
E/E' lateral: 7.84  
MR Velocity: 334 cm/s  
  
[email or web address]  
[id number]  
[id number]  
[id number]  
[id number]  
%3d%3d              Normal                                  [facility]  
   
                                                    GFR, ESTIMATEDon 2020   
   
                                                    GFR/1.73 sq   
M.predicted MDRD   
(S/P/Bld) [Vol   
rate/Area]      85 ml/min/1.73m2 Abnormal                        [facility]  
   
                                        Comment on above:   Result Comment: Yolig  
e Description GFR, ml/min/1.73 m2  
- At increased risk > or = 60 (with chronic  
kidney disease risk factors)  
1 Normal or increased GFR > or = 90  
2 Mildly or decreased GFR 60 - 89  
3 Moderately decreased GFR 30 - 59  
4 Severely decreased GFR 15 - 29  
5 Kidney failure <15 (or dialysis)  
Estimated GFR calculated using abbreviated MDRD formula as  
recommended by [employer]. Calculation based  
upon serum creatinine and adjusted for age, gender & race.  
Kristie. Internal Med., Vol. 139 (2) pg 137-147.   
   
                                                            Performed By: #### B  
MPX, ANION, EGFR1, CBCND ####[employer] [address] [place] [address]   
   
                                                    Glomerular Filtration Rate,   
Estimatedon 2020   
   
                      Est, Glom Filt Rate 85         Abnormal   ml/min/1.73m2 [facility] [place]  
   
                                        Comment on above:   Stage Description GF  
R, ml/min/1.73 m2  
- At increased risk > or = 60 (with chronic  
kidney disease risk factors)  
1 Normal or increased GFR > or = 90  
2 Mildly or decreased GFR 60 - 89  
3 Moderately decreased GFR 30 - 59  
4 Severely decreased GFR 15 - 29  
5 Kidney failure <15 (or dialysis)  
Estimated GFR calculated using abbreviated MDRD formula as  
recommended by [employer]. Calculation based  
upon serum creatinine and adjusted for age, gender & race.  
Kristie. Internal Med., Vol. 139 (2) pg 137-147.  
Performed at [facility] [address] [place]   
[address]  
  
   
   
                                                    LACTIC ACIDon 2020   
   
                      Lactate [Moles/Vol] 1.0 mmol/L Normal     0.5-2.2    [facility]  
   
                                        Comment on above:   Performed By: #### L  
ACAP ####[facility]   
[address]   
[place] [address]   
   
                                                    Lactic acid, plasmaon 2020   
   
                          Lactate [Moles/Vol] 1 mmol/L                  0.5 - 2.  
2   
mmol/L                                  [place]  
   
                                        Comment on above:   Performed at [facility]  
[facility] [address] [place]   
[address]   
   
                                                    Otheron 2020   
   
                                                    Interpretation and   
review of laboratory   
results         Abnormal                                        [place]  
   
                                                    XR RIBS RIGHT (2 VIEWS)on    
   
                                                    XR RIBS RIGHT (2   
VIEWS)                                  RIGHT RIBS /4 VIEWS:  
CLINICAL INFORMATION:   
pain on right side mid   
chest an ribs,   
received CPR recently  
COMPARISON: No prior   
study.  
TECHNIQUE: AP and   
oblique views of the   
right ribs were   
obtained. Four views,   
in all, were obtained.  
FINDINGS: No acute rib   
fracture is seen.   
There is no pleural or   
reactive change. No   
pneumothorax is seen.   
Suggestion of mild   
atelectatic/fibrotic   
stranding right lung   
base.  
IMPRESSION:  
1. Normal right ribs.  
2. Suggestion of mild   
atelectatic/fibrotic   
stranding right lung   
base.  
**This report has been   
created using voice   
recognition software.   
It may contain minor   
errors which are   
inherent in voice   
recognition   
technology.**  
Final report   
electronically signed   
by Dr. Henrik Macias   
on 2020 2:00 PM  
Interpreted by:  
Henrik Macias MD  
Signed by:  
Henrik Macias MD  
20  
Final result        Normal                                  [facility]  
   
                                                            Yossi, Wcoh Incoming   
Radiant Results From   
[employer] -   
2020 2:02 PM EDT   
RIGHT RIBS /4 VIEWS:  
  
CLINICAL INFORMATION:   
pain on right side mid   
chest an ribs,   
received CPR recently  
  
COMPARISON: No prior   
study.  
  
TECHNIQUE: AP and   
oblique views of the   
right ribs were   
obtained. Four views,   
in all, were obtained.  
  
FINDINGS: No acute rib   
fracture is seen.   
There is no pleural or   
reactive change. No   
pneumothorax is seen.   
Suggestion of mild   
atelectatic/fibrotic   
stranding right lung   
base.  
  
IMPRESSION:  
1. Normal right ribs.  
2. Suggestion of mild   
atelectatic/fibrotic   
stranding right lung   
base.  
  
  
  
**This report has been   
created using voice   
recognition software.   
It may contain minor   
errors which are   
inherent in voice   
recognition   
technology.**  
  
Final report   
electronically signed   
by Dr. Henrik aMcias   
on 2020 2:00 PM                                         [place]  
   
                                                            RIGHT RIBS /4 VIEWS:  
   
CLINICAL INFORMATION:   
pain on right side mid   
chest an ribs,   
received CPR recently   
COMPARISON: No prior   
study. TECHNIQUE: AP   
and oblique views of   
the right ribs were   
obtained. Four views,   
in all, were obtained.   
FINDINGS: No acute rib   
fracture is seen.   
There is no pleural or   
reactive change. No   
pneumothorax is seen.   
Suggestion of mild   
atelectatic/fibrotic   
stranding right lung   
base.                                                       [place]  
   
                                                            1. Normal right ribs  
.   
2. Suggestion of mild   
atelectatic/fibrotic   
stranding right lung   
base. **This report   
has been created using   
voice recognition   
software. It may   
contain minor errors   
which are inherent in   
voice recognition   
technology.** Final   
report electronically   
signed by Dr. Henrik Macias on 2020   
2:00 PM                                                     [place]  
   
                                                    ANION GAPon 2020   
   
                                                    Anion gap   
[Moles/Vol]     15.0 mmol/L     Normal          8.0-16.0        [facility]  
   
                                        Comment on above:   Result Comment: ANIO  
N GAP = Sodium -(Chloride + CO2)   
   
                                                            Performed By: #### C  
BCND, BMP, ANION, EGFR1 ####  
[facility] [employer]  
[address] [place] [id number]   
   
                                                    APTTon 2020   
   
                                                    aPTT Coag (Bld)   
[Time]          28.0 s                                          [place]  
   
                                        Comment on above:     
IV Heparin Therapy Range:  
62.0-94.0  
  
  
   
   
                                                    Anion Gapon 2020   
   
                                                    Anion gap   
[Moles/Vol]     15.0 mmol/L                     8 - 16 meq/L    [place]  
   
                                        Comment on above:   ANION GAP = Sodium -  
(Chloride + CO2)  
Performed at [facility] [address] [place] [address]   
[id number]  
  
   
   
                                                    BASIC METABOL PANELon 2020   
   
                      Calcium [Mass/Vol] 9.4 mg/dL  Normal     8.5-10.5   [facility]  
   
                                        Comment on above:   Performed By: #### C  
BCND, BMP, ANION, EGFR1 ####  
[facility]  
[address] [place] [address]   
   
                      Chloride [Moles/Vol] 97 mmol/L  Low             [facility]  
   
                                        Comment on above:   Performed By: #### C  
BCND, BMP, ANION, EGFR1 ####  
[facility]  
[address] [place] [address]   
   
                      CO2 [Moles/Vol] 22 mmol/L  Low        23-33      [facility]  
   
                                        Comment on above:   Performed By: #### C  
BCND, BMP, ANION, EGFR1 ####  
[facility]  
[address] [place] [address]   
   
                                                    Creatinine   
[Mass/Vol]      0.9 mg/dL       Normal          0.4-1.2         [facility]  
   
                                        Comment on above:   Performed By: #### C  
BCND, BMP, ANION, EGFR1 ####  
[facility]  
[address] [place] [address]   
   
                      Glucose [Mass/Vol] 163 mg/dL  High            [facility]  
   
                                        Comment on above:   Performed By: #### C  
BCND, BMP, ANION, EGFR1 ####  
[facility]  
[address] [place] [address]   
   
                                                    Potassium   
[Moles/Vol]     4.2 mmol/L      Normal          3.5-5.2         [facility]  
   
                                        Comment on above:   Performed By: #### C  
BCND, BMP, ANION, EGFR1 ####  
[facility]  
[address] [place] [address]   
   
                      Sodium [Moles/Vol] 134 mmol/L Low        135-145    [facility]  
   
                                        Comment on above:   Performed By: #### C  
BCND, BMP, ANION, EGFR1 ####  
[facility]  
[address] [place] [address]   
   
                                                    Urea nitrogen   
[Mass/Vol]      17 mg/dL        Normal          7-22            [facility]  
   
                                        Comment on above:   Performed By: #### C  
BCND, BMP, ANION, EGFR1 ####  
[facility]  
[address] [place] [address]   
   
                                                    Basic Metabolic Panelon 06-   
   
                          Calcium [Mass/Vol] 9.4 mg/dL                 8.5 - 10.  
5   
mg/dL                                   [facility] [place]  
   
                                        Comment on above:   Performed at [facility] [address] [place]   
[address]   
   
                      Chloride [Moles/Vol] 97 mmol/L  Low        98 - 111 meq/L   
[place]  
   
                      CO2 [Moles/Vol] 22 mmol/L  Low        23 - 33 meq/L [place]  
   
                                                    Creatinine   
[Mass/Vol]          0.9 mg/dL                               0.4 - 1.2   
mg/dL                                   [place]  
   
                      Glucose [Mass/Vol] 163 mg/dL  High       70 - 108 mg/dL [place]  
   
                                                    Potassium   
[Moles/Vol]         4.2 mmol/L                              3.5 - 5.2   
meq/L                                   [place]  
   
                          Sodium [Moles/Vol] 134 mmol/L   Low          135 - 145  
   
meq/L                                   [place]  
   
                                                    Urea nitrogen   
[Mass/Vol]      17 mg/dL                        7 - 22 mg/dL    [place]  
   
                                                    Basic Metabolic Panel w/ Ref  
anai to MGon 2020   
   
                                                    Anion gap   
[Moles/Vol]     14 mmol/L                       9 - 17 mmol/L   [place]  
   
                      Bun/Cre Ratio 19                               [place]  
   
                          Calcium [Mass/Vol] 8.7 mg/dL                 8.6 - 10.  
4   
mg/dL                                   [place]  
   
                          Chloride [Moles/Vol] 98 mmol/L                 98 - 10  
7   
mmol/L                                  [place]  
   
                      CO2 [Moles/Vol] 25 mmol/L             20 - 31 mmol/L [place]  
   
                                                    Creatinine   
[Mass/Vol]          1.04 mg/dL          High                0.5 - 0.9   
mg/dL                                   [place]  
   
                      GFR  >60                   >60 mL/min [place]  
   
                                                    GFR Non-   
American        54 mL/min       Low             >60             [place]  
   
                      Glucose [Mass/Vol] 178 mg/dL  High       70 - 99 mg/dL [place]  
   
                                                    Potassium   
[Moles/Vol]         4.0 mmol/L                              3.7 - 5.3   
mmol/L                                  [place]  
   
                          Sodium [Moles/Vol] 137 mmol/L                135 - 144  
   
mmol/L                                  [place]  
   
                                                    Urea nitrogen   
[Mass/Vol]      20 mg/dL                        8 - 23 mg/dL    [place]  
   
                                                    Brain Natriuretic Peptideon   
2020   
   
                                                    Natriuretic peptide   
B (Bld) [Mass/Vol] 285 pg/mL                       <300            [place]  
   
                                        Comment on above:   Pro-BNP results juan miguel  
ot be compared to BNP results.   
   
                                                    Natriuretic peptide   
B (Bld) [Mass/Vol]                      Pro-BNP Reference   
Range:                                                      [place]  
   
                                        Comment on above:     
Rule Out: <300  
  
Grey Zone:  
Age <50 300-450  
Age 50-75 300-900  
Age >75 300-1800  
Usually represents mild to moderate HF but other cardiopulmonary   
causes cannot be ruled out.  
  
Rule In:  
Age <50 >450  
Age 50-75 >900  
Age >75 >1800  
  
   
   
                                                    CBCon 2020   
   
                                                    Erythrocyte   
distribution width   
(RBC) [Ratio]   15.6 %          High            11.5 - 14.5 %   [place]  
   
                                                    Hematocrit (Bld)   
[Volume fraction] 37.9 %                          37 - 47 %       [place]  
   
                                                    Hemoglobin (Bld)   
[Mass/Vol]      13.8 g/dL                                       [place]  
   
                                                    Interpretation and   
review of laboratory   
results         Abnormal                                        [place]  
   
                                                    MCH (RBC) [Entitic   
mass]           35.3 pg         High            26 - 33 pg      [place]  
   
                                                    MCHC (RBC)   
[Mass/Vol]      36.4 g/dL       High                            [place]  
   
                                                    MCV (RBC) [Entitic   
vol]            96.9 fL                         81 - 99 fL      [place]  
   
                                                    Platelet mean volume   
(Bld) [Entitic vol] 11.1 fL                         9.4 - 12.4 fL   [place]  
   
                                        Comment on above:   Performed at [facility] [address] [place]   
[address]   
   
                                                    Platelets (Bld)   
[#/Vol]         200 10*3/uL                                     [place]  
   
                      RBC (Bld) [#/Vol] 3.91 10*6/uL Low                   [place]  
   
                      RDW-SD     45.1 fL    High       35 - 45 fL [place]  
   
                      WBC (Bld) [#/Vol] 7.8 10*3/uL                       [place]  
   
                                                    CBC NO DIFFERENTIALon 2020   
   
                                                    Erythrocyte   
distribution width   
(RBC) [Ratio]   15.6 %          High            11.5-14.5       [facility]  
   
                                        Comment on above:   Performed By: #### C  
BCND, BMP, ANION, EGFR1 ####  
[facility] [address] [address]   
   
                                                    Hematocrit (Bld)   
[Volume fraction] 37.9 %          Normal          37.0-47.0       [facility]  
   
                                        Comment on above:   Performed By: #### C  
BCND, BMP, ANION, EGFR1 ####  
[facility]  
[address] [place] [address]   
   
                                                    Hemoglobin (Bld)   
[Mass/Vol]      13.8 gm/dl      Normal          12.0-16.0       [facility]  
   
                                        Comment on above:   Performed By: #### C  
BCND, BMP, ANION, EGFR1 ####  
[place] [address]   
   
                                                    MCH (RBC) [Entitic   
mass]           35.3 pg         High            26.0-33.0       [facility]  
   
                                        Comment on above:   Performed By: #### C  
BCND, BMP, ANION, EGFR1 ####  
[place] [address]   
   
                                                    MCHC (RBC)   
[Mass/Vol]      36.4 gm/dl      High            32.2-35.5       [facility]  
   
                                        Comment on above:   Performed By: #### C  
BCND, BMP, ANION, EGFR1 ####  
[address] [address]   
   
                                                    MCV (RBC) [Entitic   
vol]            96.9 fL         Normal          81.0-99.0       [facility]  
   
                                        Comment on above:   Performed By: #### C  
BCND, BMP, ANION, EGFR1 ####  
[address] [address]   
   
                                                    Platelet mean volume   
(Bld) [Entitic vol] 11.1 fL         Normal          9.4-12.4        [facility]  
   
                                        Comment on above:   Performed By: #### C  
BCND, BMP, ANION, EGFR1 ####  
[address] [address]   
   
                                                    Platelets (Bld)   
[#/Vol]         200 thou/mm3    Normal          130-400         [facility]  
   
                                        Comment on above:   Performed By: #### C  
BCND, BMP, ANION, EGFR1 ####  
[address] [address]   
   
                      RBC (Bld) [#/Vol] 3.91 mill/mm3 Low        4.20-5.40  [facility]  
   
                                        Comment on above:   Performed By: #### C  
BCND, BMP, ANION, EGFR1 ####  
[facility]  
[address] [place] [address]   
   
                      RDW-SD     45.1 fL    High       35.0-45.0  [facility]  
   
                                        Comment on above:   Performed By: #### C  
BCND, BMP, ANION, EGFR1 ####  
[facility]  
[address] [place] [address]   
   
                      WBC (Bld) [#/Vol] 7.8 thou/mm3 Normal     4.8-10.8   [facility]  
   
                                        Comment on above:   Performed By: #### C  
BCND, BMP, ANION, EGFR1 ####  
[employer]  
[address] [place] [address]   
   
                                                    CKon 2020   
   
                                                    CK [Catalytic   
activity/Vol]   109 U/L         Normal                    [facility]  
   
                                        Comment on above:   Performed By: #### C  
K ####[facility] [id number]   
[place] [address]   
   
                      Total CK   109 U/L               30 - 135 U/L [place]  
   
                                        Comment on above:   Performed at [facility] [address] [place] [address]   
   
                                                    CT head without contraston 0  
2020   
   
                                                            No acute intracrania  
l   
abnormality.                                                [place]  
   
                                                            Yossi, M[facility] Incoming   
Radiant Results From   
[employer] -   
2020 1:12 AM EDT   
EXAMINATION:  
CT OF THE HEAD WITHOUT   
CONTRAST 2020   
12:28 am  
  
TECHNIQUE:  
CT of the head was   
performed without the   
administration of   
intravenous  
contrast. Dose   
modulation, iterative   
reconstruction, and/or   
weight based  
adjustment of the   
mA/kV was utilized to   
reduce the radiation   
dose to as low  
as reasonably   
achievable.  
  
COMPARISON:  
None.  
  
HISTORY:  
ORDERING SYSTEM   
PROVIDED HISTORY:   
Syncope, Head trauma,   
Unresponsive for 10  
minutes.  
TECHNOLOGIST PROVIDED   
HISTORY:  
ALtered mental status  
  
Syncope, Head trauma,   
Unresponsive for 10   
minutes.  
  
FINDINGS:  
CT HEAD:  
  
BRAIN/VENTRICLES:   
There is no acute   
intracranial   
hemorrhage, mass   
effect or  
midline shift. No   
abnormal extra-axial   
fluid collection. The   
gray-white  
differentiation is   
maintained without   
evidence of an acute   
infarct. There is  
no evidence of   
hydrocephalus.  
  
ORBITS: The visualized   
portion of the orbits   
demonstrate no acute   
abnormality.  
  
SINUSES: The   
visualized paranasal   
sinuses and mastoid   
air cells demonstrate  
no acute abnormality.  
  
SOFT TISSUES/SKULL: No   
acute abnormality of   
the visualized skull   
or soft  
tissues.  
  
IMPRESSION:  
No acute intracranial   
abnormality.  
                                                            [place]  
   
                                                            EXAMINATION: CT OF T  
   
HEAD WITHOUT CONTRAST   
2020 12:28 am   
TECHNIQUE: CT of the   
head was performed   
without the   
administration of   
intravenous contrast.   
Dose modulation,   
iterative   
reconstruction, and/or   
weight based   
adjustment of the   
mA/kV was utilized to   
reduce the radiation   
dose to as low as   
reasonably achievable.   
COMPARISON: None.   
HISTORY: ORDERING   
SYSTEM PROVIDED   
HISTORY: Syncope, Head   
trauma, Unresponsive   
for 10 minutes.   
TECHNOLOGIST PROVIDED   
HISTORY: ALtered   
mental status Syncope,   
Head trauma,   
Unresponsive for 10   
minutes. FINDINGS: CT   
HEAD:   
BRAIN/VENTRICLES:   
There is no acute   
intracranial   
hemorrhage, mass   
effect or midline   
shift. No abnormal   
extra-axial fluid   
collection. The   
gray-white   
differentiation is   
maintained without   
evidence of an acute   
infarct. There is no   
evidence of   
hydrocephalus. ORBITS:   
The visualized portion   
of the orbits   
demonstrate no acute   
abnormality. SINUSES:   
The visualized   
paranasal sinuses and   
mastoid air cells   
demonstrate no acute   
abnormality. SOFT   
TISSUES/SKULL: No   
acute abnormality of   
the visualized skull   
or soft tissues.                                            [place]  
   
                                                    Cardiacon 2020   
   
                                                    Cholesterol   
[Mass/Vol]                              1. No pulmonary   
embolism or acute   
pulmonary abnormality.   
2. Cholelithiasis. 3.   
Colonic diverticulosis   
without acute   
diverticulitis.                                             [place]  
   
                                                    Ethanolon 2020   
   
                      Ethanol [Mass/Vol] mg/dL                 <10 mg/dL  [place]  
   
                      Ethanol percent <0.010                <0.010 %   [place]  
   
                                                    GFR, ESTIMATEDon 2020   
   
                                                    GFR/1.73 sq   
M.predicted MDRD   
(S/P/Bld) [Vol   
rate/Area]      63 ml/min/1.73m2 Abnormal                        [facility]  
   
                                        Comment on above:   Result Comment: Ana bhatt Description GFR, ml/min/1.73 m2  
- At increased risk > or = 60 (with chronic  
kidney disease risk factors)  
1 Normal or increased GFR > or = 90  
2 Mildly or decreased GFR 60 - 89  
3 Moderately decreased GFR 30 - 59  
4 Severely decreased GFR 15 - 29  
5 Kidney failure <15 (or dialysis)  
Estimated GFR calculated using abbreviated MDRD formula as  
recommended by [employer]. Calculation based  
upon serum creatinine and adjusted for age, gender & race.  
Kristie. Internal Med., Vol. 139 (2) pg 137-147.   
   
                                                            Performed By: #### C  
BCND, BMP, ANION, EGFR1 ####  
[facility]  
[address] [place] [address]   
   
                                                    Glomerular Filtration Rate,   
Estimatedon 2020   
   
                      Est, Glom Filt Rate 63         Abnormal   ml/min/1.73m2 [place]  
   
                                        Comment on above:   Stage Description GF  
R, ml/min/1.73 m2  
- At increased risk > or = 60 (with chronic  
kidney disease risk factors)  
1 Normal or increased GFR > or = 90  
2 Mildly or decreased GFR 60 - 89  
3 Moderately decreased GFR 30 - 59  
4 Severely decreased GFR 15 - 29  
5 Kidney failure <15 (or dialysis)  
Estimated GFR calculated using abbreviated MDRD formula as  
recommended by [employer]. Calculation based  
upon serum creatinine and adjusted for age, gender & race.  
Kristie. Internal Med., Vol. 139 (2) pg 137-147.  
Performed at [facility] [address]   
[address]  
  
   
   
                                                    Hepatic Function Panelon    
   
                      Albumin [Mass/Vol] 4.4 g/dL              3.5 - 5.2 g/dL [place]  
   
                                                    Albumin/Globulin   
[Mass ratio]    1.5 {ratio}                                     [place]  
   
                                                    ALP [Catalytic   
activity/Vol]   91 U/L                          35 - 104 U/L    [place]  
   
                                                    ALT [Catalytic   
activity/Vol]   17 U/L                          5 - 33 U/L      [place]  
   
                                                    AST [Catalytic   
activity/Vol]   22 U/L                          <32             [place]  
   
                          Bilirubin Ql (U) 0.20 mg/dL   Low          0.3 - 1.2   
mg/dL                                   [place]  
   
                      Bilirubin, Indirect CANNOT BE CALCULATED            0 - 1   
mg/dL [place]  
   
                                                    Bilirubin.direct   
[Mass/Vol]      mg/dL                           <0.31 mg/dL     [place]  
   
                                                    Globulin (S)   
[Mass/Vol]      NOT REPORTED                    1.5 - 3.8 g/dL  [place]  
   
                      Protein [Mass/Vol] 7.4 g/dL              6.4 - 8.3 g/dL Me  
[place], [place]  
   
                                                    LACTIC ACIDon 2020   
   
                      Lactate [Moles/Vol] 3.1 mmol/L High       0.5-2.2    [facility]  
   
                                        Comment on above:   Performed By: #### L  
ACAP ####[facility]   
[id number]   
[place] [address]   
   
                      Lactate [Moles/Vol] 3.3 mmol/L High       0.5-2.2    [facility]  
   
                                        Comment on above:   Performed By: #### L  
ACAP ####  
[facility]  
[address] [place] [address]   
   
                                                    Lactic Acidon 2020   
   
                                                    Interpretation and   
review of laboratory   
results         Abnormal                                        [place]  
   
                          Lactate [Moles/Vol] 2.6 mmol/L   High         0.5 - 2.  
2   
mmol/L                                  [place]  
   
                                                    Lactic acid, plasmaon 2020   
   
                                                    Interpretation and   
review of laboratory   
results         Abnormal                                        [place]  
   
                          Lactate [Moles/Vol] 3.1 mmol/L   High         0.5 - 2.  
2   
mmol/L                                  [place]  
   
                                        Comment on above:   Performed at [facility] [address] [place]   
[address]   
   
                                                    Interpretation and   
review of laboratory   
results         Abnormal                                        [place]  
   
                          Lactate [Moles/Vol] 3.3 mmol/L   High         0.5 - 2.  
2   
mmol/L                                  [place]  
   
                                        Comment on above:   Performed at [facility] [address] [place]   
[address]   
   
                                                    Lipaseon 2020   
   
                                                    Lipase [Catalytic   
activity/Vol]   36 U/L                          13 - 60 U/L     [place]  
   
                                                    MRI BRAIN WO CONTRASTon    
   
                                                    MRI BRAIN WO   
CONTRAST                                PROCEDURE: MRI BRAIN   
WO CONTRAST  
CLINICAL INFORMATION   
new seizure. Worsening   
chronic headaches.   
Seizure-like activity.  
COMPARISON: No prior   
study.  
TECHNIQUE: Multiplanar   
and multiple spin echo   
MRI images were   
obtained of the brain   
without contrast.  
FINDINGS:  
The diffusion-weighted   
images are normal. The   
brain volume is   
normal. There is   
minimal signal   
hyperintensity   
scattered in the white   
matter of the brain   
suggestive of minimal   
severity chronic small   
vessel ischemic   
changes.  
There are no intra-or   
extra-axial   
collections. There is   
no hydrocephalus,   
midline shift or mass   
effect.  
There is   
mineralization in the   
medial aspects of the   
basal ganglia   
bilaterally. No other   
areas of   
susceptibility   
artifact are present.   
The major intracranial   
vascular flow voids   
are present.  
The midline   
craniocervical   
junction structures   
are normal. The   
pituitary gland and   
brainstem are normal.  
IMPRESSION:  
  
1. Minimal severity   
chronic small vessel   
ischemic changes.  
2. No acute findings.  
**This report has been   
created using voice   
recognition software.   
It may contain minor   
errors which are   
inherent in voice   
recognition   
technology.**  
Final report   
electronically signed   
by Dr. Ara Guerrero   
on 2020 10:57 AM  
Interpreted by:  
Ara Guerrero MD  
Signed by:  
Ara Guerrero MD  
20  
Final result        Normal                                  [facility] [facility]  
   
                                                            PROCEDURE: MRI BRAIN  
   
WO CONTRAST CLINICAL   
INFORMATION new   
seizure. Worsening   
chronic headaches.   
Seizure-like activity.   
COMPARISON: No prior   
study. TECHNIQUE:   
Multiplanar and   
multiple spin echo MRI   
images were obtained   
of the brain without   
contrast. FINDINGS:   
The diffusion-weighted   
images are normal. The   
brain volume is   
normal. There is   
minimal signal   
hyperintensity   
scattered in the white   
matter of the brain   
suggestive of minimal   
severity chronic small   
vessel ischemic   
changes. There are no   
intra-or extra-axial   
collections. There is   
no hydrocephalus,   
midline shift or mass   
effect. There is   
mineralization in the   
medial aspects of the   
basal ganglia   
bilaterally. No other   
areas of   
susceptibility   
artifact are present.   
The major intracranial   
vascular flow voids   
are present. The   
midline craniocervical   
junction structures   
are normal. The   
pituitary gland and   
brainstem are normal.                                         [facility]-   
[place], [place]  
   
                                                            Yossi, [facility] Incoming   
Radiant Results From   
[employer] -   
2020 10:59 AM   
EDT PROCEDURE: MRI   
BRAIN WO CONTRAST  
  
CLINICAL INFORMATION   
new seizure. Worsening   
chronic headaches.   
Seizure-like activity.  
  
COMPARISON: No prior   
study.  
  
TECHNIQUE: Multiplanar   
and multiple spin echo   
MRI images were   
obtained of the brain   
without contrast.  
  
FINDINGS:  
  
  
  
The diffusion-weighted   
images are normal. The   
brain volume is   
normal. There is   
minimal signal   
hyperintensity   
scattered in the white   
matter of the brain   
suggestive of minimal   
severity chronic small   
vessel ischemic   
changes.  
  
There are no intra-or   
extra-axial   
collections. There is   
no hydrocephalus,   
midline shift or mass   
effect.  
  
There is   
mineralization in the   
medial aspects of the   
basal ganglia   
bilaterally. No other   
areas of   
susceptibility   
artifact are present.   
The major intracranial   
vascular flow voids   
are present.  
  
The midline   
craniocervical   
junction structures   
are normal. The   
pituitary gland and   
brainstem are normal.  
  
  
  
  
IMPRESSION:  
  
1. Minimal severity   
chronic small vessel   
ischemic changes.  
2. No acute findings.  
  
  
  
  
**This report has been   
created using voice   
recognition software.   
It may contain minor   
errors which are   
inherent in voice   
recognition   
technology.**  
  
Final report   
electronically signed   
by Dr. Ara Guerrero   
on 2020 10:57 AM                                         [place]  
   
                                                            1. Minimal severity   
chronic small vessel   
ischemic changes. 2.   
No acute findings.   
**This report has been   
created using voice   
recognition software.   
It may contain minor   
errors which are   
inherent in voice   
recognition   
technology.** Final   
report electronically   
signed by Dr. Ara Guerrero on 2020   
10:57 AM                                                    [place]  
   
                                                    Metabolic Panelon 2020  
   
   
                                                    GFR/1.73 sq M   
predicted among   
non-blacks MDRD   
(S/P/Bld) [Vol   
rate/Area]                                                      [place]  
   
                                        Comment on above:   Stage 1: Some kidney  
 damage normal GFR  
Stage 2: Mild kidney damage GFR 60-89  
Stage 3: Moderate kidney damage GFR 30-59  
Stage 4: Severe kidney damage GFR 15-29  
Stage 5: Severe kidney damage GFR <15  
ESRD - chronic treatment by dialysis or transplant  
  
  
   
   
                                                            Average GFR for 60-6  
9 years old:  
85 mL/min/1.73sq m  
Chronic Kidney Disease:  
<60 mL/min/1.73sq m  
Kidney failure:  
<15 mL/min/1.73sq m  
  
  
eGFR calculated using average adult body mass. Additional eGFR   
calculator available at:  
  
http://www.[employer]/multiple_crcl_2012.htm  
  
  
   
   
                                                    Otheron 2020   
   
                                                    Interpretation and   
review of laboratory   
results         Abnormal                                        [place]  
   
                                                            Yossi, [facility] Incoming   
Radiant Results From   
[employer] -   
2020 1:52 AM EDT   
EXAMINATION:  
CTA OF THE CHEST; CT   
OF THE ABDOMEN AND   
PELVIS WITH CONTRAST   
2020 12:31  
am  
  
TECHNIQUE:  
CTA of the chest and   
CT of the abdomen and   
pelvis was performed   
with the  
administration of 75   
mL Isovue 370   
intravenous contrast.   
Multiplanar  
reformatted images are   
provided for review.   
Dose modulation,   
iterative  
reconstruction, and/or   
weight based   
adjustment of the   
mA/kV was utilized to  
reduce the radiation   
dose to as low as   
reasonably achievable.  
  
COMPARISON:  
None  
  
HISTORY:  
Patient had a syncopal   
episode and was   
unresponsive for 10   
minutes. Acute  
chest pain with   
hypoxia. Multiple   
episodes of vomiting.   
Upper GI bleeding.  
  
FINDINGS:  
  
Chest:  
  
Pulmonary Arteries:   
Pulmonary arteries are   
adequately opacified   
for  
evaluation. No   
intraluminal filling   
defect to suggest   
pulmonary embolism.  
Main pulmonary artery   
is normal in caliber.  
  
Mediastinum: Heart is   
not enlarged but there   
is mild left   
ventricular  
hypertrophy. No   
pericardial effusion.   
Thoracic aorta is   
normal caliber. No  
lymphadenopathy.   
Scattered calcified   
lymph nodes. Esophagus   
is unremarkable.  
  
Lungs/pleura: Lungs   
are clear. No pleural   
effusion or   
pneumothorax.  
  
Soft Tissues/Bones:   
Unremarkable.  
  
  
Abdomen/Pelvis:  
  
Organs:   
Cholelithiasis.   
Calcified splenic   
granulomas. Liver,   
pancreas,  
adrenals, are   
unremarkable. Small   
bilateral renal cysts   
measuring up to 1.2  
cm. No hydronephrosis.  
  
GI/Bowel: Stomach and   
small bowel are   
unremarkable. The   
appendix is  
surgically absent.   
Diverticulosis   
throughout the colon   
without acute  
inflammatory changes.  
  
Pelvis: Bladder and   
uterus are   
unremarkable. No   
lymphadenopathy or   
free  
fluid.  
  
Peritoneum/Retroperito  
neum: No   
lymphadenopathy or   
ascites. Mild to   
moderate  
atherosclerotic   
disease without   
abdominal aortic   
aneurysm.  
  
Bones/Soft Tissues:   
Degenerative disc   
disease at L5-S1.  
  
IMPRESSION:  
1. No pulmonary   
embolism or acute   
pulmonary abnormality.  
2. Cholelithiasis.  
3. Colonic   
diverticulosis without   
acute diverticulitis.  
                                                            [place]  
   
                                                            EXAMINATION: CTA OF   
THE CHEST; CT OF THE   
ABDOMEN AND PELVIS   
WITH CONTRAST   
2020 12:31 am   
TECHNIQUE: CTA of the   
chest and CT of the   
abdomen and pelvis was   
performed with the   
administration of 75   
mL Isovue 370   
intravenous contrast.   
Multiplanar   
reformatted images are   
provided for review.   
Dose modulation,   
iterative   
reconstruction, and/or   
weight based   
adjustment of the   
mA/kV was utilized to   
reduce the radiation   
dose to as low as   
reasonably achievable.   
COMPARISON: None   
HISTORY: Patient had a   
syncopal episode and   
was unresponsive for   
10 minutes. Acute   
chest pain with   
hypoxia. Multiple   
episodes of vomiting.   
Upper GI bleeding.   
FINDINGS: Chest:   
Pulmonary Arteries:   
Pulmonary arteries are   
adequately opacified   
for evaluation. No   
intraluminal filling   
defect to suggest   
pulmonary embolism.   
Main pulmonary artery   
is normal in caliber.   
Mediastinum: Heart is   
not enlarged but there   
is mild left   
ventricular   
hypertrophy. No   
pericardial effusion.   
Thoracic aorta is   
normal caliber. No   
lymphadenopathy.   
Scattered calcified   
lymph nodes. Esophagus   
is unremarkable.   
Lungs/pleura: Lungs   
are clear. No pleural   
effusion or   
pneumothorax. Soft   
Tissues/Bones:   
Unremarkable.   
Abdomen/Pelvis:   
Organs:   
Cholelithiasis.   
Calcified splenic   
granulomas. Liver,   
pancreas, adrenals,   
are unremarkable.   
Small bilateral renal   
cysts measuring up to   
1.2 cm. No   
hydronephrosis.   
GI/Bowel: Stomach and   
small bowel are   
unremarkable. The   
appendix is surgically   
absent. Diverticulosis   
throughout the colon   
without acute   
inflammatory changes.   
Pelvis: Bladder and   
uterus are   
unremarkable. No   
lymphadenopathy or   
free fluid.   
Peritoneum/Retroperito  
neum: No   
lymphadenopathy or   
ascites. Mild to   
moderate   
atherosclerotic   
disease without   
abdominal aortic   
aneurysm. Bones/Soft   
Tissues: Degenerative   
disc disease at L5-S1.                                         [place]  
   
                                                    Interpretation and   
review of laboratory   
results         Abnormal                                        [place]  
   
                                                    Protime-INRon 2020   
   
                                                    INR Coag (PPP)   
[Relative time] 1.0 {INR}                                       [place]  
   
                                        Comment on above:     
Non-therapeutic Range:  
INR = 0.9-1.2  
Therapeutic Range:  
Moderate Anticoagulant Intensity:  
INR = 2.0-3.0  
High Anticoagulant Intensity:  
INR = 2.5-3.5  
  
  
   
   
                      PT Coag (PPP) [Time] 12.9 s                           [place], [place]  
   
                                                    Troponinon 2020   
   
                                                    Troponin I.cardiac   
[Mass/Vol]      NOT REPORTED                                    [place]  
   
                                                    Troponin T.cardiac   
[Mass/Vol]      NOT REPORTED                    <0.03 ng/mL     [place]  
   
                                                    Troponin, High   
Sensitivity     9 ng/L                          0 - 14 ng/L     [place]  
   
                                        Comment on above:     
High Sensitivity Troponin values cannot be compared with other   
Troponin methodologies.  
  
Patients with high levels of Biotin oral intake (i.e >5mg/day)   
may have falsely decreased  
Troponin levels. Samples collected within 8 hours of biotin   
intake may require additional  
information for diagnosis.  
  
   
   
                                                    XR ANKLE LEFT (MIN 3 VIEWS)o  
n 2020   
   
                                                    XR ANKLE LEFT (MIN 3   
VIEWS)                                  PROCEDURE: XR ANKLE   
LEFT (MIN 3 VIEWS)  
CLINICAL INFORMATION:   
Left ankle pain; fall   
versus seizure; assess   
for fracture.  
COMPARISON: No prior   
study.  
TECHNIQUE: AP, lateral   
and mortise views   
performed.  
FINDINGS:  
POSTOPERATIVE CHANGES:   
None.  
ALIGNMENT: Anatomic.  
MINERALIZATION:   
Normal.  
PLAFOND/TALAR   
DOME/ANKLE MORTISE:   
Normal.  
FRACTURE: None.  
JOINT SPACES:  
1. There is a large   
marginal osteophyte   
off the posterior   
aspect of the distal   
talus and subchondral   
cystic changes and   
patchy sclerosis   
within this marginal   
osteophyte and the   
abutting Stieda   
process of the talus   
which can be   
associated with  
impingement.  
OTHER:  
1. There are 0.7 cm   
and 0.6 cm ossicles   
inferior to the medial   
malleolus.  
2. There are small   
enthesophytes at the   
calcaneal attachment   
of the Achilles tendon   
and the plantar   
fascia.  
JOINT EFFUSION: None.  
SOFT TISSUE:  
1. There is mild soft   
tissue swelling along   
the lateral malleolus.  
IMPRESSION:  
1. There is no acute   
fracture.  
2. There is mild soft   
tissue swelling along   
the lateral malleolus.  
**This report has been   
created using voice   
recognition software.   
It may contain minor   
errors which are   
inherent in voice   
recognition   
technology.**  
Final report   
electronically signed   
by Dr. Win Freeman on   
2020 11:54 AM  
Interpreted by:  
Win Freeman MD  
Signed by:  
Win Freeman MD  
20  
Final result        Normal                                  [facility]  
   
                                                            Yossi, [facility] Incoming   
Radiant Results From   
[employer] -   
2020 11:56 AM   
EDT PROCEDURE: XR   
ANKLE LEFT (MIN 3   
VIEWS)  
  
CLINICAL INFORMATION:   
Left ankle pain; fall   
versus seizure; assess   
for fracture.  
  
COMPARISON: No prior   
study.  
  
TECHNIQUE: AP, lateral   
and mortise views   
performed.  
  
  
FINDINGS:  
POSTOPERATIVE CHANGES:   
None.  
  
ALIGNMENT: Anatomic.  
  
MINERALIZATION:   
Normal.  
  
PLAFOND/TALAR   
DOME/ANKLE MORTISE:   
Normal.  
  
FRACTURE: None.  
  
JOINT SPACES:  
1. There is a large   
marginal osteophyte   
off the posterior   
aspect of the distal   
talus and subchondral   
cystic changes and   
patchy sclerosis   
within this marginal   
osteophyte and the   
abutting Stieda   
process of the talus   
which can be   
associated with  
impingement.  
  
OTHER:  
1. There are 0.7 cm   
and 0.6 cm ossicles   
inferior to the medial   
malleolus.  
2. There are small   
enthesophytes at the   
calcaneal attachment   
of the Achilles tendon   
and the plantar   
fascia.  
  
JOINT EFFUSION: None.  
  
SOFT TISSUE:  
1. There is mild soft   
tissue swelling along   
the lateral malleolus.  
  
  
IMPRESSION:  
1. There is no acute   
fracture.  
2. There is mild soft   
tissue swelling along   
the lateral malleolus.  
  
  
  
**This report has been   
created using voice   
recognition software.   
It may contain minor   
errors which are   
inherent in voice   
recognition   
technology.**  
  
Final report   
electronically signed   
by Dr. Win Freeman on   
2020 11:54 AM                                          [place]  
   
                                                            PROCEDURE: XR ANKLE   
LEFT (MIN 3 VIEWS)   
CLINICAL INFORMATION:   
Left ankle pain; fall   
versus seizure; assess   
for fracture.   
COMPARISON: No prior   
study. TECHNIQUE: AP,   
lateral and mortise   
views performed.   
FINDINGS:   
POSTOPERATIVE CHANGES:   
None. ALIGNMENT:   
Anatomic.   
MINERALIZATION:   
Normal. PLAFOND/TALAR   
DOME/ANKLE MORTISE:   
Normal. FRACTURE:   
None. JOINT SPACES: 1.   
There is a large   
marginal osteophyte   
off the posterior   
aspect of the distal   
talus and subchondral   
cystic changes and   
patchy sclerosis   
within this marginal   
osteophyte and the   
abutting Stieda   
process of the talus   
which can be   
associated with   
impingement. OTHER: 1.   
There are 0.7 cm and   
0.6 cm ossicles   
inferior to the medial   
malleolus. 2. There   
are small   
enthesophytes at the   
calcaneal attachment   
of the Achilles tendon   
and the plantar   
fascia. JOINT   
EFFUSION: None. SOFT   
TISSUE: 1. There is   
mild soft tissue   
swelling along the   
lateral malleolus.                                          [place]  
   
                                                            1. There is no acute  
   
fracture. 2. There is   
mild soft tissue   
swelling along the   
lateral malleolus.   
**This report has been   
created using voice   
recognition software.   
It may contain minor   
errors which are   
inherent in voice   
recognition   
technology.** Final   
report electronically   
signed by Dr. Win Freeman on 2020   
11:54 AM                                                    [place]  
   
                                                    XR LUMBAR SPINE (2-3 VIEWS)o  
n 2020   
   
                                                    XR LUMBAR SPINE (2-3   
VIEWS)                                  LUMBAR SPINE 2 VIEWS:  
CLINICAL INFORMATION:   
low back pain,   
seizure, fall  
COMPARISON: No prior   
study.  
TECHNIQUE: Standard AP   
and lateral views of   
lumbar spine were   
obtained.  
IMPRESSION:  
1. Slight   
thoracolumbar   
levoscoliosis. Mild   
degenerative spurring   
anterior/inferior   
corner of L2.  
2. No fracture. Disc   
spaces   
well-maintained.  
3. Moderate multifocal   
degenerative facet   
arthropathy lower 3   
lumbar levels.   
Sacroiliac joints   
unremarkable.  
**This report has been   
created using voice   
recognition software.   
It may contain minor   
errors which are   
inherent in voice   
recognition   
technology.**  
Final report   
electronically signed   
by Dr. Henrik Macias   
on 2020 11:23 AM  
Interpreted by:  
Henrik Macias MD  
Signed by:  
Henrik Macias MD  
20  
Final result        Normal                                  [facility]  
   
                                                            LUMBAR SPINE 2 VIEWS  
:   
CLINICAL INFORMATION:   
low back pain,   
seizure, fall   
COMPARISON: No prior   
study. TECHNIQUE:   
Standard AP and   
lateral views of   
lumbar spine were   
obtained.                                                   [place]  
   
                                                            Yossi, [facility] Incoming   
Radiant Results From   
[employer] -   
2020 11:25 AM   
EDT LUMBAR SPINE 2   
VIEWS:  
  
CLINICAL INFORMATION:   
low back pain,   
seizure, fall  
  
COMPARISON: No prior   
study.  
  
TECHNIQUE: Standard AP   
and lateral views of   
lumbar spine were   
obtained.  
  
  
IMPRESSION:  
1. Slight   
thoracolumbar   
levoscoliosis. Mild   
degenerative spurring   
anterior/inferior   
corner of L2.  
2. No fracture. Disc   
spaces   
well-maintained.  
3. Moderate multifocal   
degenerative facet   
arthropathy lower 3   
lumbar levels.   
Sacroiliac joints   
unremarkable.  
  
  
  
**This report has been   
created using voice   
recognition software.   
It may contain minor   
errors which are   
inherent in voice   
recognition   
technology.**  
  
Final report   
electronically signed   
by Dr. Henrik Macias   
on 2020 11:23 AM                                         [facility] [place]  
   
                                                            1. Slight   
thoracolumbar   
levoscoliosis. Mild   
degenerative spurring   
anterior/inferior   
corner of L2. 2. No   
fracture. Disc spaces   
well-maintained. 3.   
Moderate multifocal   
degenerative facet   
arthropathy lower 3   
lumbar levels.   
Sacroiliac joints   
unremarkable. **This   
report has been   
created using voice   
recognition software.   
It may contain minor   
errors which are   
inherent in voice   
recognition   
technology.** Final   
report electronically   
signed by Dr. Henrik Macias on 2020   
11:23 AM                                                    [facility] [place]  
   
                                                    XR THORACIC SPINE (3 VIEWS)o  
n 2020   
   
                                                    XR THORACIC SPINE (3   
VIEWS)                                  THORACIC SPINE 3   
VIEWS:  
CLINICAL INFORMATION:   
mid back pain,   
seizure, fall  
TECHNIQUE: Standard   
AP, lateral, and   
swimmer's views of the   
thoracic spine were   
obtained.  
FINDINGS: No fracture   
is seen. Disc spaces   
well-maintained.   
Paravertebral soft   
tissues unremarkable.   
There is moderate   
hypertrophic spurring   
lower cervical spine.  
IMPRESSION:  
Normal thoracic spine.  
**This report has been   
created using voice   
recognition software.   
It may contain minor   
errors which are   
inherent in voice   
recognition   
technology.**  
Final report   
electronically signed   
by Dr. Henrik Macias   
on 2020 11:24 AM  
Interpreted by:  
Henrik Macias MD  
Signed by:  
Henrik Macias MD  
20  
Final result        Normal                                  [facility]  
   
                                                            [place], [facility] Incoming   
Radiant Results From   
[employer] -   
2020 11:26 AM   
EDT THORACIC SPINE 3   
VIEWS:  
  
CLINICAL INFORMATION:   
mid back pain,   
seizure, fall  
  
TECHNIQUE: Standard   
AP, lateral, and   
swimmer's views of the   
thoracic spine were   
obtained.  
  
FINDINGS: No fracture   
is seen. Disc spaces   
well-maintained.   
Paravertebral soft   
tissues unremarkable.   
There is moderate   
hypertrophic spurring   
lower cervical spine.  
  
IMPRESSION:  
Normal thoracic spine.  
  
  
  
**This report has been   
created using voice   
recognition software.   
It may contain minor   
errors which are   
inherent in voice   
recognition   
technology.**  
  
Final report   
electronically signed   
by Dr. Henrik Macias   
on 2020 11:24 AM                                         [place]  
   
                                                            THORACIC SPINE 3   
VIEWS: CLINICAL   
INFORMATION: mid back   
pain, seizure, fall   
TECHNIQUE: Standard   
AP, lateral, and   
swimmer's views of the   
thoracic spine were   
obtained. FINDINGS: No   
fracture is seen. Disc   
spaces   
well-maintained.   
Paravertebral soft   
tissues unremarkable.   
There is moderate   
hypertrophic spurring   
lower cervical spine.                                         [place]  
   
                                                            Normal thoracic spin  
e.   
**This report has been   
created using voice   
recognition software.   
It may contain minor   
errors which are   
inherent in voice   
recognition   
technology.** Final   
report electronically   
signed by Dr. Henrik Macias on 2020   
11:24 AM                                                    [place]  
   
                                                    CBC Auto Differentialon -   
   
                                                    Basophils (Bld)   
[#/Vol]         0.03 10*3/uL                                    [place]  
   
                                                    Basophils/100 WBC   
(Bld)           0 %                             0 - 2 %         [place]  
   
                      Differential Type NOT REPORTED                       [place]  
   
                                                    Eosinophils (Bld)   
[#/Vol]         0.14 10*3/uL                                    [place]  
   
                                                    Eosinophils/100 WBC   
(Bld)           2 %                             1 - 4 %         [place]  
   
                                                    Erythrocyte   
distribution width   
(RBC) [Ratio]   13.2 %                          11.8 - 14.4 %   [place]  
   
                                                    Hematocrit (Bld)   
[Volume fraction] 41.6 %                          36.3 - 47.1 %   [place]  
   
                                                    Hemoglobin (Bld)   
[Mass/Vol]          13.5 g/dL                               11.9 - 15.1   
g/dL                                    [place]  
   
                                                    Immature   
granulocytes (Bld)   
[#/Vol]         0.04 10*3/uL                                    [place]  
   
                                                    Immature   
granulocytes (Bld)   
[#/Vol]         1 %             High            0               [place]  
   
                                                    Interpretation and   
review of laboratory   
results         Abnormal                                        [place]  
   
                                                    Lymphocytes (Bld)   
[#/Vol]         1.70 10*3/uL                                    [place]  
   
                                                    Lymphocytes/100 WBC   
(Bld)           20 %            Low             24 - 43 %       [place]  
   
                                                    MCH (RBC) [Entitic   
mass]           29.4 pg                         25.2 - 33.5 pg  [place]  
   
                                                    MCHC (RBC)   
[Mass/Vol]          32.5 g/dL                               28.4 - 34.8   
g/dL                                    [place]  
   
                                                    MCV (RBC) [Entitic   
vol]                90.6 fL                                 82.6 - 102.9   
fL                                      [place]  
   
                                                    Monocytes (Bld)   
[#/Vol]         0.38 10*3/uL                                    [place]  
   
                                                    Monocytes/100 WBC   
(Bld)           5 %                             3 - 12 %        [place]  
   
                                                    Platelet mean volume   
(Bld) [Entitic vol] 11.0 fL                         8.1 - 13.5 fL   [place]  
   
                                                    Platelets (Bld)   
[#/Vol]         201 10*3/uL                                     [place]  
   
                                                    Platelets (Bld)   
[#/Vol]         NOT REPORTED                                    [place]  
   
                          RBC (Bld) [#/Vol] 4.59 10*6/uL              3.95 - 5.1  
1   
m/uL                                    [place]  
   
                                                    RBC morphology   
finding Nom (Bld) NOT REPORTED                                    [place]  
   
                                                    Segmented   
neutrophils/100 WBC   
(Bld)           72 %            High            36 - 65 %       [place]  
   
                      Segs Absolute 6.14                             [place]  
   
                          WBC (Bld) [#/Vol] 0.0 10*3/uL               0.0 per 10  
0   
WBC                                     [place]  
   
                      WBC (Bld) [#/Vol] 8.4 10*3/uL                       [place]  
   
                      WBC Morphology NOT REPORTED                       [place], [place]  
  
  
  
Vital Signs  
  
  
                          Date Time    Vital Sign   Value        Performing   
Clinician                               Facility  
   
                                                    2025   
14:          Body height         170.2 cm            Alistair Martínez MD  
Work Phone:   
[phone]                          [facility]  
   
                                                    2025   
14:                              Body mass index   
(BMI) [Ratio]             31.64 kg/m2               Alistair Martínez MD  
Work Phone:   
[phone]                          [facility]  
   
                                                    2025   
14:          Body temperature    97.11 [degF]        Alistair Martínez MD  
Work Phone:   
[phone]                          [facility]  
   
                                                    2025   
14:          Body weight         91.63 kg            Alistair Martínez MD  
Work Phone:   
[phone]                          [facility]  
   
                                                    2025   
14:                              Diastolic blood   
pressure                  68 mm[Hg]                 Alistair Martínez MD  
Work Phone:   
[phone]                          [facility]  
   
                                                    2025   
14:          Heart rate          76 /min             Alistair Martínez MD  
Work Phone:   
[phone]                          [facility]  
   
                                                    2025   
14:          Respiratory rate    22 /min             Alistair Martínez MD  
Work Phone:   
[phone]                          [facility]  
   
                                                    2025   
14:                              SaO2% (BldA) [Mass   
fraction]                 97 %                      Alistair Martínez MD  
Work Phone:   
[phone]                          [facility]  
   
                                                    2025   
14:                              Systolic blood   
pressure                  114 mm[Hg]                Alistair Martínez MD  
Work Phone:   
[phone]                          [facility]  
   
                                                    10-   
11:          Body height         170.2 cm            Alistair Martínez MD  
Work Phone:   
[phone]                          [facility]  
   
                                                    10-   
11:                              Body mass index   
(BMI) [Ratio]             31.01 kg/m2               Alistair Martínez MD  
Work Phone:   
[phone]                          [facility]  
   
                                                    10-   
11:          Body temperature    97.11 [degF]        Alistair Martínez MD  
Work Phone:   
[phone]                          [facility]  
   
                                                    10-   
11:          Body weight         89.81 kg            Alistair Martínez MD  
Work Phone:   
[phone]                          [facility]  
   
                                                    10-   
11:                              Diastolic blood   
pressure                  76 mm[Hg]                 Alistair Martínez MD  
Work Phone:   
[phone]                          [facility]  
   
                                                    10-   
11:          Heart rate          79 /min             Alistair Martínez MD  
Work Phone:   
[phone]                          [facility]  
   
                                                    10-   
11:          Respiratory rate    22 /min             Alistair Martínez MD  
Work Phone:   
[phone]                          [facility]  
   
                                                    10-   
11:                              SaO2% (BldA) [Mass   
fraction]                 98 %                      Alistair Martínez MD  
Work Phone:   
[phone]                          [facility]  
   
                                                    10-   
11:                              Systolic blood   
pressure                  140 mm[Hg]                Alistair Martínez MD  
Work Phone:   
[phone]                          [facility]  
   
                                                    2021   
15:          Body height         170.2 cm            Kendra Crawford MD  
Work Phone:   
[phone]                          [email or web address]  
   
                                                    2021   
15:                              Body mass index   
(BMI) [Ratio]             28.98 kg/m2               Kendra Crawford MD  
Work Phone:   
[phone]                          [facility]  
   
                                                    2021   
15:          Body temperature    97.2 [degF]         Kendra Crawford MD  
Work Phone:   
[phone]                          [facility]  
   
                                                    2021   
15:          Body weight         83.92 kg            Kendra Crawford MD  
Work Phone:   
[phone]                          [email or web address]  
   
                                                    2021   
15:          Heart rate          92 /min             Kendra Crawford MD  
Work Phone:   
[phone]                          [facility]  
   
                                                    2021   
15:          Respiratory rate    18 /min             Kendra Crawford MD  
Work Phone:   
[phone]                          [email or web address]  
   
                                                    2021   
15:                              SaO2% (BldA) [Mass   
fraction]                 97 %                      Kendra Crawford MD  
Work Phone:   
[phone]                          [email or web address]  
   
                                                    2020   
11:      Body Temperature 97.81 [degF]    Mohsin Reza [facility]   
[place]  
   
                                                    2020   
11:      BP Diastolic    88 mm[Hg]       Mohsin Reza [facility] [place] [facility] [place]  
   
                                                    2020   
11:      BP Systolic     171 mm[Hg]      Mohsin Reza [facility] [place] [facility] [place]  
   
                                                    2020   
11:      Pulse (Heart Rate) 57 /min         Mohsin Reza [facility] [place],   
[place]  
   
                                                    2020   
11:      Pulse Oximetry  92 %            [facility]sin [facility]  
,   
[place]  
   
                                                    2020   
11:      Respiratory Rate 18 /min         Mohsin Reza [facility]   
[place]  
   
                                                    2020   
04:                              BMI (Body Mass   
Index)              35.3 kg/m2          Mohsin Reza [facility] [place]   
[place]  
   
                                                    2020   
04:      Body weight     99.2 kg         Mohsin Reza [facility]  
,   
[place]  
   
                                                    2020   
05:      Height          167.6 cm        Mohsin Reza [facility]  
,   
[place]  
   
                                                    2020   
04:      BP Diastolic    78 mm[Hg]       [facility]  
,   
[place]  
   
                                                    2020   
04:      BP Systolic     158 mm[Hg]      [facility]  
,   
[place]  
   
                                                    2020   
04:      Pulse (Heart Rate) 89 /min         [facility],   
[place]  
   
                                                    2020   
04:      Pulse Oximetry  93 %            [facility]  
,   
[place]  
   
                                                    2020   
23:      Body Temperature 96.6 [degF]     [place]  
   
                                                    2020   
23:      Respiratory Rate 16 /min         [place]  
  
  
  
Encounters  
  
  
                          Encounter Date Encounter Type Care Provider Facility  
   
                                                    Start: 2025  
End: 2025                         Office outpatient visit   
25 minutes                              Alistair Martínez MD  
Work Phone:   
[phone]                          [facility]  
   
                                        Comment on above:   Essential hypertensi  
on, benign (CMS/HCC) (Primary Dx);  
Chronic obstructive pulmonary disease, unspecified COPD type   
(CMS/HCC);  
Primary osteoarthritis of left wrist;  
MDD (major depressive disorder), recurrent episode, mild (HCC)   
(CMS/HCC);  
Generalized anxiety disorder (CMS/HCC);  
Dyslipidemia (CMS/HCC);  
Encounter for long-term current use of medication;  
Class 1 obesity due to excess calories with serious comorbidity and   
body mass index (BMI) of 31.0 to 31.9 in adult;  
Cigarette smoker   
   
                                                    Start: 2025  
End: 2025     ambulatory          ALISTAIR MARTÍNEZ        Not Available  
   
                                                    Start: 2025  
End: 2025           Bamboo flowsheet          Alistair Martínez MD  
Work Phone:   
[phone]                          [facility]  
   
                                                    Start: 2025  
End: 2025           Bamboo flowsheet          Alistair Martínez MD  
Work Phone:   
[phone]                          [facility]  
   
                                                    Start: 2025  
End: 2025           Refill                    Alistair Martínez MD  
Work Phone:   
[phone]                          [facility]  
   
                                        Comment on above:   Primary osteoarthrit  
is of left wrist   
   
                                                    Start: 2024  
End: 2024           Refill                    Alistair Martínez MD  
Work Phone:   
[phone]                          [facility]  
   
                                        Comment on above:   Primary osteoarthrit  
is of left wrist   
   
                                                    Start: 2024  
End: 2024           Refill                    Alistair Martínez MD  
Work Phone:   
[phone]                          [facility]  
   
                                        Comment on above:   Primary osteoarthrit  
is of left wrist   
   
                                                    Start: 10-  
End: 10-           Bamboo flowsheet          Alistair Martínez MD  
Work Phone:   
[phone]                          [facility]  
   
                                                    Start: 10-  
End: 10-           Bamboo flowsjeremias Martínez MD  
Work Phone:   
[phone]                          [facility]  
   
                                                    Start: 10-  
End: 10-                         Office outpatient visit   
25 minutes                              Alistair Martínez MD  
Work Phone:   
[phone]                          [facility]  
   
                                        Comment on above:   Chronic idiopathic u  
rticaria (Primary Dx);  
Chest pain, unspecified type;  
Palpitations;  
Generalized anxiety disorder (CMS/HCC);  
Essential hypertension, benign (CMS/HCC);  
Dyslipidemia (CMS/HCC);  
Breast cancer screening by mammogram;  
Colon cancer screening   
   
                                                    Start: 10-  
End: 10-     ambulatory          ALISTAIR MARTÍNEZ        Not Available  
   
                                                    Start: 10-  
End: 10-           Refill                    Alistair Martínez MD  
Work Phone:   
[phone]                          [facility]  
   
                                        Comment on above:   Primary osteoarthrit  
is of left wrist   
   
                                                    Start: 09-  
End: 2024           Refill                    Alistair Martíenz MD  
Work Phone:   
[phone]                          [facility]  
   
                                        Comment on above:   Primary osteoarthrit  
is of left wrist   
   
                                                    Start: 03-  
End: 03-     ambulatory          ALISTAIR MARTÍNEZ        Not Available  
   
                                                    Start: 2023  
End: 2023     ambulatory          DR ALISTAIR MARTÍNEZ   Facility:[facility]  
   
                                                    Start: 2023  
End: 2023     ambulatory          JONATHON VEGA     Facility:[facility]  
   
                                                    Start: 2023  
End: 2023     ambulatory          DAY GALAN        Facility:[facility]  
   
                                                    Start: 2021  
End: 2021                         Emergency department   
patient visit             KENDRA CRAWFORD              [facility]  
   
                                                    Start: 2021  
End: 2021                         Emergency department   
patient visit                           Kendra Crawford MD  
Work Phone:   
[phone]                          [facility]   
ED  
   
                                        Comment on above:   Dental decay (Primar  
y Dx);  
Pain, dental   
   
                                                    Start: 2020  
End: 2020                         Subsequent hospital   
visit by physician                      [facility]   
Room                                    [facility]   
Vascular Lab  
   
                                        Comment on above:   Syncope, unspecified  
 syncope type;  
Pericarditis, unspecified chronicity, unspecified type;  
Essential hypertension;  
Tobacco abuse;  
Depression, unspecified depression type;  
Atypical migraine   
   
                                                    Start: 2020  
End: 2020                         Subsequent hospital   
visit by physician                      [facility]   
Stress Room                             [facility]  
   
                                        Comment on above:   Arrived   
   
                                                    Start: 2020  
End: 2020                         Evaluation and   
management of inpatient   MOHSIN REZA               [facility]  
   
                                                    Start: 2020  
End: 2020                         Evaluation and   
management of inpatient                 Mohsin Reza  
Work Phone:   
[phone]                          [facility]  
   
                                        Comment on above:   Unresponsive episode  
 (Primary Dx)   
   
                                                    Start: 2020  
End: 2020                         Emergency department   
patient visit                           Newton Gutierrez  
Work Phone:   
[phone]                          [facility]   
ED  
  
  
  
Procedures  
  
  
                          Date         Procedure    Procedure Detail Performing   
Clinician  
   
                                                    Start:   
2020                              Duplex scan extracranial art   
compl bi study                                      Elsa Franks  
Work Phone:   
[phone]  
   
                                                    Start:   
2020          Assay of lactate                        MOHSIN MERLINE  
   
                                                    Start:   
2020                              Electroencephalogram w/rec   
awake&asleep                                        MOHSIN MERLINE  
   
                                                    Start:   
2020          Assay of lactate                        Gregory Montejo  
Work Phone:   
[phone]  
   
                                                    Start:   
2020                              AMB EXTERNAL REFERRAL TO   
CARDIOLOGY                                          [facility]  
   
                                                    Start:   
2020          DISCHARGE PATIENT                       [facility] MERLINE  
   
                                                    Start:   
2020          EEG                                     Elsa Real  
Work Phone:   
[phone]  
   
                                                    Start:   
2020          Radex ribs unilateral 2 views                     MOHSIN ZEINA  
A  
   
                                                    Start:   
2020          DIETARY NUTRITION SUPPLEMENTS                     MOHSIN ZEINA  
A  
   
                                                    Start:   
2020                              Echo tthrc r-t 2d w/wom-mode   
compl spec&colr d                                   MOHSIN MERLINE  
   
                                                    Start:   
2020                              INITIATE OXYGEN THERAPY   
PROTOCOL                                            [facility] MERLINE  
   
                                                    Start:   
2020          Radex ribs unilateral 2 views                     Gregory camargo  
Work Phone:   
[phone]  
   
                                                    Start:   
2020          Antibody bordetella                     MOHSIN MERLINE  
   
                                                    Start:   
2020          Blood count complete automated                     MOHSIN RE  
ZA  
   
                                                    Start:   
2020          Comprehensive metabolic panel                     MOHSIN ZEINA  
A  
   
                                                    Start:   
2020          Creatinine blood                        MOHSIN MERLINE  
   
                                                    Start:   
2020          Anion gap [Moles/Vol]                     Tri A Rethman  
Work Phone:   
[phone]  
   
                                                    Start:   
2020                              BASIC METABOLIC PANEL W/ REFLEX   
TO MG FOR LOW K                                     Tri A Rethman  
Work Phone:   
[phone]  
   
                                                    Start:   
2020          Blood count complete automated                     Tri A  
 Rethman  
Work Phone:   
[phone]  
   
                                                    Start:   
2020                              GLOMERULAR FILTRATION RATE,   
ESTIMATED                                           Tri A Rethman  
Work Phone:   
[phone]  
   
                                                    Start:   
2020          Assay of lactate                        MOHSIN MERLINE  
   
                                                    Start:   
2020          Assay of lactate                        Tri A Rethman  
Work Phone:   
[phone]  
   
                                                    Start:   
2020          Antibody bordetella                     MO[facility] MERLINE  
   
                                                    Start:   
2020          Assay of lactate                        [facility]  
   
                                                    Start:   
2020                              Basic metabolic panel calcium   
total                                               [facility] MERLINE  
   
                                                    Start:   
2020          Blood count complete automated                     MOHSIN RE  
ZA  
   
                                                    Start:   
2020          Creatine kinase total                     MOHSIN MERLINE  
   
                                                    Start:   
2020          Creatinine blood                        [facility] MERLINE  
   
                                                    Start:   
2020          Radex spine thoracic 3 views                     MOHSIN MERLINE  
   
                                                    Start:   
2020                              Radex spine lumbosacral 2/3   
views                                               MOHSIN MERLINE  
   
                                                    Start:   
2020                              Radex ankle complete minimum 3   
views                                               [facility] MERLINE  
   
                                                    Start:   
2020          Anion gap [Moles/Vol]                     Tri A Qoostaran  
Work Phone:   
[phone]  
   
                                                    Start:   
2020          Assay of lactate                        Tri A Qoostaran  
Work Phone:   
[phone]  
   
                                                    Start:   
2020                              Basic metabolic panel calcium   
total                                               Tri A Rethman  
Work Phone:   
[phone]  
   
                                                    Start:   
2020          Blood count complete automated                     Tri A  
 [facility]  
Work Phone:   
[phone]  
   
                                                    Start:   
2020          Creatine kinase total                     Susan L Leopold  
Work Phone:   
[phone]  
   
                                                    Start:   
2020                              GLOMERULAR FILTRATION RATE,   
ESTIMATED                                           Tri A [facility]  
Work Phone:   
[phone]  
   
                                                    Start:   
2020                              Mri brain brain stem w/o   
contrast material                                   [facility]  
   
                                                    Start:   
2020          NURSING COMMUNICATION                     [facility]  
   
                                                    Start:   
2020          Radex spine thoracic 3 views                     Susan L Alberto  
pold  
Work Phone:   
[phone]  
   
                                                    Start:   
2020                              Radex spine lumbosacral 2/3   
views                                               Susan L Leopold  
Work Phone:   
[phone]  
   
                                                    Start:   
2020                              Radex ankle complete minimum 3   
views                                               Gregory Montejo  
Work Phone:   
[phone]  
   
                                                    Start:   
2020                              Mri brain brain stem w/o   
contrast material                                   Tri A Rethman  
Work Phone:   
[phone]  
   
                                                    Start:   
2020                              INITIATE OXYGEN THERAPY   
PROTOCOL                                            [facility]  
   
                                                    Start:   
2020                              PLACE INTERMITTENT PNEUMATIC   
COMPRESSION DEVICE                                  [facility]  
   
                                                    Start:   
2020          DIET GENERAL                            [facility]  
   
                                                    Start:   
2020          FULL CODE                               [facility]  
   
                                                    Start:   
2020                              INITIATE OXYGEN THERAPY   
PROTOCOL                                            [facility]  
   
                                                    Start:   
2020          IP CONSULT TO NEUROLOGY                     [facility]  
   
                                                    Start:   
2020          NEURO CHECKS                            [facility]  
   
                                                    Start:   
2020                              ORTHOSTATIC BLOOD PRESSURE AND   
PULSE                                               [facility]  
   
                                                    Start:   
2020                              REASON FOR NO MECHANICAL VTE   
PROPHYLAXIS                                         [facility]  
   
                                                    Start:   
2020          SEIZURE PRECAUTIONS                     [facility]  
   
                                                    Start:   
2020          TELEMETRY MONITORING                     [facility]  
   
                                                    Start:   
2020          TOBACCO CESSATION EDUCATION                     [facility]  
   
                                                    Start:   
2020          VITAL SIGNS                             [facility]  
   
                                                    Start:   
2020                              PATIENT STATUS (FROM ED OR   
OR/PROCEDURAL)                                      [facility]  
   
                                                    Start:   
2020          Lactate [Moles/Vol]                     Newton A [address]  
Work Phone:   
[phone]  
   
                                                    Start:   
2020                              Ct head/brain w/o contrast   
material                                            Newton A [address]  
Work Phone:   
[phone]  
   
                                                    Start:   
2020                              Ct abdomen & pelvis w/contrast   
material                                            Newton A [address]  
Work Phone:   
[phone]  
   
                                                    Start:   
2020          Ct thorax w/contrast material                     Newton A [employer]  
Work Phone:   
[phone]  
   
                                                    Start:   
2020                              Ecg routine ecg w/least 12 lds   
w/i&r                                               Newton [address]  
Work Phone:   
[phone]  
   
                                                    Start:   
2020          Assay of ethanol                        Newton A [address]  
Work Phone:   
[phone]  
   
                                                    Start:   
2020          Assay of lipase                         Newton [address]  
Work Phone:   
[phone]  
   
                                                    Start:   
2020          Assay of troponin quantitative                     [facility] [employer]  
Work Phone:   
[phone]  
   
                                                    Start:   
2020                              BASIC METABOLIC PANEL W/ REFLEX   
TO MG FOR LOW K                                     Newton [address]  
Work Phone:   
[phone]  
   
                                                    Start:   
2020                              Blood count complete auto&auto   
difrntl wbc                                         Newton A Matt  
Work Phone:   
[phone]  
   
                                                    Start:   
2020          Hepatic function panel                     Newton CRISPIN Gutierrez  
Work Phone:   
[phone]  
   
                                                    Start:   
2020          Natriuretic peptide                     Newton A Matt  
Work Phone:   
[phone]  
   
                                                    Start:   
2020          Prothrombin time                        Newtonvibha Gutierrez  
Work Phone:   
[phone]  
   
                                                    Start:   
2020                              Thromboplastin time partial   
plasma/whole blood                                  Newtonvibha Gutierrez  
Work Phone:   
[phone]  
   
                                                    Start:   
02-          Mammography                             Alistair Martínez MD  
Work Phone:   
[phone]  
  
  
  
Plan of Treatment  
  
  
                          Date         Care Activity Detail       Author  
   
                          Start: 2037 Hepatitis C screening Hepatitis C sc  
reen [facility]  
   
                                        Comment on above:   Postponed from  (Patient Refused)   
   
                          Start: 2037 HIV screening HIV screen   [facility]  
   
                                        Comment on above:   Postponed from  (Patient Refused)   
   
                                        Start: 2037   Screening for malign  
ant   
neoplasm of cervix        Cervical cancer screen    [facility] [place], [place]  
   
                                        Comment on above:   Postponed from  (Patient Refused)   
   
                                                    Start: 2025  
End: 2025                         Patient encounter   
procedure                               2025 1:15 PM EDT   
Office Visit [facility] [address] [place] [id number] [phone] Alistair Martínez  [address]   
[place]   
[id number] [phone]   
(Work) [phone]   
(Fax)                                   [facility]  
   
                                                    Start: 2025  
End: 2025                         Patient encounter   
procedure                               2025 2:15 PM EST   
Office Visit [facility] [address] [place] [address] [phone] Alistair Martínez  [address]   
[place]   
[id number] [phone]   
(Work) [phone]   
(Fax) Arrived                           [facility]  
   
                                        Comment on above:   Arrived   
   
                                                    Start: 2025  
End: 2026                         Basic metabolic 1998   
panel - Serum or Plasma                 Basic metabolic panel   
Lab Routine Essential   
hypertension, benign   
(CMS/HCC) Expected:   
2025   
(Approximate), Expires:   
2026                              [facility]  
Work Phone:   
[phone]  
   
                                        Comment on above:   Expected: 2025  
 (Approximate), Expires: 2026   
   
                                                    Start: 2025  
End: 2026                         CBC W Auto Differential   
panel - Blood                           CBC and differential   
Lab Routine Encounter   
for long-term current   
use of medication   
Expected: 2025   
(Approximate), Expires:   
2026                              [facility]  
   
                                        Comment on above:   Expected: 2025  
 (Approximate), Expires: 2026   
   
                                                    Start: 2025  
End: 2026                         CT Chest for screening   
WO contrast                             CT lung screening low   
dose Imaging Routine   
Cigarette smoker   
Expected: 2025,   
Expires: 2026                     [facility]  
   
                                        Comment on above:   Expected: 2025  
, Expires: 2026   
   
                                                    Start: 2025  
End: 2026                         Hepatic function 2000   
panel - Serum or Plasma                 Hepatic function panel   
Lab Routine Encounter   
for long-term current   
use of medication   
Expected: 2025   
(Approximate), Expires:   
2026                              [facility]  
   
                                        Comment on above:   Expected: 2025  
 (Approximate), Expires: 2026   
   
                                                    Start: 2025  
End: 2026                         Lipid 1996 panel -   
Serum or Plasma                         Lipid panel Lab Routine   
Encounter for long-term   
current use of   
medication Expected:   
2025   
(Approximate), Expires:   
2026                              [facility]  
   
                                        Comment on above:   Expected: 2025  
 (Approximate), Expires: 2026   
   
                                                    Start: 2025  
End: 2026                         Thyrotropin   
[Units/volume] in Serum   
or Plasma                               TSH Lab Routine Class 1   
obesity due to excess   
calories with serious   
comorbidity and body   
mass index (BMI) of   
31.0 to 31.9 in adult   
Expected: 2025   
(Approximate), Expires:   
2026                              [facility]  
   
                                        Comment on above:   Expected: 2025  
 (Approximate), Expires: 2026   
   
                                                    Start: 2025  
End: 2025                         Patient encounter   
procedure                               2025 10:45 AM EST   
Office Visit [facility] [address] [place] [address] [phone] Alistair Martínez  W   
[place]   
[id number] [phone]   
(Work) [phone]   
(Fax)                                   [facility]   
   
                                                    Start: 2025  
End: 2025                         Patient encounter   
procedure                               2025 1:00 PM EST   
[facility]   
 [address] [place] [address] [phone] Alistair Martínez  [place]   
[place]   
[id number] [phone]   
(Work) [phone]   
(Fax)                                   [facility]   
   
                                                    Start: 10-  
End: 10-           Holter monitor study      Holter monitor Imaging   
Routine Chest pain,   
unspecified type   
Palpitations   
Dyslipidemia (CMS/HCC)   
Expected: 10/28/2024   
(Approximate), Expires:   
10/28/2025                              [facility]  
Work Phone:   
[phone]  
   
                                        Comment on above:   Expected: 10/28/2024  
 (Approximate), Expires: 10/28/2025   
   
                                                    Start: 10-  
End: 2025                         MG Breast - bilateral   
Screening                               Bilateral screening   
mammogram Imaging   
Routine Breast cancer   
screening by mammogram   
Expected: 10/28/2024,   
Expires: 2025                     [facility]  
   
                                        Comment on above:   Expected: 10/28/2024  
, Expires: 2025   
   
                                                    Start: 10-  
End: 10-                         NM Heart Perfusion W   
single state of   
exercise                                Stress test with   
myocardial perfusion   
Cardiac Nuclear   
Medicine Routine Chest   
pain, unspecified type   
Palpitations Essential   
hypertension, benign   
(CMS/HCC) Dyslipidemia   
(CMS/HCC) Expected:   
10/28/2024   
(Approximate), Expires:   
10/28/2026                              [facility]  
   
                                        Comment on above:   Expected: 10/28/2024  
 (Approximate), Expires: 10/28/2026   
   
                                                    Start: 10-  
End: 10-                         Noninvasive colorectal   
cancer DNA and occult   
blood screening   
[Presence] in Stool                     Cologuard colon cancer   
screening Lab Routine   
Colon cancer screening   
Expected: 10/28/2024   
(Approximate), Expires:   
10/28/2025                              [facility]  
   
                                        Comment on above:   Expected: 10/28/2024  
 (Approximate), Expires: 10/28/2025   
   
                                                    Start: 10-  
End: 10-                         Patient encounter   
procedure                               10/28/2024 11:15 AM EDT   
Office Visit [facility] [address] [place] [address] [phone] Alistair Martínez  W   
[address] [place]   
[id number] [phone]   
(Work) [phone]   
(Fax) Arrived                           [facility]  
   
                                        Comment on above:   Arrived   
   
                                                    Start: 2024  
End: 2024                         Patient encounter   
procedure                               2024 2:15 PM EDT   
Office Visit [facility] [address] [place] [address] [phone] Alistair Martínez  W   
[place]   
[phone]   
(Work) [phone]   
(Fax)                                   [facility]  
   
                          Start: 2024 Influenza vaccination Influenza Vacc  
ine (#1) [facility]  
   
                          Start: 2021 Influenza vaccination Flu vaccine (#  
1) [facility]  
   
                                                    Start: 10-  
End: 10-           Office Visit              10/20/2020 Office Visit   
Cardiology Elsa Franks MD [address] [place] [address] [place] [address] [phone] (Fax)                      [facility]   
CARDIOLOGY Part of   
[facility]  
   
                          Start: 2020 Influenza vaccination              [facility] [place], [place]  
   
                          Start: 2020 Lipid panel  Lipid screen [place], [place]  
   
                                                    Start: 2020  
End: 2020           Office Visit              2020 Office Visit   
[facility],   
Severino W, APRN -    
[facility] [place] [address] [phone] (Fax)                      [facility]   
[place]  
   
                                                    Start: 2020  
End: 2020           Office Visit              2020 Office Visit   
Cardiology Elsa Franks MD [address] [facility] [place] [address] [phone] (Fax)                      [facility]   
CARDIOLOGY Part of   
[facility]  
   
                                                    Start: 2020  
End: 2020           Cardiac event monitor     Cardiac event monitor   
Cardiac Services   
Routine Unresponsive   
episode Expected:   
2020   
(Approximate), Expires:   
2020                              [place]  
   
                                        Comment on above:   Expected: 2020  
 (Approximate), Expires: 2020   
   
                                        Start: 02-   Screening for malign  
ant   
neoplasm of breast        Breast cancer screen      [facility]  
   
                                        Start: 02-   Screening for malign  
ant   
neoplasm of breast        Mammogram                 [facility]  
   
                          Start: 2016 Lipid panel  Lipid screen [facility]  
   
                                        Start: 2007   Screening for malign  
ant   
neoplasm of colon                       Colon cancer screen   
colonoscopy                             [place]  
   
                                        Start: 2007   Shingles Vaccine (1   
of   
2)                                      Shingles Vaccine (1 of   
2)                                      [facility]  
   
                                        Start: 2002   Screening for malign  
ant   
neoplasm of colon                       Colon cancer screen   
colonoscopy                             [facility]  
   
                          Start: 1997 Diabetes screen Diabetes screen [facility]  
   
                                        Start: 1987   Screening for malign  
ant   
neoplasm of cervix                                  [facility]  
   
                                        Start: 1978   Screening for malign  
ant   
neoplasm of cervix        Pap smear                 [facility]  
   
                                        Start: 1976   DTaP/Tdap/Td vaccine  
 (1   
- Tdap)                                 DTaP/Tdap/Td vaccine (1   
- Tdap)                                 [facility]  
   
                          Start: 1969 COVID-19 Vaccine (1) COVID-19 Vaccin  
e (1) [facility]  
   
                                        Start: 1963   Pneumococcal 0-64 ye  
ars   
Vaccine (1 of 1 -   
PPSV23)                                 Pneumococcal 0-64 years   
Vaccine (1 of 1 -   
PPSV23)                                 [facility] [place]  
   
                                        Start: 1963   Pneumococcal 0-64 ye  
ars   
Vaccine (1 of 2 -   
PPSV23)                                 Pneumococcal 0-64 years   
Vaccine (1 of 2 -   
PPSV23)                                 [facility]  
   
                                        Start: 1963   Pneumococcal Vaccine  
:   
65+ Years (1 of 2 -   
PCV)                                    Pneumococcal Vaccine:   
65+ Years (1 of 2 -   
PCV)                                    [facility]  
   
                                        Start: 1957   Medicare Annual   
Wellness (AWV)                          Medicare Annual   
Wellness (AWV)                          [facility]  
   
                                        Start: 1957   Screening for malign  
ant   
neoplasm of colon                                   [facility]  
   
                                                      
End: 2020                         Bacteria identified Cx   
Nom (U)                                 Urine Culture   
Microbiology STAT One   
Time for 1 Occurrences   
starting 2020   
until 2020                        [facility] [place]  
   
                                        Comment on above:   One Time for 1 Occur  
rences starting 2020 until   
2020   
   
                                                            Bacteria identified   
Cx   
Nom (U)                                 Urine Culture   
Microbiology STAT   
2020 4:25 AM EDT                  [facility] [place]  
   
                                                            CT Cervical Spine WO  
   
Contrast                                CT Cervical Spine WO   
Contrast Imaging STAT   
2020 1:03 AM EDT                  [facility] [place]  
   
                                                      
End: 2020           Drug screen multi urine   Drug screen multi urine   
Lab STAT One Time for 1   
Occurrences starting   
2020 until   
2020                              [facility] [place]  
   
                                        Comment on above:   One Time for 1 Occur  
rences starting 2020 until   
2020   
   
                                                Drug screen multi urine Drug scr  
een multi urine   
Lab STAT 2020   
4:25 AM EDT                             [facility] [place]  
   
                                                EEG             EEG Neurology Ro  
utine   
2020 1:21 PM EDT                  [facility] [place]  
   
                                                EKG 12 Lead     EKG 12 Lead ECG   
STAT   
2020 11:55 PM EDT                 [place]  
   
                                                            Initiate Oxygen Ther  
apy   
Protocol                                Initiate Oxygen Therapy   
Protocol Respiratory   
Care Routine Daily   
until discontinued   
starting 2020                     [facility] [place]  
   
                                        Comment on above:   Daily until disconti  
nued starting 2020   
   
                                                      
End: 2020                         Troponin I.cardiac   
[Mass/Vol]                              Troponin Lab STAT Now   
Then Every 2hr for 2   
Occurrences starting   
2020 until   
2020, 1 completed                 [facility] [place]  
   
                                        Comment on above:   Now Then Every 2hr f  
or 2 Occurrences starting 2020 until  
   
2020, 1 completed   
   
                                                      
End: 2020                         Urinalysis, reflex to   
microscopic                             Urinalysis, reflex to   
microscopic Lab STAT   
One Time for 1   
Occurrences starting   
2020 until   
2020                              [facility], [place]  
   
                                        Comment on above:   One Time for 1 Occur  
rences starting 2020 until   
2020   
   
                                                            Urinalysis, reflex t  
o   
microscopic                             Urinalysis, reflex to   
microscopic Lab STAT   
2020 4:25 AM EDT                  [facility], [place]  
  
  
  
Payers  
  
  
                          Date         Payer Category Payer        Policy ID  
   
                          2022   Medicaid                  [id number]  
[id number]  
[id number]  
   
                          2022   Medicare                  [id number]  
[id number]  
[id number]  
   
                                2020      Private Health Insurance [facility] [id number]   
2020-Present   
[phone] [address]   
[place] [address]                      xxxxxxxxx   
[id number]  
[id number]  
   
                          2020   Private Health Insurance              [id number]  
[id number]  
   
                                2020      Private Health Insurance [facility] [id number]   
2020-Present   
[phone] [address]   
[place] [address]                      [id number]   
[id number]  
[id number]  
   
                          1960   Medicaid                  [id number]  
   
                          1960   Medicare                  [id number]  
   
                          1957   Unknown                   [id number]   
[id number]  
[id number]  
   
                          1957   Unknown                   [id number]   
[id number]  
[id number]  
   
                          1957   Unknown                   [id number]   
[id number]  
[id number]  
   
                          1957   Unknown                   [id number]   
[id number]  
[id number]  
   
                          1957   Unknown                   [id number]   
[id number]  
[id number]  
   
                          1957   Unknown                   [id number]   
[id number]  
[id number]  
   
                          1957   Unknown                   [id number]   
[id number]  
[id number]  
   
                          1955   Unknown                   [id number]   
[id number]  
[id number]  
  
  
  
Social History  
  
  
                          Date         Type         Detail       Facility  
   
                                                    Start: 2020  
End: 03-                         Tobacco smoking status   
[facility]                                    Current every day   
smoker                                  [place]  
   
                                                    Start: 2020  
End: 2025                         Cigarettes smoked   
current (pack per day) -   
Reported                                            [place]  
   
                                                    Start: 2020  
End: 2020           Alcohol intake            Current non-drinker of   
alcohol (finding)                       [place]  
   
                          Start: 1955 Sex Assigned At Birth Not on file  [place], [place]  
   
                                                            Exposure to SARS-CoV  
-2   
(event)                   Unable to assess          [place]  
   
                                                    Start: 2020  
End: 03-     Tobacco use and exposure Never used          [place]  
   
                                                            Exposure to SARS-CoV  
-2   
(event)                   Not sure                  [place]  
   
                                       History of tobacco use Cigarette Smoker N  
[facility]  
   
                                                    Start: 03-  
End: 2025     Tobacco use panel                       [facility]  
  
  
  
Clinical Notes 2021 to 2025  
 Alistair Martínez MD - 2025 3:08 PM Negrita Martínez MD - 2025 3:08 PM   
Negrita Martínez MD - 2025 3:07 PM Negrita Martínez MD - 2025 3:07 
PM ESTInstructionsAttachments  
  
                                Note Date & Type Note            Facility  
   
                                                    2025 History of Presen  
t   
illness Narrative                       Associated Problem(s): Primary   
osteoarthritis of left wrist  
Formatting of this note might   
be different from the   
original.  
Pain stable and use ultram   
PRN.  
Electronically signed by Alistair Martínez MD at 2025 3:08   
PM EST  
Associated Problem(s): MDD   
(major depressive disorder),   
recurrent episode, mild (HCC)   
(CMS/HCC)  
Formatting of this note might   
be different from the   
original.  
Symptoms stable without   
medication and monitor.  
Electronically signed by Alistair Martínez MD at 2025 3:08   
PM EST  
Associated Problem(s):   
Generalized anxiety disorder   
(CMS/HCC)  
Formatting of this note might   
be different from the   
original.  
Symptoms stable without   
medication and monitor. Use   
hydroxyzine PRN.  
Electronically signed by Alistair Martínez MD at 2025 3:07   
PM EST  
Associated Problem(s):   
Essential hypertension, benign   
(CMS/HCC)  
Formatting of this note might   
be different from the   
original.  
BP controlled and monitor PRN.  
Electronically signed by Alistair Martínez MD at 2025 3:07   
PM EST  
Associated Problem(s): COPD   
(chronic obstructive pulmonary   
disease) (CMS/HCC)  
Formatting of this note might   
be different from the   
original.  
Increased SOB and treat with   
prednisone. Use albuterol PRN.  
Electronically signed by Alistair Martínez MD at 2025 3:07   
PM EST  
Associated Problem(s): Class 1   
obesity due to excess calories   
with serious comorbidity and   
body mass index (BMI) of 31.0   
to 31.9 in adult  
Formatting of this note might   
be different from the   
original.  
Weight loss indicated.  
Electronically signed by Alistair Martínez MD at 2025 3:07   
PM EST  
Associated Problem(s):   
Cigarette smoker  
Formatting of this note might   
be different from the   
original.  
Smoked 1 PPD for over 40   
years. Check LDCT chest.  
Electronically signed by Alistair Martínez MD at 2025 3:07   
PM EST  
Formatting of this note is   
different from the original.  
Images from the original note   
were not included.  
Subjective  
Patient ID: Federica Allred   
is a 69 y.o. female who   
presents for Follow-up and Gas   
(Ongoing for a month).  
Follow up HTN, COPD,   
depression, anxiety, insomnia,   
and wrist pain. Patient feels   
well today. Checking BP PRN   
and typically controlled. BP   
normal today. Taking   
medication daily and   
tolerating without side   
effects. COPD stable with   
inhalers. Still mild SOB with   
exertion. Mild cough but no   
sputum. Still smoking about   
1/2 PPD. Previously smoked 1   
PPD and smoking for over 45   
years. Mood controlled without   
effexor. Not as down or sad   
and not crying as much. Able   
to get out of house and do   
more. Anxiety stable. Not as   
stressed out or overwhelmed.   
Not as nervous or worry as   
much. Not as archuleta or   
irritable. Using hydroxyzine   
PRN and helps when needed.   
Wrist pain stable. Still pain   
in wrist and forearm. Stiff   
and sore in am and pain with   
use. Pain with lifting. Using   
ultram PRN and helps.  
  
Review of Systems  
Respiratory: Negative for   
cough, shortness of breath and   
wheezing.  
Cardiovascular: Negative for   
chest pain and palpitations.  
Gastrointestinal: Negative for   
abdominal pain, diarrhea,   
nausea and vomiting.  
Genitourinary: Negative for   
dysuria.  
  
Objective  
Physical Exam  
Constitutional:  
General: She is not in acute   
distress.  
Appearance: Normal appearance.  
HENT:  
Head: Normocephalic.  
Right Ear: Tympanic membrane   
normal.  
Left Ear: Tympanic membrane   
normal.  
Eyes:  
Extraocular Movements:   
Extraocular movements intact.  
Pupils: Pupils are equal,   
round, and reactive to light.  
Cardiovascular:  
Rate and Rhythm: Normal rate   
and regular rhythm.  
Heart sounds: No murmur heard.  
No friction rub. No gallop.  
Pulmonary:  
Effort: Pulmonary effort is   
normal.  
Breath sounds: Normal breath   
sounds. No wheezing, rhonchi   
or rales.  
Abdominal:  
General: Bowel sounds are   
normal. There is no   
distension.  
Palpations: Abdomen is soft.  
Tenderness: There is no   
abdominal tenderness. There is   
no guarding or rebound.  
Musculoskeletal:  
Cervical back: Neck supple.  
Right lower leg: No edema.  
Left lower leg: No edema.  
Neurological:  
Mental Status: She is alert.  
  
Assessment/Plan  
Problem List Items Addressed   
This Visit  
  
Essential hypertension, benign   
(CMS/HCC) - Primary  
BP controlled and monitor PRN.  
  
  
Relevant Orders  
Basic metabolic panel  
COPD (chronic obstructive   
pulmonary disease) (CMS/HCC)  
Increased SOB and treat with   
prednisone. Use albuterol PRN.  
  
  
Dyslipidemia (CMS/HCC)  
Generalized anxiety disorder   
(CMS/HCC)  
Symptoms stable without   
medication and monitor. Use   
hydroxyzine PRN.  
  
  
MDD (major depressive   
disorder), recurrent episode,   
mild (HCC) (CMS/HCC)  
Symptoms stable without   
medication and monitor.  
  
  
Primary osteoarthritis of left   
wrist  
Pain stable and use ultram   
PRN.  
  
  
Encounter for long-term   
current use of medication  
Relevant Orders  
CBC and differential  
Hepatic function panel  
Lipid panel  
Class 1 obesity due to excess   
calories with serious   
comorbidity and body mass   
index (BMI) of 31.0 to 31.9 in   
adult  
Weight loss indicated.  
  
  
Relevant Orders  
TSH  
Cigarette smoker  
Smoked 1 PPD for over 40   
years. Check LDCT chest.  
  
  
Relevant Orders  
CT lung screening low dose  
  
  
  
  
Electronically signed by Alistair Martínez MD at 2025 3:08   
PM EST  
documented in this encounter            [facility]  
   
                                                    10- History of Presen  
t   
illness Narrative                       Associated Problem(s):   
Palpitations  
Formatting of this note might   
be different from the   
original.  
Frequent symptoms and check   
Holter.  
Electronically signed by Alistair Martínez MD at 10/28/2024   
11:36 AM EDT  
Associated Problem(s):   
Generalized anxiety disorder   
(CMS/HCC)  
Formatting of this note might   
be different from the   
original.  
Symptoms likely adding to pain   
and palpitations. Monitor.  
Electronically signed by Alistair Martínez MD at 10/28/2024   
11:36 AM EDT  
Associated Problem(s):   
Essential hypertension, benign   
(CMS/HCC)  
Formatting of this note might   
be different from the   
original.  
BP controlled and monitor PRN.  
Electronically signed by Alistair Martínez MD at 10/28/2024   
11:36 AM EDT  
Associated Problem(s): Chronic   
idiopathic urticaria  
Formatting of this note might   
be different from the   
original.  
Frequent hives and use   
hydroxyzine PRN.  
Electronically signed by Alistair Martínez MD at 10/28/2024   
11:36 AM EDT  
Associated Problem(s): Chest   
pain  
Formatting of this note might   
be different from the   
original.  
Frequent pain and multiple   
risk factors. Check lexiscan   
cardiolyte stress test. Not   
able to exercise due to joint   
pain.  
Electronically signed by Alistair Martínez MD at 10/28/2024   
11:35 AM EDT  
Formatting of this note is   
different from the original.  
Images from the original note   
were not included.  
Subjective  
Patient ID: Federica Allred   
is a 69 y.o. female who   
presents for Follow-up (Bumps   
all over, er on Fri).  
ER follow up from 10/26 for   
palpitations and chest pain.   
C/o hives off and on for   
weeks. Red, raised bumps on   
face, neck, and trunk. Rash   
would come and go and seemed   
to improve with hydroxyzine.   
Stopped hydroxyzine and rash   
worse. Resumed medication and   
rash improved. Woke up from   
sleep and heart racing. Felt   
like something wrong and   
worried. Heart racing and   
lightheaded. Developed chest   
tightness and hard to catch   
breath. To ER and EKG and   
cardiac enzymes normal. Told   
ER had stress test ordered and   
will complete. Stress test   
ordered years ago for pain but   
never completed. Feels like   
anxiety contributing to   
symptoms. Risk factors include   
HTN and dyslipidemia. Not able   
to tolerate statin due to   
myopathy.  
  
Review of Systems  
Respiratory: Negative for   
cough, shortness of breath and   
wheezing.  
Cardiovascular: Negative for   
chest pain and palpitations.  
Gastrointestinal: Negative for   
abdominal pain, diarrhea,   
nausea and vomiting.  
Genitourinary: Negative for   
dysuria.  
  
Objective  
Physical Exam  
Constitutional:  
General: She is not in acute   
distress.  
Appearance: Normal appearance.  
HENT:  
Head: Normocephalic.  
Right Ear: Tympanic membrane   
normal.  
Left Ear: Tympanic membrane   
normal.  
Eyes:  
Extraocular Movements:   
Extraocular movements intact.  
Pupils: Pupils are equal,   
round, and reactive to light.  
Cardiovascular:  
Rate and Rhythm: Normal rate   
and regular rhythm.  
Heart sounds: No murmur heard.  
No friction rub. No gallop.  
Pulmonary:  
Effort: Pulmonary effort is   
normal.  
Breath sounds: Normal breath   
sounds. No wheezing, rhonchi   
or rales.  
Abdominal:  
General: Bowel sounds are   
normal. There is no   
distension.  
Palpations: Abdomen is soft.  
Tenderness: There is no   
abdominal tenderness. There is   
no guarding or rebound.  
Musculoskeletal:  
Cervical back: Neck supple.  
Right lower leg: No edema.  
Left lower leg: No edema.  
Neurological:  
Mental Status: She is alert.  
  
Assessment/Plan  
Problem List Items Addressed   
This Visit  
  
Essential hypertension, benign   
(CMS/HCC)  
BP controlled and monitor PRN.  
  
  
Relevant Orders  
Stress test with myocardial   
perfusion  
Dyslipidemia (CMS/HCC)  
Relevant Orders  
Holter monitor  
Stress test with myocardial   
perfusion  
Generalized anxiety disorder   
(CMS/HCC)  
Symptoms likely adding to pain   
and palpitations. Monitor.  
  
  
Chronic idiopathic urticaria -   
Primary  
Frequent hives and use   
hydroxyzine PRN.  
  
  
Palpitations  
Frequent symptoms and check   
Holter.  
  
  
Relevant Orders  
Holter monitor  
Stress test with myocardial   
perfusion  
Chest pain  
Frequent pain and multiple   
risk factors. Check lexiscan   
cardiolyte stress test. Not   
able to exercise due to joint   
pain.  
  
  
Relevant Orders  
Holter monitor  
Stress test with myocardial   
perfusion  
  
Other Visit Diagnoses  
  
Breast cancer screening by   
mammogram  
Relevant Orders  
Bilateral screening mammogram  
Colon cancer screening  
Relevant Orders  
Cologuard colon cancer   
screening  
  
  
  
  
  
  
Electronically signed by Alistair Martínez MD at 10/28/2024   
11:37 AM EDT  
documented in this encounter            [facility]  
   
                                                    10- Telephone   
encounter Note                          Formatting of this note might   
be different from the   
original.  
  
Electronically signed by Alistair Martínez MD at 10/17/2024   
11:44 AM EDT  
                                        [facility]  
   
                                                    10- Miscellaneous   
Notes                                   Formatting of this note might   
be different from the   
original.  
  
Electronically signed by Alistair Martínez MD at 10/17/2024   
11:44 AM EDT  
documented in this encounter            [facility]  
   
                                        2023 Note     PROCEDURE: XR WRIST   
LT MIN 3 V  
HISTORY: Pain of left wrist ;   
posterior lateral wrist pain,   
chronic  
COMPARISON: None.  
FINDINGS:  
BONES:Mild degenerative   
changes of the third   
carpal-metacarpal joint. No  
fracture, dislocation, or bone   
lesion. No significant joint   
space widening or  
narrowing of the carpal bones.  
SOFT TISSUES:No visible soft   
tissue swelling.  
EFFUSION:None visible.  
OTHER: Negative.  
IMPRESSION:  
1. No acute bone abnormality   
to account for patient's   
symptoms.  
2. Mild to moderate   
degenerative changes of the   
third carpal-metacarpal joint.  
Electronically authenticated   
by: JENNIFER DELANEY Date:   
2023 14:07                        [facility]  
   
                                                    2021 Hospital DischKendra Raphael MD - 2021  
  
  
Formatting of this note might   
be different from the   
original.  
Continue over-the-counter   
Motrin [id number] mg every 6   
hours as needed for pain. Use   
viscous lidocaine/Cetacaine   
mixtureapply a small amount   
topically on a cotton swab for   
30 minutes every 4 hours as   
needed for pain. When you run   
out of this you may use   
over-the-counter products such   
as Orajel also apply a small   
amount on a cotton swab   
topically for 30 minutes every   
4-6 hours for pain. Penicillin   
as directed till complete.   
Keep your appointment in 2   
weeks with your dentist but   
see if you can follow-up   
sooner. Return immediately for   
any worsening pain any redness   
or swelling fevers chills or   
any other acute concerns  
  
  
  
The following attachments   
cannot be sent through Care   
Everywhere.[facility]   
(English)documented in this   
encounter                               [employer] Phone: [phone]  
  
  
  
                                                    Evaluation note   
  
  
  
                                                    Diagnosis  
   
                                                      
  
  
Dental decay- Primary  
  
  
Unspecified dental caries  
   
                                                      
  
  
Pain, dental  
  
  
Unspecified disorder of the teeth and supporting structures  
  
documented in this encounter  
[employer] Phone: [phone] note*   
  
                                                    Diagnosis  
   
                                                      
  
  
Chronic post-traumatic headache, not intractable- Primary  
   
                                                      
  
  
Essential hypertension, benign (CMS/HCC)  
  
  
Essential hypertension, benign  
   
                                                      
  
  
Personal history of traumatic brain injury  
   
                                                      
  
  
Dental abscess  
  
  
Periapical abscess without sinus  
   
                                                      
  
  
Chronic obstructive pulmonary disease, unspecified COPD type (CMS/HCC)  
   
                                                      
  
  
Primary osteoarthritis of left wrist  
  
documented in this encounter  
[facility]   
  
                                                    Diagnosis  
   
                                                      
  
  
Chronic post-traumatic headache, not intractable- Primary  
   
                                                      
  
  
Essential hypertension, benign (CMS/HCC)  
  
  
Essential hypertension, benign  
   
                                                      
  
  
Personal history of traumatic brain injury  
   
                                                      
  
  
Dental abscess  
  
  
Periapical abscess without sinus  
   
                                                      
  
  
Chronic obstructive pulmonary disease, unspecified COPD type (CMS/HCC)  
   
                                                      
  
  
Chronic idiopathic urticaria- Primary  
  
  
Idiopathic urticaria  
   
                                                      
  
  
Chest pain, unspecified type  
   
                                                      
  
  
Palpitations  
   
                                                      
  
  
Generalized anxiety disorder (CMS/HCC)  
  
  
Generalized anxiety disorder  
   
                                                      
  
  
Essential hypertension, benign (CMS/HCC)  
  
  
Essential hypertension, benign  
   
                                                      
  
  
Dyslipidemia (CMS/HCC)  
  
  
Other and unspecified hyperlipidemia  
   
                                                      
  
  
Breast cancer screening by mammogram  
   
                                                      
  
  
Colon cancer screening  
  
  
Special screening for malignant neoplasms, colon  
  
documented in this encounter  
[facility]   
  
                                                    Diagnosis  
   
                                                      
  
  
Chronic post-traumatic headache, not intractable- Primary  
   
                                                      
  
  
Essential hypertension, benign (CMS/HCC)  
  
  
Essential hypertension, benign  
   
                                                      
  
  
Personal history of traumatic brain injury  
   
                                                      
  
  
Dental abscess  
  
  
Periapical abscess without sinus  
   
                                                      
  
  
Chronic obstructive pulmonary disease, unspecified COPD type (CMS/HCC)  
   
                                                      
  
  
Chronic idiopathic urticaria- Primary  
  
  
Idiopathic urticaria  
   
                                                      
  
  
Chest pain, unspecified type  
   
                                                      
  
  
Palpitations  
   
                                                      
  
  
Generalized anxiety disorder (CMS/HCC)  
  
  
Generalized anxiety disorder  
   
                                                      
  
  
Essential hypertension, benign (CMS/HCC)  
  
  
Essential hypertension, benign  
   
                                                      
  
  
Dyslipidemia (CMS/HCC)  
  
  
Other and unspecified hyperlipidemia  
   
                                                      
  
  
Breast cancer screening by mammogram  
   
                                                      
  
  
Colon cancer screening  
  
  
Special screening for malignant neoplasms, colon  
   
                                                      
  
  
Primary osteoarthritis of left wrist  
  
documented in this encounter  
[facility]   
  
                                                    Diagnosis  
   
                                                      
  
  
Chronic post-traumatic headache, not intractable- Primary  
   
                                                      
  
  
Essential hypertension, benign (CMS/HCC)  
  
  
Essential hypertension, benign  
   
                                                      
  
  
Personal history of traumatic brain injury  
   
                                                      
  
  
Dental abscess  
  
  
Periapical abscess without sinus  
   
                                                      
  
  
Chronic obstructive pulmonary disease, unspecified COPD type (CMS/HCC)  
   
                                                      
  
  
Chronic idiopathic urticaria- Primary  
  
  
Idiopathic urticaria  
   
                                                      
  
  
Chest pain, unspecified type  
   
                                                      
  
  
Palpitations  
   
                                                      
  
  
Generalized anxiety disorder (CMS/HCC)  
  
  
Generalized anxiety disorder  
   
                                                      
  
  
Essential hypertension, benign (CMS/HCC)  
  
  
Essential hypertension, benign  
   
                                                      
  
  
Dyslipidemia (CMS/HCC)  
  
  
Other and unspecified hyperlipidemia  
   
                                                      
  
  
Breast cancer screening by mammogram  
   
                                                      
  
  
Colon cancer screening  
  
  
Special screening for malignant neoplasms, colon  
   
                                                      
  
  
Primary osteoarthritis of left wrist  
  
documented in this encounter  
[facility]   
  
                                                    Diagnosis  
   
                                                      
  
  
Primary osteoarthritis of left wrist  
  
documented in this encounter  
[facility]   
  
                                                    Diagnosis  
   
                                                      
  
  
Chronic post-traumatic headache, not intractable- Primary  
   
                                                      
  
  
Essential hypertension, benign (CMS/HCC)  
  
  
Essential hypertension, benign  
   
                                                      
  
  
Personal history of traumatic brain injury  
   
                                                      
  
  
Dental abscess  
  
  
Periapical abscess without sinus  
   
                                                      
  
  
Chronic obstructive pulmonary disease, unspecified COPD type (CMS/HCC)  
   
                                                      
  
  
Chronic idiopathic urticaria- Primary  
  
  
Idiopathic urticaria  
   
                                                      
  
  
Chest pain, unspecified type  
   
                                                      
  
  
Palpitations  
   
                                                      
  
  
Generalized anxiety disorder (CMS/HCC)  
  
  
Generalized anxiety disorder  
   
                                                      
  
  
Essential hypertension, benign (CMS/HCC)  
  
  
Essential hypertension, benign  
   
                                                      
  
  
Dyslipidemia (CMS/HCC)  
  
  
Other and unspecified hyperlipidemia  
   
                                                      
  
  
Breast cancer screening by mammogram  
   
                                                      
  
  
Colon cancer screening  
  
  
Special screening for malignant neoplasms, colon  
   
                                                      
  
  
Essential hypertension, benign (CMS/HCC)- Primary  
  
  
Essential hypertension, benign  
   
                                                      
  
  
Chronic obstructive pulmonary disease, unspecified COPD type (CMS/HCC)  
   
                                                      
  
  
Primary osteoarthritis of left wrist  
   
                                                      
  
  
MDD (major depressive disorder), recurrent episode, mild (HCC) (CMS/HCC)  
   
                                                      
  
  
Generalized anxiety disorder (CMS/HCC)  
  
  
Generalized anxiety disorder  
   
                                                      
  
  
Dyslipidemia (CMS/HCC)  
  
  
Other and unspecified hyperlipidemia  
   
                                                      
  
  
Encounter for long-term current use of medication  
   
                                                      
  
  
Class 1 obesity due to excess calories with serious comorbidity and body mass 
index   
(BMI) of 31.0 to 31.9 in adult  
   
                                                      
  
  
Cigarette smoker  
  
  
Tobacco use disorder  
  
documented in this encounter  
[facility]  
  
Reason for Referral  
  
  
                          Status       Reason       Specialty    Diagnoses /   
Procedures                              Referred By   
Contact                                 Referred To   
Contact  
   
                                        Open                  
  
  
Specialty   
Services Required         Cardiology                  
  
  
Diagnoses  
  
  
Unresponsive   
episode  
                                          
  
  
Gregory Montejo MD  
  
  
[address] [place] [address]  
  
  
Phone:   
[phone]  
  
  
Fax:   
[phone]                              
  
  
  
  
  
                    Status    Reason    Specialty Diagnoses / Procedures Referre  
d By Contact Referred To   
Contact  
   
                          Open                                     
  
  
Diagnoses  
  
  
Unresponsive episode  
  
  
  
Procedures  
  
  
Cardiac event monitor                     
  
  
Gregory Montejo MD  
  
  
[address] [place] [address]  
  
  
Phone: [phone]  
  
  
Fax: [phone]                         
  
  
  
  
  
Hospital Course  
* Gregory Montejo MD - 2020 11:31 AM EDT  
  
Formatting of this note might be different from the original.  
  
  
Hospital Medicine Discharge Summary  
  
Patient Identification:  
Federica Allred  
: 1955  
MRN: [id number]  
Account: [id number]  
  
Patient's PCP: No primary care provider on file.  
  
Admit Date: 2020  
  
Discharge Date: 2020  
  
Admitting Physician: Mohsin Reza, MD  
  
Discharge Physician: Gregory Montejo MD  
  
Discharge Diagnoses:  
  
Unresponsive episode  
New onset seizure  
Chronic headaches suspect migraine versus tension headache-trial of Topamax  
Gastroesophageal reflux disease with gastritis secondary to NSAIDs  
Lactic acidosis secondary to seizure  
Musculoskeletal pain secondary to seizures  
Left ankle osteoarthritis  
Opiates in urine denies any substance abuse  
Essential hypertension  
Asymptomatic cholelithiasis  
Colonic diverticulosis  
Current smoker  
Right pupil anisocoria, history of childhood trauma, decreased visual acuity  
History of pericarditis pericardial effusion,   
Asthma mild intermittent without exacerbation  
History of renal stones  
History of peptic ulcer disease?  
  
The patient was seen and examined on day of discharge and this discharge summary
 is in conjunction with any daily progress note from day of discharge.  
  
Hospital Course:  
Federica Allred is a 64 y.o. female admitted to [facility] on 
2020 for [facility] for further 
evaluation by neurologist for possible seizures.  
  
Patient apparently stopped taking all her medications, except aspirin for little
 more than 2 years ago especially for elevated blood pressure, since she moved 
to [place] and did not have a physician there and her cardiologist here left the
 area. She continues to smoke more than a pack of cigarettesper day and denies 
drinking alcohol. Patient was out with family members for a picnic and 
apparently patient was having severe headache in the frontal area and also in 
the occipital area and she tooksome sinus pill given by her sister's boyfriend. 
An hour later, apparently she was noticed to have seizure-like activity, lasting
 approximately 7 minutes with tonic-clonic movements and after that she was not 
responding for approximately 20 minutes. Apparently she was noticed by brother 
that she was having shallow breathing's and was little cyanotic and did give her
 CPR and rescue breathsand it is documented in the emergency room that she did 
have a pulse though.  
Patient had CT of the head along with CTA Of the chest and abdomen and there was
 no acute intracranial bleed or PE and incidental cholelithiasis and 
diverticulosis noted.  
  
Meanwhile EMS was called and apparently she was desaturating requiring 
supplemental oxygen and there was no obvious tongue biting or urinary 
incontinence. Patient admits to having some chest/epigastric pain mostly after 
eating and having reflux-like symptoms and has been having some vomiting intermi
ttently. Denies any obvious blood, but sometimes it is little dark.  
  
Patient also complaining of some pain in the left ankle after arriving here. She
 is not sure if shetwisted her ankle. Patient is more alert and awake urine drug
 screen shows opiates and patient denies taking any narcotics. And she is not 
sure what pills she received but thinks it is a sinus pill that she was 
given.Labs showed mildly elevated lactic acid of 2.5  
  
Patient admitted to the hospital and continued on gentle hydration and was 
having generalized body aches and right-sided chest pain and headaches. Patient 
empirically treated with Fioricet. MRI of the brain did not show any acute 
strokes. Patient was started on metoprolol and patient's blood pressure is 
better.  
  
Patient initially refused EEG on the day of admission but subsequently did agree
 to get and was done on  and did not show any epileptiform activity. There 
was no obvious rib fractures noted. Withthe Protonix that was started 
empirically, her gastritis and GERD symptoms are improving and recommended her 
to stop taking NSAIDS. Started the patient on Topamax 25 mg nightly to see if it
 would help with her headaches, as there is suspicion for migraine.  
  
Patient had a 2D echo done and the official report is pending, the etiology of 
the seizure was not clear and since it is the first episode not treating her 
with any antiepileptic medications. Recommended her not to drive for at least 3 
months and patient stated that she does not drive. Her pain in the left ankle is
 from osteoarthritis from previous surgery and recommended to follow with PCP 
and see a podiatrist or orthopedic specialist if worsening. Counseled about 
smoking cessation. 30-day event monitor is being placed and a follow-up 
arrangement with cardiologist will be made at the time ofdischarge.  
  
Exam:  
  
Vitals:  
Vitals:  
20 2053 20 2334 20 0407 20 0800  
BP: (!) 151/80 (!) 143/77 134/72 (!) 144/75  
Pulse: 74 66 66 63  
Resp: 18 18 14 19  
Temp: 97.8 F (36.6 C) 96.9 F (36.1 C) 97.6 F (36.4 C) 97.4 F (36.3 C)  
TempSrc: Oral Axillary Oral Oral  
SpO2: 96% 95% 95% 97%  
Weight: 218 lb 11.2 oz (99.2 kg)  
Height:  
  
Weight: Weight: 218 lb 11.2 oz (99.2 kg)  
  
24 hour intake/output:  
  
Intake/Output Summary (Last 24 hours) at 2020 1133  
Last data filed at 2020 1125  
Gross per 24 hour  
Intake 2743.51 ml  
Output 400 ml  
Net 2343.51 ml  
  
Physical exam: please see progress notes  
  
Labs: For convenience and continuity at follow-up the following most recent labs
 are provided:  
  
CBC:  
Lab Results  
Component Value Date  
WBC 8.4 2020  
HGB 11.4 2020  
HCT 35.4 2020  
 2020  
  
Renal:  
Lab Results  
Component Value Date  
 2020  
K 4.2 2020  
 2020  
CO2 23 2020  
BUN 20 2020  
CREATININE 0.7 2020  
CALCIUM 8.4 2020  
PHOS 3.6 2015  
  
Significant Diagnostic Studies  
  
Radiology:  
XR THORACIC SPINE (3 VIEWS)  
Final Result  
Normal thoracic spine.  
  
  
  
**This report has been created using voice recognition software. It may contain 
minor errors which are inherent in voice recognition technology.**  
  
Final report electronically signed by Dr. Henrik Macias on 2020 11:24 AM  
  
XR LUMBAR SPINE (2-3 VIEWS)  
Final Result  
1. Slight thoracolumbar levoscoliosis. Mild degenerative spurring 
anterior/inferior corner of L2.  
2. No fracture. Disc spaces well-maintained.  
3. Moderate multifocal degenerative facet arthropathy lower 3 lumbar levels. 
Sacroiliac joints unremarkable.  
  
  
  
**This report has been created using voice recognition software. It may contain 
minor errors which are inherent in voice recognition technology.**  
  
Final report electronically signed by Dr. Henrik Macias on 2020 11:23 AM  
  
XR ANKLE LEFT (MIN 3 VIEWS)  
Final Result  
1. There is no acute fracture.  
2. There is mild soft tissue swelling along the lateral malleolus.  
  
  
  
**This report has been created using voice recognition software. It may contain 
minor errors which are inherent in voice recognition technology.**  
  
Final report electronically signed by Dr. Win Freeman on 2020 11:54 AM  
  
MRI BRAIN WO CONTRAST  
Final Result  
  
1. Minimal severity chronic small vessel ischemic changes.  
2. No acute findings.  
  
  
  
  
**This report has been created using voice recognition software. It may contain 
minor errors which are inherent in voice recognition technology.**  
  
Final report electronically signed by Dr. Ara Guerrero on 2020 10:57 AM  
  
XR RIBS RIGHT (2 VIEWS) (Results Pending)  
  
  
  
Consults:  
  
IP CONSULT TO NEUROLOGY  
  
Disposition: Home  
Condition at Discharge: Stable  
  
Code Status: Full Code  
  
Patient Instructions:  
Discharge lab work: none  
Activity: activity as tolerated  
Diet: DIET GENERAL;  
Dietary Nutrition Supplements: Clear Liquid Oral Supplement  
  
Follow-up visits:  
Dr. Nuñez and Rivera De La Vega, TOBY Nuñez is not accepting patients but his partner has availability. You must 
get an application from office, complete, and mail back prior to apt being made.  
[address] [place] [address]  [place], [place]  
[phone]  
  
  
  
Discharge Medications:  
  
Federica Allred  
Home Medication Instructions MINNIE:[id number]  
Printed on:20 1133  
Medication Information  
  
metoprolol tartrate (LOPRESSOR) 25 MG tablet  
Take 1 tablet by mouth 2 times daily  
  
pantoprazole (PROTONIX) 40 MG tablet  
Take 1 tablet by mouth every morning (before breakfast) To take on empty stomach
 45 min before eating,  
  
topiramate (TOPAMAX) 25 MG tablet  
Take 1 tablet by mouth nightly  
  
  
Time Spent on discharge is more than 34 min in the examination, evaluation, 
counseling and review of medications and discharge plan.  
  
Signed:  
  
Thank you No primary care provider on file. for the opportunity to be involved 
in this patient's care.  
  
Electronically signed by Gregory Montejo MD on 2020 at 11:33 AM  
  
  
  
  
  
Electronically signed by Gregory Montejo MD at 2020 3:19 PM EDT  
  
  
documented in this encounter  
  
History of Present Illness  
* Schwab, Allison C, RD, LD - 2020 11:56 AM EDT  
  
  
Nutrition Assessment  
  
Type and Reason for Visit: Initial, Positive Nutrition Screen(nausea/vomiting)  
  
Nutrition Recommendations:  
Continue current diet.  
ONS initiated: Ensure Clear TID.  
Consider MVI if pt able to tolerate.  
  
Nutrition Assessment: Pt. nutritionally compromised AEB reports of poor oral 
intake over the last year or more due to GERD, nausea, vomiting. At risk for 
further nutrition compromise r/t continued issues with GERD, nausea, vomiting, 
history of gastric ulcer, and underlying medical condition (seizure, history of 
GERD, HTN, smoking), and need for nutrition support. Nutrition 
recommendations/interventions as per above.  
  
Malnutrition Assessment:  
Malnutrition Status: At risk for malnutrition  
Context: Chronic illness  
Findings of the 6 clinical characteristics of malnutrition (Minimum of 2 out of 
6 clinical characteristics is required to make the diagnosis of moderate or 
severe Protein Calorie Malnutrition based on AND/ASPEN Guidelines):  
1. Energy Intake-Less than or equal to 75% of estimated energy requirement, 
Greater than or equal to 1 month  
2. Weight Loss-No significant weight loss,  
3. Fat Loss-No significant subcutaneous fat loss,  
4. Muscle Loss-No significant muscle mass loss,  
5. Fluid Accumulation-No significant fluid accumulation,  
6.  Strength-Not measured  
  
Nutrition Risk Level: Moderate  
  
Nutrient Needs:  
Estimated Daily Total Kcal: 5920-1565 kcals (15-18 kcals/kg/day based on 99 kg)  
Estimated Daily Protein (g):  grams (1.2-2 grams/kg/day based on 59 kg 
IBW)  
  
Nutrition Diagnosis:  
Problem: Inadequate oral intake  
Etiology: related to Alteration in GI function  
? Signs and symptoms: as evidenced by Diet history of poor intake, Patient 
report of, Nausea, Vomiting  
  
Objective Information:  
Nutrition-Focused Physical Findings: Obese. Pt seen, reports issues daily for 
over a year with reflux, nausea/vomiting. History of ulcer and GERD. Reports she
 hasn't had recent visit to GI or doctor about current GI issues. Admitted with 
seizure. Admits she drinks pots of regular coffee, pepper, etc. Stated when she 
cut back on coffee her symptoms improved. Plans to see GI. On protonix, IVF's, 
zofran, phenergan. Agreed to Ensure Clear TID. Denies any weight loss.  
Wound Type: None  
Current Nutrition Therapies:  
Oral Diet Orders: General  
Oral Diet intake: %  
Oral Nutrition Supplement (ONS) Orders: Clear Liquid Oral Supplement(Ensure 
Clear TID)  
ONS intake: Unable to assess  
Anthropometric Measures:  
Ht: 5' 6  (167.6 cm)  
Current Body Wt: 218 lb 11.2 oz (99.2 kg)(20 no edema)  
Admission Body Wt: 209 lb (94.8 kg)(20 no edema bedscale)  
Usual Body Wt: (182-185# per pt. Per EMR: 3/17/16: 181# )  
% Weight Change: , Pt denies any significant weight changes, no recent EMR 
weights to verify  
Ideal Body Wt: 130 lb (59 kg),  
BMI Classification: BMI 35.0 - 39.9 Obese Class II  
  
Nutrition Interventions:  
Continue current diet, Start ONS  
Continued Inpatient Monitoring, Education Completed, Coordination of Care(GERD 
diet recommendationsreviewed, handout provided as well 20)  
  
Nutrition Evaluation:  
Evaluation: Goals set  
Goals: Patient will tolerate current diet and consume 75% or more of meals 
during LOS.  
Monitoring: Meal Intake, Supplement Intake, Diet Tolerance, Weight, Pertinent 
Labs, Nausea or Vomiting  
  
Electronically signed by Allison C Schwab, RD, RONALD on 20 at 11:56 AM EDT  
  
Contact Number: [phone]  
  
  
  
  
  
Electronically signed by Allison C Schwab, RD, RONALD at 2020 12:01 PM EDT  
  
  
* Unique Leo - 2020 11:39 AM EDT  
  
Echo completed, ROW Dr Vasquez to read.  
  
  
  
Electronically signed by Unique Leo at 2020 11:40 AM EDT  
  
  
* Kayser, Shelly A, RCP - 2020 10:52 AM EDT  
  
Formatting of this note might be different from the original.  
[facility]  
Neurodiagnostic Laboratory Technician Worksheet  
EEG Date: 2020  
  
Name: Federica Allred : 1955 Age: 64 y.o. SEX: female  
  
ROOM: [facility] MRN: [id number] CSN: [id number]  
  
Ordering Provider: FRAHAN EEG Number: [id number] Time of Test: 831  
  
Hand: Right Sedation: no  
  
H.V. Done: No NOT DONE Photic: Yes  
  
Sleep: No Drowsy: Yes Sleep Deprived: No  
  
Seizures observed: no  
  
Mentality: alert  
  
Clinical History:PATIENT HAD SEVER H/A, TOOK SINUS PILL THEN 1 HOUR LATER HAD A 
TONIC CLINIC SEIZURE LASTING 7 MINUTES. WAS UNRESPONSIVE FOR 20 MINUTES AFTER 
AND WAS CYANOTIC AND WAS GIVEN RESCUE BREATHS.  
  
Past Medical History:  
  
Diagnosis Date  
Asthma  
GERD (gastroesophageal reflux disease)  
Headache  
Hypertension  
Pneumonia  
  
Scheduled Meds:  
metoprolol tartrate 25 mg Oral BID  
sodium chloride flush 10 mL Intravenous 2 times per day  
pantoprazole 40 mg Oral QAM AC  
nicotine 1 patch Transdermal Daily  
  
Continuous Infusions:  
PRN Meds:.sodium chloride flush, acetaminophen **OR** acetaminophen, 
promethazine **OR** ondansetron, potassium chloride **OR** potassium alternative
 oral replacement **OR** potassium chloride, bisacodyl, 
butalbital-acetaminophen-caffeine  
  
Technician: Shelly A Kayser 2020  
  
  
  
  
  
  
  
Electronically signed by Shelly A Kayser, RCP at 2020 10:56 AM EDT  
  
  
* Gregory Montejo MD - 2020 10:32 AM EDT  
  
Formatting of this note might be different from the original.  
  
  
Hospitalist Progress Note  
  
Patient: Federica CRISPIN [facility]  
  
Unit/Bed:[address] [place] [address]-  
  
YOB: 1955  
  
MRN: [id number]  
  
Acct: [id number]  
  
PCP: No primary care provider on file.  
  
Date of Admission: 2020  
  
Assessment/Plan:  
  
Unresponsive episode with seizure like activity: Suspect new onset seizure , ? 
Etiology not clear  
MRI of the brain, neurology consultation-, consider EEG  
 MRI is normal, appreciate neurology input, EEG done this morning patient 
refused yesterday  
-Awaiting 2D echo, might need cardiac monitor on discharge  
  
Chronic headaches? Migraine versus tension, follow with neurology to see if 
patient would benefit from Topamax.  
  
Asymptomatic bacteriuria-will not treat  
  
Gastroesophageal reflux disease with gastritis: Suspect NSAID related  
empiric PPI, if symptoms not improving more than a month will need EGD and GI 
referral  
, continue PPI if symptoms improving  
  
Lactic acidosis most likely secondary to seizure, gentle hydration, r resolved  
  
  
Musculoskeletal pain in the chest-most likely from CPR and also seizure  
-Chest x-ray to rule out fracture  
  
Left ankle sprain rule out fracture  
No obvious fracture noted, splint helping,-recommended to follow with PCP as it 
is all arthritis and might benefit from podiatry or orthopedic input  
  
Opiates in urine? Denies taking any pain medications,  
  
Essential hypertension noncompliant with medications: Started back on her 
metoprolol that she was on prior  
 blood pressure better controlled  
  
Asymptomatic cholelithiasis  
Colonic diverticulosis  
Current smoker counseling done  
  
  
Right pupil anisocoria, history of childhood trauma, decreased visual acuity  
  
History of pericarditis pericardial effusion,   
Asthma mild intermittent without exacerbation  
History of renal stones  
History of peptic ulcer disease?  
  
Expected discharge date: Today  
  
Disposition: [x] Home  
[] TCU  
[] Rehab  
[] Psych  
[] SNF  
[] Long Term Care Facility  
[] Other-  
  
Chief Complaint: Patient transferred from [place], emergency room for further 
evaluation by neurologist for possible seizures.  
  
Patient apparently stopped taking all her medications, except aspirin for little
 more than 2 years ago especially for elevated blood pressure, since she moved 
to [place] and did not have a physician there and her cardiologist here left the
 area. She continues to smoke more than a pack of cigarettesper day and denies 
drinking alcohol. Patient was out with family members for a picnic and 
apparently patient was having severe headache in the frontal area and also in 
the occipital area and she tooksome sinus pill given by her sister's boyfriend. 
An hour later, apparently she was noticed to have seizure-like activity, lasting
 approximately 7 minutes with tonic-clonic movements and after that she was not 
responding for approximately 20 minutes. Apparently she was noticed by brother 
that she was having shallow breathing's and was little cyanotic and did give her
 CPR and rescue breathsand it is documented in the emergency room that she did 
have a pulse though.  
Patient had CT of the head along with CTA Of the chest and abdomen and there was
 no acute intracranial bleed or PE and incidental cholelithiasis and 
diverticulosis noted.  
  
Meanwhile EMS was called and apparently she was desaturating requiring 
supplemental oxygen and there was no obvious tongue biting or urinary 
incontinence. Patient admits to having some chest/epigastric pain mostly after 
eating and having reflux-like symptoms and has been having some vomiting intermi
ttently. Denies any obvious blood, but sometimes it is little dark.  
  
Patient also complaining of some pain in the left ankle after arriving here. She
 is not sure if shetwisted her ankle. Patient is more alert and awake urine drug
 screen shows opiates and patient denies taking any narcotics. And she is not 
sure what pills she received but thinks it is a sinus pill that she was 
given.Labs showed mildly elevated lactic acid of 2.5  
  
Hospital Course: Patient admitted to the hospital and continued on gentle 
hydration, patient was complaining of body aches and headache and was placed on 
Fioricet. MRI of the brain did not show any abnormality or previous strokes. 
Blood pressure is better controlled with metoprolol.  
  
Patient refused EEG on the day of admission and agreed to get it done today. Did
 explain the reasonfor EEG . Getting chest x-ray to rule out fracture on the 
ribs as patient has persistent pain on the right side. Counseled about smoking 
cessation. No ankle fracture but osteoarthritis and osteophytes noted. 
Recommended to follow with PCP and might benefit from orthopedics or podiatry 
referral, close to her residence.  
Patient also having persistent headaches, cannot rule out migraines  
Follow with neurology-, may benefit from Topamax  
Also get 2D echo, will place a 30-day event monitor as the etiology-was not 
determined and cannot rule out any secondary cardiac etiology.  
Continue PPI patient is getting some benefit, no NSAIDS on DC, most likely today  
  
Subjective (past 24 hours): Patient complaining of pain in the chest anteriorly 
that she received CPR and also in the right side on the ribs and generalized 
body aches, also having headaches in the frontal, around 5-10, Fioricet is not
 helping it except taking the edge away, no more epigastric pain, no numbness or
 weakness or tingling  
  
Medications: Reviewed  
  
Infusion Medications  
Scheduled Medications  
metoprolol tartrate 25 mg Oral BID  
sodium chloride flush 10 mL Intravenous 2 times per day  
pantoprazole 40 mg Oral QAM AC  
nicotine 1 patch Transdermal Daily  
  
PRN Meds: sodium chloride flush, acetaminophen **OR** acetaminophen, 
promethazine **OR** ondansetron, potassium chloride **OR** potassium alternative
 oral replacement **OR** potassium chloride, bisacodyl, 
butalbital-acetaminophen-caffeine  
  
Intake/Output Summary (Last 24 hours) at 2020 1032  
Last data filed at 2020 1023  
Gross per 24 hour  
Intake 2623.51 ml  
Output 400 ml  
Net 2223.51 ml  
  
Diet:  
DIET GENERAL;  
Dietary Nutrition Supplements: Clear Liquid Oral Supplement  
  
Exam:  
BP (!) 144/75   Pulse 63   Temp 97.4 F (36.3 C) (Oral)   Resp 19   Ht 5' 6  
(1.676 m)   Wt 218 lb 11.2 oz (99.2 kg)   LMP (LMP Unknown)   SpO2 97%   BMI 
35.30 kg/m  
  
Physical Examination:  
General appearance - alert, awake and in mild distress from pain in the right 
side of chest wall, obese, bruising on right side of face just lateral to the 
eyebrow  
HEENT: Normocephalic, Atraumatic, pupils reactive,  
Right pupil anisocoria, decreased vision, mucous membranes moist  
Chest - moving equally to respiration,symmetric air entry, clear to 
auscultation, chest wall pain in middle and right side and back  
Heart - normal rate, regular rhythm, normal S1, S2, no murmurs,  
Abdomen - soft, nontender, mild distended, no masses or organomegaly, BS+  
Neurological - alert, oriented, normal speech, sensations intact and able to 
move all extremities  
Extremities - peripheral pulses normal, no pedal edema, Capillary refill less 
than 3 sec  
Skin - normal coloration and turgor, no rashes  
  
Labs:  
Recent Labs  
20  
2350 20  
1250 20  
0353  
WBC 8.4 7.8 8.4  
HGB 13.5 13.8 11.4*  
HCT 41.6 37.9 35.4*  
 200 178  
  
Recent Labs  
20  
2350 20  
1250 20  
0353  
 134* 134*  
K 4.0 4.2 4.2  
CL 98 97* 101  
CO2 25 22* 23  
BUN 20 17 20  
CREATININE 1.04* 0.9 0.7  
CALCIUM 8.7 9.4 8.4*  
  
Recent Labs  
20  
2350  
AST 22  
ALT 17  
BILIDIR <0.08  
BILITOT 0.20*  
ALKPHOS 91  
  
Recent Labs  
20  
2350  
INR 1.0  
  
Recent Labs  
20  
1250  
CKTOTAL 109  
  
No results for input(s): PROCAL in the last 72 hours.  
  
Microbiology:  
  
Urinalysis:  
  
Lab Results  
Component Value Date  
NITRU POSITIVE 2020  
WBCUA 0 TO 2 2020  
BACTERIA 3+ 2020  
RBCUA 0 TO 2 2020  
RBCUA 3-5 2015  
BLOODU NEGATIVE 2015  
SPECGRAV 1.020 2020  
GLUCOSEU NEGATIVE 2020  
  
Radiology:  
XR THORACIC SPINE (3 VIEWS)  
Final Result  
Normal thoracic spine.  
  
  
  
**This report has been created using voice recognition software. It may contain 
minor errors which are inherent in voice recognition technology.**  
  
Final report electronically signed by Dr. Henrik Macias on 2020 11:24 AM  
  
XR LUMBAR SPINE (2-3 VIEWS)  
Final Result  
1. Slight thoracolumbar levoscoliosis. Mild degenerative spurring 
anterior/inferior corner of L2.  
2. No fracture. Disc spaces well-maintained.  
3. Moderate multifocal degenerative facet arthropathy lower 3 lumbar levels. 
Sacroiliac joints unremarkable.  
  
  
  
**This report has been created using voice recognition software. It may contain 
minor errors which are inherent in voice recognition technology.**  
  
Final report electronically signed by Dr. Henrik Macias on 2020 11:23 AM  
  
XR ANKLE LEFT (MIN 3 VIEWS)  
Final Result  
1. There is no acute fracture.  
2. There is mild soft tissue swelling along the lateral malleolus.  
  
  
  
**This report has been created using voice recognition software. It may contain 
minor errors which are inherent in voice recognition technology.**  
  
Final report electronically signed by Dr. Win Freeman on 2020 11:54 AM  
  
MRI BRAIN WO CONTRAST  
Final Result  
  
1. Minimal severity chronic small vessel ischemic changes.  
2. No acute findings.  
  
  
  
  
**This report has been created using voice recognition software. It may contain 
minor errors which are inherent in voice recognition technology.**  
  
Final report electronically signed by Dr. Ara Guerrero on 2020 10:57 AM  
  
XR RIBS RIGHT (2 VIEWS) (Results Pending)  
  
DVT prophylaxis: [] Lovenox  
[] SCDs  
[] SQ Heparin  
[x] Encourage ambulation  
[] Already on Anticoagulation  
  
Code Status: Full Code  
  
Tele: [x] yes  
[] no  
  
Active Hospital Problems  
Diagnosis Date Noted  
New onset seizure (HCC) [R56.9] 2020  
Hypertension [I10]  
Headache [R51]  
  
Electronically signed by Gregory Montejo MD on 2020 at 10:32 AM  
  
  
  
  
Electronically signed by Gregory Montejo MD at 2020 10:46 AM EDT  
  
  
* Valerie Arreguin, RN - 2020 9:29 AM EDT  
  
Patient was admitted for possible seizure on . Fell at a picnic and brother 
is a EMT and did compressions on her till EMS arrived. Lactic was elevated on 
admission and was given gentle hydration.CT head/spine/chest all negative. 
Patient moved to [place] about 2 years ago and stopped taking allmedications. 
Blood pressure was elevated on admission. Hospitalist resumed old dose of 
metoprolol and blood pressure has improved. Takes pills WWW. Plan is to have EEG
 today.  
  
0746 Assessment completed. Complains of soreness in the chest from compressions.  
  
0800 Vital signs are stable.  
  
0812 Meds given per MAR.  
  
0820 EEG in progress.  
  
0915 Patient does not have her phone with her, her brother has it and she would 
like for us to giveupdates but only when they call due to the emergency contact 
is not updated with correct phone numbers as well.  
  
09 PS neuro, hospitalist requested that we get their recommendations on 
migraine medication for the patientHospitalist stated that she does not need to 
drive for 2 weeks and no ibuprofen..  
  
09 Called XR and they will come and  patient for the x-ray.  
  
0945 Echo ordered. Called echo department and let them know of changes.  
  
1000 Patient was able to find a family member's phone number. Andria- her brother,
 [phone]. Updates given.  
  
1145 Received discharge orders. Completed follow up appointments. Patient 
prefers to go to [place]. It is closer to home. She said that she gets her meds 
filled at [facility]. Gave scripts.  
  
1215 Called brother, Andria, he is about 3 hours away and will start heading this 
way.  
  
1415 Hospitalist was rounding and stated that the Echo has to be read before the
 patient can be discharged.  
  
1425 Called Echo and requested that the echo be read STAT.  
  
  
  
  
  
Electronically signed by Valerie Arreguin RN at 2020 2:36 PM EDT  
  
  
* Christen Walker RCP - 2020 2:11 PM EDT  
  
EEG tech entered the room and explained the procedure. Patient began asking if 
this test is really necessary. She stated that she already had other tests this 
morning. She then had an episode of anxiety and refused the test. She states 
that she has to talk with her brother first for advice.  
  
  
  
Electronically signed by Christen Walker RCP at 2020 2:15 PM EDT  
  
  
* Gregory Montejo MD - 2020 9:04 AM EDT  
  
I have seen and examined the patient admitted by my colleague early hours of 
this morning. Patient transferred from [facility] emergency room for further 
evaluation by neurologist for possible seizures.  
  
Patient apparently stopped taking all her medications, except aspirin for little
 more than 2 years ago especially for elevated blood pressure, since she moved 
to [place] and did not have a physician there and her cardiologist here left the
 area. She continues to smoke more than a pack of cigarettesper day and denies 
drinking alcohol. Patient was out with family members for a picnic and 
apparently patient was having severe headache in the frontal area and also in 
the occipital area and she tooksome sinus pill given by her sister's boyfriend. 
An hour later, apparently she was noticed to have seizure-like activity, lasting
 approximately 7 minutes with tonic-clonic movements and after that she was not 
responding for approximately 20 minutes. Apparently she was noticed by brother 
that she was having shallow breathing's and was little cyanotic and did give her
 CPR and rescue breathsand it is documented in the emergency room that she did 
have a pulse though.  
Patient had CT of the head along with CTA Of the chest and abdomen and there was
 no acute intracranial bleed or PE and incidental cholelithiasis and 
diverticulosis noted.  
  
Meanwhile EMS was called and apparently she was desaturating requiring 
supplemental oxygen and there was no obvious tongue biting or urinary 
incontinence. Patient admits to having some chest/epigastric pain mostly after 
eating and having reflux-like symptoms and has been having some vomiting intermi
ttently. Denies any obvious blood, but sometimes it is little dark.  
  
Patient also complaining of some pain in the left ankle after arriving here. She
 is not sure if shetwisted her ankle. Patient is more alert and awake urine drug
 screen shows opiates and patient denies taking any narcotics. And she is not 
sure what pills she received but thinks it is a sinus pill that she was given.  
Labs showed mildly elevated lactic acid of 2.5  
  
Unresponsive episode with seizure like activity: Suspect new onset seizure , ? 
Etiology not clear  
MRI of the brain, neurology consultation-, consider EEG  
  
Gastroesophageal reflux disease with gastritis: Empiric PPI, if symptoms not 
improving more than a month will need EGD and GI referral  
  
Lactic acidosis most likely secondary to seizure, gentle hydration, recheck  
  
Musculoskeletal pain in the chest-most likely from CPR and also seizure  
  
Left ankle sprain rule out fracture  
  
Opiates in urine? Denies taking any pain medications,  
  
Essential hypertension noncompliant with medications: Started back on her 
metoprolol that she was on prior  
  
Asymptomatic cholelithiasis  
Colonic diverticulosis  
Current smoker counseling done  
  
History of pericarditis pericardial effusion,   
Asthma mild intermittent without exacerbation  
  
  
  
Electronically signed by Gregory Montejo MD at 2020 9:16 AM EDT  
  
  
documented in this encounter  
  
Assessments  
  
  
                                                    Diagnosis  
   
                                                      
  
  
Unresponsive episode  
  
  
Other alteration of consciousness  
   
                                                      
  
  
New onset seizure (HCC)  
  
  
Other convulsions  
   
                                                      
  
  
Hypertension  
  
  
Unspecified essential hypertension  
   
                                                      
  
  
Headache  
  
  
  
                                                    Diagnosis  
   
                                                      
  
  
Syncope, unspecified syncope type  
   
                                                      
  
  
Pericarditis, unspecified chronicity, unspecified type  
   
                                                      
  
  
Essential hypertension  
  
  
Unspecified essential hypertension  
   
                                                      
  
  
Tobacco abuse  
  
  
Tobacco use disorder  
   
                                                      
  
  
Depression, unspecified depression type  
   
                                                      
  
  
Atypical migraine  
  
  
Other forms of migraine, without mention of intractable migraine without mention
 of   
status migrainosus  
  
  
  
Advance Directives  
No Advanced Directives Records FoundDocuments on File  
  
                          Type         Date Recorded Patient Representative Expl  
anation  
   
                          Advance Directives and Living Will                      
         
   
                          Power of                              
  
                           Latest Code Status on File  
  
                          Code Status  Date Activated Date Inactivated Comments  
   
                          Full Code    2020 7:00 AM                
  
  
  
                                                                   
   
                          Full Code    2015 3:05 AM 2015 1:05 PM   
  
                           Latest Code Status on File  
  
                          Code Status  Date Activated Date Inactivated Comments  
   
                          Full Code    2020 7:00 AM 2020 5:32 PM   
  
                                Documents on File  
  
                          Type         Date Recorded Patient Representative Expl  
anation  
   
                          Advance Directives and Living Will                      
         
   
                          Power of                              
  
                           Latest Code Status on File  
  
                          Code Status  Date Activated Date Inactivated Comments  
   
                          Full Code    2015 3:05 AM 2015 1:05 PM   
  
                                Documents on File  
  
                          Type         Date Recorded Patient Representative Expl  
anation  
   
                          ACP-Advance Directive                             
   
                          ACP-Power of                              
  
                           Latest Code Status on File  
  
                          Code Status  Date Activated Date Inactivated Comments  
   
                          Full Code    2020 7:00 AM 2020 5:32 PM   
  
  
  
                                                                   
   
                          Full Code    2015 3:05 AM 2015 1:05 PM   
  
  
  
Summary Purpose  
  
  
                                                      
  
  
  
Family History  
No Family History Records FoundNo Family History Records FoundNo Family History 
Records FoundNo Family History Records Found  
  
Additional Source Comments  
  
  
  
                                                    Reason for Visit (unrecogniz  
ed section and content)  
   
                                          
  
  
  
                    Status    Reason    Specialty Diagnoses / Procedures Referre  
d By Contact Referred To   
Contact  
   
                                                                   
  
  
Diagnoses  
  
  
Seizure (HCC)  
  
  
new onset seizure  
  
  
  
                                          
  
  
Reza, Mohsin, MD  
  
  
[address] [place] [address]  
  
  
Phone: [phone]  
  
  
Fax: [phone]                         
  
  
[facility]  
  
  
Phone: [phone]  
  
  
  
                    Status    Reason    Specialty Diagnoses / Procedures Referre  
d By Contact Referred To   
Contact  
   
                                                                   
  
  
Diagnoses  
  
  
Syncope and collapse  
  
  
Disease of pericardium,   
unspecified  
  
  
Essential (primary)   
hypertension  
  
  
Tobacco use  
  
  
Major depressive   
disorder, single   
episode, unspecified  
  
  
Migraine without aura,   
not intractable,   
without status   
migrainosus  
  
  
  
Procedures  
  
  
HC NM SEST. REST STRESS   
MULT                                      
  
                                          
  
  
[facility]  
  
  
Phone: [phone]  
  
  
  
                    Status    Reason    Specialty Diagnoses / Procedures Referre  
d By Contact Referred To   
Contact  
   
                          Open                      Radiology      
  
  
Diagnoses  
  
  
Syncope, unspecified   
syncope type  
  
  
Pericarditis,   
unspecified chronicity,   
unspecified type  
  
  
Essential hypertension  
  
  
Tobacco abuse  
  
  
Depression, unspecified   
depression type  
  
  
Atypical migraine  
  
  
  
Procedures  
  
  
VL DUP CAROTID   
BILATERAL  
  
  
US CAROTID ARTERY   
BILATERAL                                 
  
  
Elsa Franks MD  
  
  
[address] [place] [address] [place]   
[address]  
  
  
Phone: [phone]  
  
  
Fax: [phone]                         
  
  
  
  
  
                                        Reason              Comments  
   
                                        Loss of Consciousness just prior to arri  
lore, witnessed. Unresponsive for 10   
minutes  
   
                                        Emesis              just prior to arriva  
l  
  
  
  
                                        Reason              Comments  
   
                                        Dental Pain         RIght lower, onset 5  
 days ago with swelling  
  
  
  
                                Reason          Onset Date      Comments  
   
                                Med Refill      10/16/2024        
  
  
  
                                        Reason              Comments  
   
                                        Follow-up           Bumps all over, er o  
n Fri  
  
  
  
                                Reason          Onset Date      Comments  
   
                                Med Refill      2024        
  
  
  
                                        Reason              Comments  
   
                                        Med Refill            
  
  
  
                                Reason          Onset Date      Comments  
   
                                Med Refill      09/10/2024        
  
  
  
                                Reason          Onset Date      Comments  
   
                                Med Refill      2025        
  
  
  
                                        Reason              Comments  
   
                                        Follow-up             
   
                                        Gas                 Ongoing for a month  
  
  
  
  
  
                                                    INFORMATION SOURCE (unrecogn  
ized section and content)  
   
                                          
  
  
  
                                        DATE CREATED        AUTHOR  
   
                                2020                      [facility]  
  
  
  
                                DATE CREATED    AUTHOR          AUTHOR'S ORGANIZ  
ATION  
   
                                2021                      [facility]  
pital  
  
  
  
                                DATE CREATED    AUTHOR          AUTHOR'S ORGANIZ  
ATION  
   
                                2023                      [facility]  
pital  
  
  
  
                                DATE CREATED    AUTHOR          AUTHOR'S ORGANIZ  
ATION  
   
                                2025                      [facility]  
[facility]  
  
  
  
  
  
                                                    Ordered Prescriptions (unrec  
ognized section and content)  
   
                                          
  
  
  
                    Prescription Sig       Dispensed Refills   Start Date End Da  
te  
   
                                                      
  
  
penicillin v potassium   
(VEETID) 500 MG tablet                  Take 1 tablet by   
mouth 3 times   
daily for 10 days                         
  
  
30 tablet           0                   2021  
  
  
  
                                    Scheduled  
  
                                                    Active and Recently Administ  
ered Medications (unrecognized section and content)  
   
                                          
  
  
  
                          Medication Order 2021   11/15/2021   2021  
   
                                                      
  
  
butamben-tetracaine-benzocaine   
(CETACAINE) spray 1 spray   
(COMPLETED)  
1 spray, Topical, ONCE, On 21 at , For 1 dose,   
Mix 10 sprays with 15 mL of 2%   
viscous lidocaine Apply small   
amount topically on a cotton   
swab for 30 minutes every 4   
hours as needed for pain  
                                                             (Given - Provid  
er: Delmis Bartlett RN)  
  
   
                                                      
  
  
hydrocodone-acetaminophen   
(NORCO) tablet 5-325 mg (STARTER   
PACK)  
This order is for a take home   
starter pack of medication.   
Please document  Not Given  with   
a reason of  other  on the MAR   
along with a comment of  sent   
home with patient.   
                                                             (Given - Provid  
er: Delmis Bartlett RN)  
  
   
                                                      
  
  
penicillin v potassium (VEETID)   
tablet 500 mg (COMPLETED)  
500 mg, Oral, ONCE, On 21 at , For 1 dose,   
Tube feeding (TF) interaction,   
obtain physician order to   
manage, recommend holding TF for   
1 hr before and 2 hrs after   
dose.  
                                                             (Given - Provid  
er: Delmis Bartlett RN)  
  
  
                                       PRN  
  
                          Medication Order 2021   11/15/2021   2021  
   
                                                      
  
  
lidocaine viscous hcl (XYLOCAINE) 2 % solution 15 mL  
15 mL, Mouth/Throat, EVERY 3 HOURS PRN, Irritation,   
Starting on 21 at 1904, Mixed with 10   
sprays of Cetacaine spray Apply a small amount on a   
cotton swab topically for 30 minutes every 4 hours   
as needed for pain  
                                                              
  
  
  
  
  
                                                    Care Teams (unrecognized sec  
tion and content)  
   
                                          
  
  
  
                      Team Member Relationship Specialty  Start Date End Date  
   
                                                      
  
  
Alistair Martínez MD  
  
  
NPI: [id number]  
  
  
[address] [place] [address] [phone] (Work)  
  
  
[phone] (Fax) PCP - General   Family Medicine 3/15/24           
  
  
  
                      Team Member Relationship Specialty  Start Date End Date  
   
                                                      
  
  
Alistair Martínez MD  
  
  
NPI: [id number]  
  
  
[address] [place] [address] [phone] (Work)  
  
  
[phone] (Fax) PCP - General   Family Medicine 3/15/24           
  
  
  
                      Team Member Relationship Specialty  Start Date End Date  
   
                                                      
  
  
Alistair Martínez MD  
  
  
NPI: [id number]  
  
  
[address] [place] [address] [phone] (Work)  
  
  
[phone] (Fax) PCP - General   Family Medicine 3/15/24           
  
  
  
                      Team Member Relationship Specialty  Start Date End Date  
   
                                                      
  
  
Alistair Martínez MD  
  
  
NPI: [id number]  
  
  
[address] [place] [address]  
  
  
[phone] (Work)  
  
  
[phone] (Fax) PCP - General   Family Medicine 3/15/24           
  
  
  
                      Team Member Relationship Specialty  Start Date End Date  
   
                                                      
  
  
Alistair Martínez MD  
  
  
NPI: [id number]  
  
  
[address] [place] [address]  
  
  
[phone] (Work)  
  
  
[phone] (Fax) PCP - General   Family Medicine 3/15/24           
  
  
  
                      Team Member Relationship Specialty  Start Date End Date  
   
                                                      
  
  
Alistair Martínez MD  
  
  
NPI: [id number]  
  
  
[address] [place] [address]  
  
  
[phone] (Work)  
  
  
[phone] (Fax) PCP - General   Family Medicine 3/15/24           
  
  
  
                      Team Member Relationship Specialty  Start Date End Date  
   
                                                      
  
  
Alistair Martínez MD  
  
  
NPI: [id number]  
  
  
[address] [place] [address]  
  
  
[phone] (Work)  
  
  
[phone] (Fax) PCP - General   Family Medicine 3/15/24           
   
                                                      
  
  
Alistair Martínez MD  
  
  
NPI: [id number]  
  
  
[address] [place] [address]  
  
  
[phone] (Work)  
  
  
[phone] (Fax) PCP - [facility]                 25            
  
  
  
                      Team Member Relationship Specialty  Start Date End Date  
   
                                                      
  
  
Alistair Martínez MD  
  
  
NPI: [id number]  
  
  
[address] [place] [address]  
  
  
[phone] (Work)  
  
  
[phone] (Fax) PCP - General   Family Medicine 3/15/24           
   
                                                      
  
  
Alistair Martínez MD  
  
  
NPI: [id number]  
  
  
[address] [place] [address]  
  
  
[phone] (Work)  
  
  
[phone] (Fax) PCP - [facility]                 25            
  
  
FOR RECORDS PERTAINING TO PATIENTS WHO ARE OR HAVE BEEN ENROLLED IN A CHEMICAL 
DEPENDENCY/SUBSTANCEABUSE PROGRAM, SOME INFORMATION MAY BE OMITTED. This 
clinical summary was aggregated from multiple sources. Caution should be 
exercised in using it in the provision of clinical care. This summary normalizes
 information from multiple sources, and as a consequence, information in this 
document may materially change the coding, format and clinical context of 
patient data. In addition, data may be omitted in some cases. CLINICAL DECISIONS
 SHOULD BE BASED ON THE PRIMARY CLINICAL RECORDS. [facility] [employer] [facility]. provides
 no warranty or guarantee of the accuracy or completeness of information in this
 document.

## 2025-03-14 NOTE — PC.NURSE
i gave this patient verbal and paper discharge orders along with 4 e-scripts and this patient voices yes to understanding these. at time of discharge this patient voices  no concerns and shows no signs of distress
father

## 2025-03-14 NOTE — ED.GENADUL1
HPI
HPI - General Adult
General
Chief complaint: Chest Pain
Stated complaint: DIFF BREATHING
Time Seen by Provider: 03/14/25 18:30
Source: patient
Mode of arrival: walk-in
Limitations: no limitations
History of Present Illness
HPI narrative: 
Patient is a 69-year-old female who presents to the emergency department for 7 to 10 day history of flulike illness.  She reports chest discomfort, shortness of breath, cough, headache, dizziness.  She has had no hemoptysis, leg swelling.  She 
reports multiple family members with a similar illness at home.  She states she had hot and cold chills at the beginning of the course of her illness but has not had persistent fevers.  She has not had vomiting or diarrhea.  Her significant other is 
apparently also being evaluated for the same.  No medications taken prior to arrival.  She states her symptoms seemed worse today which prompted her to come to the ER.
Related Data
Home Medications

?Medication ?Instructions ?Recorded ?Confirmed
albuterol 90 mcg/actuation aerosol 90 mcg inhalation QID PRN 10/26/24 10/26/24
inhaler shortness of breath or wheezing  
hydroxyzine HCl 25 mg tablet 25 mg PO Q6H PRN anxiety 10/26/24 10/26/24
lisinopril 20 1 tab PO DAILY 10/26/24 10/26/24
mg-hydrochlorothiazide 25 mg tablet   
tramadol 50 mg tablet 50 mg PO DAILY pain 10/26/24 10/26/24

Previous Rx's

?Medication ?Instructions ?Recorded
albuterol sulfate 90 mcg/actuation 2 inh inhalation Q4H PRN shortness 03/14/25
aerosol inhaler of breath or wheezing #8.5 grams 
levofloxacin 750 mg tablet 750 mg PO DAILY 7 days #7 tabs 03/14/25
methylprednisolone 4 mg tablets in See Rx Instructions .Route 03/14/25
a dose pack (Medrol (Emigdio)) .COMPLEX #21 ea 
ondansetron 4 mg disintegrating 4 mg PO Q6H PRN nausea and 03/14/25
tablet vomiting #12 tabs 


Allergies

Allergy/AdvReac Type Severity Reaction Status Date / Time
shellfish derived Allergy  Hives Verified 10/26/24 05:27



Opioid HPI
Opioid Management
Most Recent Opioid Data: 
      Last Pain Scale 7 03/14/25 18:55 03/14/25
      Last MAR Pain Assessment 03/14/25 18:55  


Review of Systems
ROS  
 Constitutional Reports: fever and chills   
 Ears, nose, mouth, and throat Reports: nasal congestion; Denies: throat pain   
 Cardiovascular Reports: chest pain   
 Respiratory Reports: shortness of breath and cough   
 Gastrointestinal Reports: nausea; Denies: abdominal pain, vomiting or diarrhea   
 Musculoskeletal Denies: back pain   
 Integumentary/Breast Denies: rash   
 Neurological Reports: headache and dizziness   
 Hematologic/Lymphatic Denies: easy bruising or easy bleeding   

[facility]
Social History (Reviewed 03/14/25 @ 18:40 by NOEMI Gonzales)
Little interest or pleasure in doing things:  not at all 
Feeling down, depressed, or hopeless:  not at all 



Exam
Narrative
Exam Narrative: 
Gen.: Awake, alert, in no distress
Head: Normocephalic, atraumatic
ENT: Moist mucous membranes
Respiratory: No respiratory distress, lungs clear bilaterally
Cardio: Regular rate and rhythm
Gastrointestinal: Abdomen is soft, nondistended and nontender to palpation
Extremities: Moves extremities equally
Psych: Normal mood and affect
Neuro: No focal neuro deficit
Skin: Warm, dry, intact

Constitutional
Vital Signs, click to edit/add: 

Last Vital Signs

Temp  98.0 F   03/14/25 18:26
Pulse  75   03/14/25 19:50
Resp  20   03/14/25 19:50
BP  119/70   03/14/25 19:30
Pulse Ox  99   03/14/25 19:50
O2 Del Method  Room Air  03/14/25 19:00




Course
Vital Signs
Vital signs: 

Vital Signs

Temperature  98.0 F   03/14/25 18:26
Pulse Rate  86   03/14/25 18:26
Respiratory Rate  18   03/14/25 18:26
Blood Pressure  127/74   03/14/25 18:26
Pulse Oximetry  97   03/14/25 18:26
Oxygen Delivery Method  Room Air  03/14/25 18:26



Temperature  98.0 F   03/14/25 18:26
Pulse Rate  75   03/14/25 19:50
Respiratory Rate  20   03/14/25 19:50
Blood Pressure  119/70   03/14/25 19:30
Pulse Oximetry  99   03/14/25 19:50
Oxygen Delivery Method  Room Air  03/14/25 19:00




Medical Decision Making
MDM Narrative
Medical decision making narrative: 
Patient medicated with IV fluids, albuterol breathing treatment and Solu-Medrol.  Laboratory studies reviewed and noted within normal limits, respiratory swabs are negative and chest x-ray shows no evidence of acute cardiopulmonary changes.  Patient 
will be treated based on the duration of her upper respiratory illness with Levaquin, Medrol Dosepak, Zofran, albuterol inhaler for home.  Follow-up with PCP.  In no distress on reevaluation by attending physician.

SHARED APC VISIT, PHYSICIAN ATTESTATION: Face-to-face

I performed a substantive part of the MDM during the patient?s E/M visit. I personally evaluated and examined the patient. I personally made or approved the documented management plan and acknowledge its risk of complications.

Medical Records
Medical records reviewed: Yes I reviewed the patient's medical records
Lab Data
Lab results reviewed: Yes I reviewed the patient's lab results
Labs: 

Lab Results

  03/14/25 03/14/25 Range/Units
  18:25 18:30 
WBC  3.8 L   (4.0-11.0)  10^3/uL
RBC  4.01 L   (4.20-5.40)  10^6/uL
Hgb  12.9   (12.0-16.0)  g/dL
Hct  35.5 L   (36.0-48.0)  %
MCV  88.5   (81.0-99.0)  fL
MCH  32.2   (26.7-34.0)  pg
MCHC  36.3 H   (29.9-35.2)  g/dL
RDW  14.4   (11.0-15.0)  %
Plt Count  217   (150-450)  10^3/uL
MPV  11.3   (9.5-13.5)  fL
Neut % (Auto)  63.6   (43.0-75.0)  %
Lymph % (Auto)  27.6   (20.5-60.0)  %
Mono % (Auto)  6.6   (1.7-12.0)  %
Eos % (Auto)  1.6   (0.9-7.0)  %
Baso % (Auto)  0.3   (0.2-2.0)  %
Neut # (Auto)  2.4   (1.4-6.5)  10^3/uL
Lymph # (Auto)  1.1 L   (1.2-3.8)  10^3/uL
Mono # (Auto)  0.3   (0.3-0.8)  10^3/uL
Eos # (Auto)  0.1   (0.0-0.7)  10^3/uL
Baso # (Auto)  0.0   (0.0-0.1)  10^3/uL
Abs Immat Gran (auto)  0.01   (0.00-0.03)  10^3/uL
Imm/Tot Granulo (auto)  0.3   (0.0-0.5)  %
PT  10.9   (9.0-11.6)  sec
INR  1.03   
VBG pH  7.343   (7.330-7.430)  
VBG pCO2  46.2   (40.0-52.0)  mmHg
Sodium  140   (136-145)  mmol/L
Potassium  4.6   (3.5-5.1)  mmol/L
Chloride  101   ()  mmol/L
Carbon Dioxide  25.4   (21.0-32.0)  mmol/L
Anion Gap  18.2   
BUN  31.0 H   (7.0-18.0)  mg/dL
Creatinine  1.74 H   (0.55-1.02)  mg/dL
Est GFR ( Amer)  35 L   (>=60 mL/min/1.73m^2)  
Est GFR (Non-Af Amer)  29 L   (>=60 mL/min/1.73m^2)  
BUN/Creatinine Ratio  17.8   
Glucose  103   ()  mg/dL
Lactate  1.3   (0.4-2.0)  mmol/L
Calcium  8.9   (8.5-10.1)  mg/dL
Magnesium  2.1   (1.8-2.4)  mg/dL
Total Bilirubin  0.5   (0.2-1.0)  mg/dL
AST  33   (15-37)  U/L
ALT  25   (14-59)  U/L
Alkaline Phosphatase  126 H   ()  U/L
Troponin I High Sens  7.8   (4.0-51.3)  pg/mL
NT-Pro-B Natriuret Pep  172.0   (<=900.0)  pg/mL
Total Protein  7.8   (6.4-8.2)  g/dL
Albumin  3.9   (3.4-5.0)  g/dL
Globulin  3.9   g/dL
Albumin/Globulin Ratio  1.0   
TSH  1.380   (0.358-3.740)  uIU/mL
Influenza Type A Ag   Negative  
Influenza Type B Ag   Negative  
SARS-CoV-2 Ag (CV2AG)   Negative  (NEGATIVE)  



Imaging Data
Chest x-ray: 
      Attestation: I have reviewed the pertinent imaging results.
ECG Data
Attestation: I personally reviewed and interpreted this ECG as follows: (Sinus rhythm at a rate of 65, no acute ST elevation or ectopy.  EKG reviewed by attending physician)

Discharge Plan
Discharge
Chief Complaint: Chest Pain

Clinical Impression:
 Upper respiratory infection


Patient Disposition: Home, Self-Care

Time of Disposition Decision: 19:57

Condition: Good

Prescriptions / Home Meds:
New
  levofloxacin 750 mg tablet 
   750 mg PO DAILY 7 Days Qty: 7 0RF
  methylprednisolone [Medrol (Emigdio)] 4 mg tablets,dose pack 
   See Rx Instructions  .ROUTE .COMPLEX Qty: 21 0RF
   Rx Instructions:
   Taper as directed
  albuterol sulfate 90 mcg/actuation HFA aerosol inhaler 
   2 inh inhalation Q4H PRN (Reason: shortness of breath or wheezing) Qty: 8.5 0RF
  ondansetron 4 mg tablet,disintegrating 
   4 mg PO Q6H PRN (Reason: nausea and vomiting) Qty: 12 0RF

No Action
  hydroxyzine HCl 25 mg tablet 
   25 mg PO Q6H PRN (Reason: anxiety) 
  lisinopril-hydrochlorothiazide 20-25 mg tablet 
   1 tab PO DAILY 
  tramadol 50 mg tablet 
   50 mg PO DAILY 
  albuterol 90 mcg/actuation aerosol 
   90 mcg inhalation QID PRN (Reason: shortness of breath or wheezing) 

Print Language: English

Instructions:  Upper Respiratory Infection (ED)

Referrals:
Alistair Krause MD [Primary Care Provider] - 1 week

## 2025-03-14 NOTE — ED_ITS
HPI    
HPI - General Adult    
General    
Chief complaint: Chest Pain    
Stated complaint: DIFF BREATHING    
Time Seen by Provider: 03/14/25 18:30    
Source: patient    
Mode of arrival: walk-in    
Limitations: no limitations    
History of Present Illness    
HPI narrative:     
Patient is a 69-year-old female who presents to the emergency department for 7   
to 10 day history of flulike illness.  She reports chest discomfort, shortness   
of breath, cough, headache, dizziness.  She has had no hemoptysis, leg swelling.  
 She reports multiple family members with a similar illness at home.  She states  
she had hot and cold chills at the beginning of the course of her illness but   
has not had persistent fevers.  She has not had vomiting or diarrhea.  Her   
significant other is apparently also being evaluated for the same.  No medic  
ations taken prior to arrival.  She states her symptoms seemed worse today which  
prompted her to come to the ER.    
Related Data    
                                Home Medications    
    
    
    
?Medication ?Instructions ?Recorded ?Confirmed    
     
albuterol 90 mcg/actuation aerosol 90 mcg inhalation QID PRN 10/26/24 10/26/24    
    
inhaler shortness of breath or wheezing      
     
hydroxyzine HCl 25 mg tablet 25 mg PO Q6H PRN anxiety 10/26/24 10/26/24    
     
lisinopril 20 1 tab PO DAILY 10/26/24 10/26/24    
    
mg-hydrochlorothiazide 25 mg tablet       
     
tramadol 50 mg tablet 50 mg PO DAILY pain 10/26/24 10/26/24    
    
    
                                  Previous Rx's    
    
    
    
?Medication ?Instructions ?Recorded    
     
albuterol sulfate 90 mcg/actuation 2 inh inhalation Q4H PRN shortness 03/14/25    
    
aerosol inhaler of breath or wheezing #8.5 grams     
     
levofloxacin 750 mg tablet 750 mg PO DAILY 7 days #7 tabs 03/14/25    
     
methylprednisolone 4 mg tablets in See Rx Instructions .Route 03/14/25    
    
a dose pack (Medrol (Emigdio)) .COMPLEX #21 ea     
     
ondansetron 4 mg disintegrating 4 mg PO Q6H PRN nausea and 03/14/25    
    
tablet vomiting #12 tabs     
    
    
    
                                    Allergies    
    
    
    
Allergy/AdvReac Type Severity Reaction Status Date / Time    
     
shellfish derived Allergy  Hives Verified 10/26/24 05:27    
    
    
    
    
Opioid HPI    
Opioid Management    
Most Recent Opioid Data:     
    
    
                Last Pain Scale 7               03/14/25 18:55  03/14/25    
     
                          Last MAR Pain Assessment  03/14/25 18:55    
    
    
    
Review of Systems    
    
    
ROS      
    
 Constitutional Reports: fever and chills       
    
 Ears, nose, mouth, and throat Reports: nasal congestion; Denies: throat pain       
    
 Cardiovascular Reports: chest pain       
    
 Respiratory Reports: shortness of breath and cough       
    
 Gastrointestinal Reports: nausea; Denies: abdominal pain, vomiting or diarrhea   
     
    
 Musculoskeletal Denies: back pain       
    
 Integumentary/Breast Denies: rash       
    
 Neurological Reports: headache and dizziness       
    
 Hematologic/Lymphatic Denies: easy bruising or easy bleeding       
    
    
[facility]    
Social History (Reviewed 03/14/25 @ 18:40 by NOEMI Gonzales)    
Little interest or pleasure in doing things:  not at all     
Feeling down, depressed, or hopeless:  not at all     
    
    
    
Exam    
Narrative    
Exam Narrative:     
Gen.: Awake, alert, in no distress    
Head: Normocephalic, atraumatic    
ENT: Moist mucous membranes    
Respiratory: No respiratory distress, lungs clear bilaterally    
Cardio: Regular rate and rhythm    
Gastrointestinal: Abdomen is soft, nondistended and nontender to palpation    
Extremities: Moves extremities equally    
Psych: Normal mood and affect    
Neuro: No focal neuro deficit    
Skin: Warm, dry, intact    
    
Constitutional    
Vital Signs, click to edit/add:     
    
                                Last Vital Signs    
    
    
    
Temp  98.0 F   03/14/25 18:26    
     
Pulse  75   03/14/25 19:50    
     
Resp  20   03/14/25 19:50    
     
BP  119/70   03/14/25 19:30    
     
Pulse Ox  99   03/14/25 19:50    
     
O2 Del Method  Room Air  03/14/25 19:00    
    
    
    
    
    
Course    
Vital Signs    
Vital signs:     
    
                                   Vital Signs    
    
    
    
Temperature  98.0 F   03/14/25 18:26    
     
Pulse Rate  86   03/14/25 18:26    
     
Respiratory Rate  18   03/14/25 18:26    
     
Blood Pressure  127/74   03/14/25 18:26    
     
Pulse Oximetry  97   03/14/25 18:26    
     
Oxygen Delivery Method  Room Air  03/14/25 18:26    
    
    
                                            
    
    
    
Temperature  98.0 F   03/14/25 18:26    
     
Pulse Rate  75   03/14/25 19:50    
     
Respiratory Rate  20   03/14/25 19:50    
     
Blood Pressure  119/70   03/14/25 19:30    
     
Pulse Oximetry  99   03/14/25 19:50    
     
Oxygen Delivery Method  Room Air  03/14/25 19:00    
    
    
    
    
    
Medical Decision Making    
MDM Narrative    
Medical decision making narrative:     
Patient medicated with IV fluids, albuterol breathing treatment and Solu-Medrol.  
 Laboratory studies reviewed and noted within normal limits, respiratory swabs   
are negative and chest x-ray shows no evidence of acute cardiopulmonary changes.  
 Patient will be treated based on the duration of her upper respiratory illness   
with Levaquin, Medrol Dosepak, Zofran, albuterol inhaler for home.  Follow-up   
with PCP.  In no distress on reevaluation by attending physician.    
    
SHARED APC VISIT, PHYSICIAN ATTESTATION: Face-to-face    
    
I performed a substantive part of the MDM during the patient?s E/M visit. I   
personally evaluated and examined the patient. I personally made or approved the  
documented management plan and acknowledge its risk of complications.    
    
Medical Records    
Medical records reviewed: Yes I reviewed the patient's medical records    
Lab Data    
Lab results reviewed: Yes I reviewed the patient's lab results    
Labs:     
    
                                   Lab Results    
    
    
    
  03/14/25 03/14/25 Range/Units    
    
  18:25 18:30     
     
WBC  3.8 L   (4.0-11.0)  10^3/uL    
     
RBC  4.01 L   (4.20-5.40)  10^6/uL    
     
Hgb  12.9   (12.0-16.0)  g/dL    
     
Hct  35.5 L   (36.0-48.0)  %    
     
MCV  88.5   (81.0-99.0)  fL    
     
MCH  32.2   (26.7-34.0)  pg    
     
MCHC  36.3 H   (29.9-35.2)  g/dL    
     
RDW  14.4   (11.0-15.0)  %    
     
Plt Count  217   (150-450)  10^3/uL    
     
MPV  11.3   (9.5-13.5)  fL    
     
Neut % (Auto)  63.6   (43.0-75.0)  %    
     
Lymph % (Auto)  27.6   (20.5-60.0)  %    
     
Mono % (Auto)  6.6   (1.7-12.0)  %    
     
Eos % (Auto)  1.6   (0.9-7.0)  %    
     
Baso % (Auto)  0.3   (0.2-2.0)  %    
     
Neut # (Auto)  2.4   (1.4-6.5)  10^3/uL    
     
Lymph # (Auto)  1.1 L   (1.2-3.8)  10^3/uL    
     
Mono # (Auto)  0.3   (0.3-0.8)  10^3/uL    
     
Eos # (Auto)  0.1   (0.0-0.7)  10^3/uL    
     
Baso # (Auto)  0.0   (0.0-0.1)  10^3/uL    
     
Abs Immat Gran (auto)  0.01   (0.00-0.03)  10^3/uL    
     
Imm/Tot Granulo (auto)  0.3   (0.0-0.5)  %    
     
PT  10.9   (9.0-11.6)  sec    
     
INR  1.03       
     
VBG pH  7.343   (7.330-7.430)      
     
VBG pCO2  46.2   (40.0-52.0)  mmHg    
     
Sodium  140   (136-145)  mmol/L    
     
Potassium  4.6   (3.5-5.1)  mmol/L    
     
Chloride  101   ()  mmol/L    
     
Carbon Dioxide  25.4   (21.0-32.0)  mmol/L    
     
Anion Gap  18.2       
     
BUN  31.0 H   (7.0-18.0)  mg/dL    
     
Creatinine  1.74 H   (0.55-1.02)  mg/dL    
     
Est GFR ( Amer)  35 L   (>=60 mL/min/1.73m^2)      
     
Est GFR (Non-Af Amer)  29 L   (>=60 mL/min/1.73m^2)      
     
BUN/Creatinine Ratio  17.8       
     
Glucose  103   ()  mg/dL    
     
Lactate  1.3   (0.4-2.0)  mmol/L    
     
Calcium  8.9   (8.5-10.1)  mg/dL    
     
Magnesium  2.1   (1.8-2.4)  mg/dL    
     
Total Bilirubin  0.5   (0.2-1.0)  mg/dL    
     
AST  33   (15-37)  U/L    
     
ALT  25   (14-59)  U/L    
     
Alkaline Phosphatase  126 H   ()  U/L    
     
Troponin I High Sens  7.8   (4.0-51.3)  pg/mL    
     
NT-Pro-B Natriuret Pep  172.0   (<=900.0)  pg/mL    
     
Total Protein  7.8   (6.4-8.2)  g/dL    
     
Albumin  3.9   (3.4-5.0)  g/dL    
     
Globulin  3.9   g/dL    
     
Albumin/Globulin Ratio  1.0       
     
TSH  1.380   (0.358-3.740)  uIU/mL    
     
Influenza Type A Ag   Negative      
     
Influenza Type B Ag   Negative      
     
SARS-CoV-2 Ag (CV2AG)   Negative  (NEGATIVE)      
    
    
    
    
Imaging Data    
Chest x-ray:     
      Attestation: I have reviewed the pertinent imaging results.    
ECG Data    
Attestation: I personally reviewed and interpreted this ECG as follows: (Sinus   
rhythm at a rate of 65, no acute ST elevation or ectopy.  EKG reviewed by   
attending physician)    
    
Discharge Plan    
Discharge    
Chief Complaint: Chest Pain    
    
Clinical Impression:    
 Upper respiratory infection    
    
    
Patient Disposition: Home, Self-Care    
    
Time of Disposition Decision: 19:57    
    
Condition: Good    
    
Prescriptions / Home Meds:    
New    
  levofloxacin 750 mg tablet     
   750 mg PO DAILY 7 Days Qty: 7 0RF    
  methylprednisolone [Medrol (Emigdio)] 4 mg tablets,dose pack     
   See Rx Instructions  .ROUTE .COMPLEX Qty: 21 0RF    
   Rx Instructions:    
   Taper as directed    
  albuterol sulfate 90 mcg/actuation HFA aerosol inhaler     
   2 inh inhalation Q4H PRN (Reason: shortness of breath or wheezing) Qty: 8.5   
0RF    
  ondansetron 4 mg tablet,disintegrating     
   4 mg PO Q6H PRN (Reason: nausea and vomiting) Qty: 12 0RF    
    
No Action    
  hydroxyzine HCl 25 mg tablet     
   25 mg PO Q6H PRN (Reason: anxiety)     
  lisinopril-hydrochlorothiazide 20-25 mg tablet     
   1 tab PO DAILY     
  tramadol 50 mg tablet     
   50 mg PO DAILY     
  albuterol 90 mcg/actuation aerosol     
   90 mcg inhalation QID PRN (Reason: shortness of breath or wheezing)     
    
Print Language: English    
    
Instructions:  Upper Respiratory Infection (ED)    
    
Referrals:    
Alistair Krause MD [Primary Care Provider] - 1 week